# Patient Record
Sex: FEMALE | Race: WHITE | NOT HISPANIC OR LATINO | Employment: OTHER | URBAN - METROPOLITAN AREA
[De-identification: names, ages, dates, MRNs, and addresses within clinical notes are randomized per-mention and may not be internally consistent; named-entity substitution may affect disease eponyms.]

---

## 2017-01-16 DIAGNOSIS — F33.1 MAJOR DEPRESSIVE DISORDER, RECURRENT EPISODE, MODERATE (H): Primary | ICD-10-CM

## 2017-01-17 NOTE — TELEPHONE ENCOUNTER
escitalopram (LEXAPRO) 20 MG tablet     Last Written Prescription Date: 1/12/16  Last Fill Quantity: 135, # refills: 3  Last Office Visit with G primary care provider:  4/19/16        Last PHQ-9 score on record=   PHQ-9 SCORE 6/22/2016   Total Score -   Total Score 4

## 2017-01-18 RX ORDER — ESCITALOPRAM OXALATE 20 MG/1
30 TABLET ORAL DAILY
Qty: 135 TABLET | Refills: 0 | Status: SHIPPED | OUTPATIENT
Start: 2017-01-18 | End: 2017-05-14

## 2017-01-18 NOTE — TELEPHONE ENCOUNTER
Routing refill request to provider for review/approval because:  PHQ-9 is greater than 4  Route to provider to review refill and depression/anxiety plan.  Also due for phq9  Casa Barroso RN

## 2017-03-03 ENCOUNTER — TELEPHONE (OUTPATIENT)
Dept: FAMILY MEDICINE | Facility: OTHER | Age: 67
End: 2017-03-03

## 2017-03-03 DIAGNOSIS — E78.5 HYPERLIPIDEMIA: ICD-10-CM

## 2017-03-03 RX ORDER — SIMVASTATIN 5 MG
TABLET ORAL
Qty: 30 TABLET | Refills: 0 | Status: SHIPPED | OUTPATIENT
Start: 2017-03-03 | End: 2017-04-07

## 2017-03-03 NOTE — LETTER
[facility]  [address]  [place] [address]  [phone]        March 7, 2017    Tiesha Gurrola  [address] [address]              Dear Tiesha Gurrola    This is to remind you that your fasting lab work is due is due.    You may call our office at [phone] to schedule an appointment.    Please disregard this notice if you have already had your labs drawn or made an appointment.        Sincerely,        Jessy David MD/pk

## 2017-03-03 NOTE — TELEPHONE ENCOUNTER
simvastatin (ZOCOR) 5 MG tablet     Last Written Prescription Date: 01/12/16  Last Fill Quantity: 90, # refills: 3  Last Office Visit with G, P or [facility] prescribing provider: 06/22/16       Lab Results   Component Value Date    CHOL 171 01/19/2016     Lab Results   Component Value Date    HDL 50 01/19/2016     Lab Results   Component Value Date    LDL 90 01/19/2016     Lab Results   Component Value Date    TRIG 153 01/19/2016     Lab Results   Component Value Date    CHOLHDLRATIO 5.8 08/20/2015

## 2017-03-03 NOTE — TELEPHONE ENCOUNTER
Medication is being filled for 1 time refill only due to:  Patient needs labs Fasting Lipids.  Please contact patient for lab only appt.   Zully Vargas RN

## 2017-03-10 ENCOUNTER — OFFICE VISIT (OUTPATIENT)
Dept: FAMILY MEDICINE | Facility: CLINIC | Age: 67
End: 2017-03-10
Payer: COMMERCIAL

## 2017-03-10 ENCOUNTER — TELEPHONE (OUTPATIENT)
Dept: FAMILY MEDICINE | Facility: OTHER | Age: 67
End: 2017-03-10

## 2017-03-10 VITALS
SYSTOLIC BLOOD PRESSURE: 128 MMHG | DIASTOLIC BLOOD PRESSURE: 88 MMHG | RESPIRATION RATE: 10 BRPM | HEIGHT: 64 IN | WEIGHT: 213 LBS | BODY MASS INDEX: 36.37 KG/M2 | HEART RATE: 74 BPM | TEMPERATURE: 98.2 F

## 2017-03-10 DIAGNOSIS — Z86.39 HISTORY OF THYROID DISEASE: ICD-10-CM

## 2017-03-10 DIAGNOSIS — L29.9 GENERALIZED PRURITUS: Primary | ICD-10-CM

## 2017-03-10 LAB
ERYTHROCYTE [DISTWIDTH] IN BLOOD BY AUTOMATED COUNT: 12.8 % (ref 10–15)
HCT VFR BLD AUTO: 40.2 % (ref 35–47)
HGB BLD-MCNC: 14 G/DL (ref 11.7–15.7)
MCH RBC QN AUTO: 31.7 PG (ref 26.5–33)
MCHC RBC AUTO-ENTMCNC: 34.8 G/DL (ref 31.5–36.5)
MCV RBC AUTO: 91 FL (ref 78–100)
PLATELET # BLD AUTO: 221 10E9/L (ref 150–450)
RBC # BLD AUTO: 4.41 10E12/L (ref 3.8–5.2)
WBC # BLD AUTO: 7.6 10E9/L (ref 4–11)

## 2017-03-10 PROCEDURE — 85027 COMPLETE CBC AUTOMATED: CPT | Performed by: FAMILY MEDICINE

## 2017-03-10 PROCEDURE — 84443 ASSAY THYROID STIM HORMONE: CPT | Performed by: FAMILY MEDICINE

## 2017-03-10 PROCEDURE — 36415 COLL VENOUS BLD VENIPUNCTURE: CPT | Performed by: FAMILY MEDICINE

## 2017-03-10 PROCEDURE — 80053 COMPREHEN METABOLIC PANEL: CPT | Performed by: FAMILY MEDICINE

## 2017-03-10 PROCEDURE — 99214 OFFICE O/P EST MOD 30 MIN: CPT | Performed by: FAMILY MEDICINE

## 2017-03-10 RX ORDER — PREDNISONE 20 MG/1
TABLET ORAL
Qty: 20 TABLET | Refills: 0 | Status: SHIPPED | OUTPATIENT
Start: 2017-03-10 | End: 2017-03-16

## 2017-03-10 ASSESSMENT — PAIN SCALES - GENERAL: PAINLEVEL: NO PAIN (0)

## 2017-03-10 NOTE — TELEPHONE ENCOUNTER
Reason for call:  Same Day Appointment  Reason for Call:  Same Day Appointment, Requested Provider:  any     PCP: Jessy David    Reason for visit: hands and feet itchy, blisters on palm of hand from itching    Duration of symptoms: off and on for a over a year    Have you been treated for this in the past? No    Additional comments: is wondering if she could be worked in today in [facility] or [place]    Can we leave a detailed message on this number? YES    Phone number patient can be reached at: [phone]    Best Time: any    Call taken on 3/10/2017 at 9:44 AM by Janette Stern

## 2017-03-10 NOTE — PROGRESS NOTES
"  SUBJECTIVE:                                                    Tiesha Gurrola is a 66 year old female who presents to clinic today for the following health issues:      Rash     Onset: Been going on for 2 years, but gets flair ups    Description:   Location: Both hand and both feet  Character: burning, red, Blisters from itching  Itching (Pruritis): YES    Progression of Symptoms:  worsening    Accompanying Signs & Symptoms:  Fever: no   Body aches or joint pain: no   Sore throat symptoms: no   Recent cold symptoms: YES   History:   Previous similar rash: YES    Precipitating factors:   Exposure to similar rash: no   New exposures: None   Recent travel: no     Alleviating factors:  N/A     Therapies Tried and outcome: Has had this for the past 2 years. Usually has flare ups in the summer. Itchy on both hands and feet and now has blisters from itching. Wondering if its not her thyroid.     Benadryl, cortisone, lotion, triamcinolone  No new exposures.   No other family members with this.   She reports that when the itching begins her skin will look normal.   Then as she begins she will notice \"red bumps\" and an occasional blister or two.       HISTORY OF THYROID- she reports that she was on thyroid medication when she was young. She would like this to be rechecked. She has noted thinning of her hair. She wonders if this itching is related to her thyroid.         Problem list and histories reviewed & adjusted, as indicated.  Additional history: as documented        Reviewed and updated as needed this visit by clinical staff  Tobacco  Allergies  Med Hx  Surg Hx  Fam Hx  Soc Hx      Reviewed and updated as needed this visit by Provider         ROS:  C: NEGATIVE for fever, chills, change in weight  INTEGUMENTARY/SKIN: as above.   E/M: NEGATIVE for ear, mouth and throat problems  R: NEGATIVE for significant cough or SOB  CV: NEGATIVE for chest pain, palpitations or peripheral edema  M: NEGATIVE for significant " "arthralgias or myalgia  N: NEGATIVE for weakness, dizziness or paresthesias    OBJECTIVE:                                                    /88  Pulse 74  Temp 98.2  F (36.8  C) (Temporal)  Resp 10  Ht 5' 3.78\" (1.62 m)  Wt 213 lb (96.6 kg)  LMP 05/01/2009  BMI 36.81 kg/m2  Body mass index is 36.81 kg/(m^2).  GENERAL APPEARANCE: patient is alert and oriented. She is constantly scratching her hands and arms and rubbing her feet on the floor and together.   HENT: throat is clear and Mucous membranes are moist.   NECK: no adenopathy, no asymmetry, masses, or scars and thyroid normal to palpation  RESP: lungs clear to auscultation - no rales, rhonchi or wheezes  CV: regular rates and rhythm, normal S1 S2, no S3 or S4 and no murmur, click or rub  SKIN: patient with multiple excoriations noted on both hands and feet.  She has nothing on the trunk or face. She has 3 small blisters in the palm of her left hand. No burrows noted in the web spaces of fingers and toes.          ASSESSMENT/PLAN:                                                        1. Generalized pruritus  Patient with generalized pruritis.   Consider eczema however, she reports normal skin when the itching begins.    Consider scabies but no burrows found. No other family members in the home have this either.   Consider pruritis secondary to systemic disease and will check labs as noted.   Consider neurodermatitis.   No clear exposure to allergen known  Will notify of results.   Trial of prednisone to see if this improves the itching  Will follow up with patient next week with results and will check in on symptoms.   All questions invited, asked and answered to the patient's apparent satisfaction.  Patient agrees to plan.    - predniSONE (DELTASONE) 20 MG tablet; Take 3 tabs (60 mg) by mouth daily x 3 days, 2 tabs (40 mg) daily x 3 days, 1 tab (20 mg) daily x 3 days, then 1/2 tab (10 mg) x 3 days.  Dispense: 20 tablet; Refill: 0  - CBC with platelets  - " Comprehensive metabolic panel (BMP + Alb, Alk Phos, ALT, AST, Total. Bili, TP)    2. History of thyroid disease  Patient with history of thyroid issues   Will check lab and notify of results.   - TSH with free T4 reflex        YULIANA WINSTON MD, MD  [facility]

## 2017-03-10 NOTE — NURSING NOTE
"Chief Complaint   Patient presents with     Blister     itching, blister in hand and feet      Panel Management     honoring choice, hepatitis c, tobacco cessation, lipid        Initial /90  Pulse 74  Temp 98.2  F (36.8  C) (Temporal)  Resp 10  Ht 5' 3.78\" (1.62 m)  Wt 213 lb (96.6 kg)  LMP 05/01/2009  BMI 36.81 kg/m2 Estimated body mass index is 36.81 kg/(m^2) as calculated from the following:    Height as of this encounter: 5' 3.78\" (1.62 m).    Weight as of this encounter: 213 lb (96.6 kg).  Medication Reconciliation: complete     Fanta Valentin CMA  "

## 2017-03-10 NOTE — TELEPHONE ENCOUNTER
"Tiesha Gurrola is a 66 year old female    S-(situation): Tiesha is calling today with intense itching and peeling skin    B-(background): states she's had itching on the chest and arms for a couple years - maybe a spot here or there on ankles    A-(assessment): Pt reports an onset last night of extreme itching on palms and backs of hands and feet and ankles today. It goes penitentiary up her arms.   She has notices some blisters and peeling skin on her hands from itching so much.    Scratching does not relieve the itch. Reports it's almost like a burning sensation.  She states, \"It's driving me crazy.\"     Has had recent cold sxs.    Pain scale (1-10): No pain, just severe itching   Denies: Pain, fever, breathing difficulty, sore throat   Meds/MeasuresTried: Triamcinolone, cortisone    Improves/Worsens: Nothing is helping.       R-(recommendations): See same day  Will comply with recommendation: yes - appt made to see Dr. Lara today    If further questions/concerns or if Sxs do not improve, worsen or new Sxs develop, call your PCP or [facility] as soon as possible.    Guideline used:  Telephone Triage Protocols for Nurses, Fourth Edition, Cris Viera  Rash  Itching    Daysi Lozada, FRANCES, BSN    "

## 2017-03-10 NOTE — TELEPHONE ENCOUNTER
Pt is calling back because she wants to get in today. Pt has some irritation on her hands and ankles. She keeps itching it and they are turning into blisters. Please read message below.

## 2017-03-10 NOTE — MR AVS SNAPSHOT
"              After Visit Summary   3/10/2017    Tiesha Gurrola    MRN: [id number]           Patient Information     Date Of Birth          1950        Visit Information        Provider Department      3/10/2017 3:00 PM Maribel Lara MD [facility]        Today's Diagnoses     Generalized pruritus    -  1    History of thyroid disease           Follow-ups after your visit        Who to contact     If you have questions or need follow up information about today's clinic visit or your schedule please contact [facility] directly at [phone].  Normal or non-critical lab and imaging results will be communicated to you by [employer], letter or phone within 4 business days after the clinic has received the results. If you do not hear from us within 7 days, please contact the clinic through [employer] or phone. If you have a critical or abnormal lab result, we will notify you by phone as soon as possible.  Submit refill requests through [employer] or call your pharmacy and they will forward the refill request to us. Please allow 3 business days for your refill to be completed.          Additional Information About Your Visit        MyChart Information     [employer] lets you send messages to your doctor, view your test results, renew your prescriptions, schedule appointments and more. To sign up, go to [email or web address] . Click on \"Log in\" on the left side of the screen, which will take you to the Welcome page. Then click on \"Sign up Now\" on the right side of the page.     You will be asked to enter the access code listed below, as well as some personal information. Please follow the directions to create your username and password.     Your access code is: [email or web address]  Expires: 2017  3:57 PM     Your access code will  in 90 days. If you need help or a new code, please call your [facility] or [phone].        Care EveryWhere ID     This is your Care EveryWhere ID. This " "could be used by other organizations to access your [place] medical records  [id number]        Your Vitals Were     Pulse Temperature Respirations Height Last Period BMI (Body Mass Index)    74 98.2  F (36.8  C) (Temporal) 10 5' 3.78\" (1.62 m) 05/01/2009 36.81 kg/m2       Blood Pressure from Last 3 Encounters:   03/10/17 130/90   11/30/16 120/72   10/20/16 118/68    Weight from Last 3 Encounters:   03/10/17 213 lb (96.6 kg)   11/30/16 212 lb (96.2 kg)   10/20/16 213 lb (96.6 kg)              We Performed the Following     CBC with platelets     Comprehensive metabolic panel (BMP + Alb, Alk Phos, ALT, AST, Total. Bili, TP)     TSH with free T4 reflex          Today's Medication Changes          These changes are accurate as of: 3/10/17  3:57 PM.  If you have any questions, ask your nurse or doctor.               Start taking these medicines.        Dose/Directions    predniSONE 20 MG tablet   Commonly known as:  DELTASONE   Used for:  Generalized pruritus   Started by:  Maribel Lara MD        Take 3 tabs (60 mg) by mouth daily x 3 days, 2 tabs (40 mg) daily x 3 days, 1 tab (20 mg) daily x 3 days, then 1/2 tab (10 mg) x 3 days.   Quantity:  20 tablet   Refills:  0            Where to get your medicines      These medications were sent to [id number] - [place], [place] - [address] [address]  [address] [address]     Phone:  [phone]     predniSONE 20 MG tablet                Primary Care Provider Office Phone # Fax #    Jessy David [phone]       [facility] [address]  [place] [address]        Thank you!     Thank you for choosing [facility]  for your care. Our goal is always to provide you with excellent care. Hearing back from our patients is one way we can continue to improve our services. Please take a few minutes to complete the written survey that you may receive in the mail after your visit with us. Thank you!   "           Your Updated Medication List - Protect others around you: Learn how to safely use, store and throw away your medicines at www.disposemymeds.org.          This list is accurate as of: 3/10/17  3:57 PM.  Always use your most recent med list.                   Brand Name Dispense Instructions for use    ALPRAZolam 0.25 MG tablet    XANAX    60 tablet    Take 1 tablet (0.25 mg) by mouth nightly as needed for anxiety       ASPIRIN PO      Take 325 mg by mouth as needed for moderate pain Reported on 3/10/2017       escitalopram 20 MG tablet    LEXAPRO    135 tablet    Take 1.5 tablets (30 mg) by mouth daily Due for appointment       flecainide acetate 150 MG Tabs     180 tablet    Take 1 tablet (150 mg) by mouth every 12 hours       predniSONE 20 MG tablet    DELTASONE    20 tablet    Take 3 tabs (60 mg) by mouth daily x 3 days, 2 tabs (40 mg) daily x 3 days, 1 tab (20 mg) daily x 3 days, then 1/2 tab (10 mg) x 3 days.       simvastatin 5 MG tablet    ZOCOR    30 tablet    TAKE ONE TABLET BY MOUTH ONCE DAILY       triamcinolone 0.1 % cream    KENALOG    30 g    Apply sparingly to affected area three times daily for 14 days.

## 2017-03-13 LAB
ALBUMIN SERPL-MCNC: 3.8 G/DL (ref 3.4–5)
ALP SERPL-CCNC: 76 U/L (ref 40–150)
ALT SERPL W P-5'-P-CCNC: 22 U/L (ref 0–50)
ANION GAP SERPL CALCULATED.3IONS-SCNC: 9 MMOL/L (ref 3–14)
AST SERPL W P-5'-P-CCNC: 14 U/L (ref 0–45)
BILIRUB SERPL-MCNC: 0.3 MG/DL (ref 0.2–1.3)
BUN SERPL-MCNC: 13 MG/DL (ref 7–30)
CALCIUM SERPL-MCNC: 8.7 MG/DL (ref 8.5–10.1)
CHLORIDE SERPL-SCNC: 108 MMOL/L (ref 94–109)
CO2 SERPL-SCNC: 25 MMOL/L (ref 20–32)
CREAT SERPL-MCNC: 0.82 MG/DL (ref 0.52–1.04)
GFR SERPL CREATININE-BSD FRML MDRD: 71 ML/MIN/1.7M2
GLUCOSE SERPL-MCNC: 94 MG/DL (ref 70–99)
POTASSIUM SERPL-SCNC: 3.9 MMOL/L (ref 3.4–5.3)
PROT SERPL-MCNC: 7.1 G/DL (ref 6.8–8.8)
SODIUM SERPL-SCNC: 142 MMOL/L (ref 133–144)
TSH SERPL DL<=0.005 MIU/L-ACNC: 2.28 MU/L (ref 0.4–4)

## 2017-03-15 NOTE — PROGRESS NOTES
SUBJECTIVE:                                                    Tiesha Gurrola is a 66 year old female who presents to clinic today for the following health issues:      HPI    Rash     Onset: x1 week    Description:   Location: Entire Body (Hand/Feet Worse)  Character: No Visible Rash/ Just itching  Itching (Pruritis): YES    Progression of Symptoms: Some has improved, but itching other places now    Accompanying Signs & Symptoms:  Fever: no   Body aches or joint pain: no   Sore throat symptoms: no   Recent cold symptoms: no    History:   Previous similar rash: YES    Precipitating factors:   Exposure to similar rash: no   New exposures: None   Recent travel: no     Alleviating factors:       Therapies Tried and outcome: Steroid Cream, Prednisone, Vaseline      Problem list and histories reviewed & adjusted, as indicated.  Additional history: as documented      Patient Active Problem List   Diagnosis     Mitral valve disorder     Tobacco use disorder     A-fib (H)     Anxiety     Major depressive disorder, recurrent episode, moderate (H)     Advanced directives, counseling/discussion     Health Care Home     SVT (supraventricular tachycardia) (H)     Hyperlipidemia     Postmenopausal bleeding     Paroxysmal atrial fibrillation (H)     Past Surgical History   Procedure Laterality Date     C treat ectopic preg,abd preg  1974     about 3 mos.     Colonoscopy  07/11/07     Hc laparoscopy, surgical; appendectomy  08/27/2007     C lap,uterus,unlisted procedure  08/27/2007     Lyis of adhesions of the uterus to the abdominal wall.     Tympanomastoidectomy with facial monitoring  12/13/2011     Procedure:TYMPANOMASTOIDECTOMY WITH FACIAL MONITORING; right tympanoplasty, mastoidectomy with facial nerve monitoring; Surgeon:EVI LLAMAS; Location:[facility] OR     Joint replacement, hip rt/lt Right 8/12/14     Joint Replacement Hip RT/LT     Shoulder surgery Left 1/7/15     torn bicep, arthroplasty, rotator cuff     Dilation  and curettage  2/18/16     HD&C     Dilation and curettage, operative hysteroscopy, combined N/A 2/18/2016     Procedure: COMBINED DILATION AND CURETTAGE, OPERATIVE HYSTEROSCOPY;  Surgeon: Keshia Chang DO;  Location: [facility] OR     Myringotomy Right 4/26/2016     Procedure: MYRINGOTOMY;  Surgeon: Jake Solorzano MD;  Location: [facility] OR     Tympanomastoidectomy Right 6/23/2016     Procedure: TYMPANOMASTOIDECTOMY;  Surgeon: Rishabh Boudreaux MD;  Location: [facility] OR     Meatoplasty ear Right 6/23/2016     Procedure: MEATOPLASTY EAR;  Surgeon: Rishabh Boudreaux MD;  Location: [facility] OR     Canalplasty Right 6/23/2016     Procedure: CANALPLASTY;  Surgeon: Rishabh Boudreaux MD;  Location: [facility] OR     Graft thiersch status post tympanomastoidectomy Right 6/30/2016     Procedure: GRAFT THIERSCH STATUS POST TYMPANOMASTOIDECTOMY;  Surgeon: Rishabh Boudreaux MD;  Location: [facility] OR     H ablation focal afib  10/12/16       Social History   Substance Use Topics     Smoking status: Current Every Day Smoker     Packs/day: 0.25     Years: 32.00     Types: Cigarettes     Smokeless tobacco: Never Used      Comment: 7 cigs a day     Alcohol use No     Family History   Problem Relation Age of Onset     Allergies Son      Hayfever     HEART DISEASE Son      Paroxysmal tach.     Arthritis Mother      Depression Mother      depression and anxiety     Respiratory Mother      Asthma     Arthritis Father      HEART DISEASE Father      Lipids Father      Arrhythmia Father      Pacemaker Father      Heart Surgery Father      bypass x3     CANCER Maternal Grandmother      Breast CA     CANCER Paternal Grandmother      ?? pancreatic CA     OSTEOPOROSIS Paternal Grandmother      Neurologic Disorder Daughter      ?? epilepsy     Obesity Daughter      Family History Negative Brother      Family History Negative Son      Family History Negative Brother      DIABETES Other      Maternal cousin --- [email or web address]     EYE* Other      Niece-- lazy eye      HEART DISEASE Paternal Uncle      death at 56/bypass surgery     HEART DISEASE Paternal Aunt          Current Outpatient Prescriptions   Medication Sig Dispense Refill     triamcinolone (KENALOG) 0.1 % cream Mix entire tube of Kenalog (triamincinolone cream) with 16 oz. Of Cera-Ve or Cetaphil lotion, KEEP REFRIGERATED 80 g 0     simvastatin (ZOCOR) 5 MG tablet TAKE ONE TABLET BY MOUTH ONCE DAILY 30 tablet 0     flecainide acetate 150 MG TABS Take 1 tablet (150 mg) by mouth every 12 hours 180 tablet 3     escitalopram (LEXAPRO) 20 MG tablet Take 1.5 tablets (30 mg) by mouth daily Due for appointment 135 tablet 0     ASPIRIN PO Take 325 mg by mouth as needed for moderate pain Reported on 3/16/2017       Recent Labs   Lab Test  03/10/17   1600  10/12/16   0700  01/19/16   0915  08/20/15   1001   12/22/14   1546   01/15/14   1134   LDL   --    --   90  131*   --    --    --   95   HDL   --    --   50  36*   --    --    --   39*   TRIG   --    --   153*  213*   --    --    --   140   ALT  22   --   22   --    --   20   --   28   CR  0.82  0.69  0.82   --    < >  0.75   < >  0.75   GFRESTIMATED  71  85  71   --    < >  78   < >  79   GFRESTBLACK  85  >90   GFR Calc    86   --    < >  >90   GFR Calc     < >  >90   POTASSIUM  3.9  4.0  4.4   --    < >  3.7   < >  4.3   TSH  2.28   --    --    --    --    --    --   2.59    < > = values in this interval not displayed.      BP Readings from Last 3 Encounters:   03/16/17 120/70   03/10/17 128/88   11/30/16 120/72    Wt Readings from Last 3 Encounters:   03/16/17 214 lb (97.1 kg)   03/10/17 213 lb (96.6 kg)   11/30/16 212 lb (96.2 kg)                  Labs reviewed in EPIC    ROS:  Constitutional, HEENT, cardiovascular, pulmonary, gi and gu systems are negative, except as otherwise noted.    OBJECTIVE:                                                    /70 (BP Location: Right arm, Patient Position: Chair, Cuff Size: Adult Large)  Pulse 64   "Temp 98.8  F (37.1  C) (Oral)  Resp 18  Ht 5' 3.78\" (1.62 m)  Wt 214 lb (97.1 kg)  LMP 05/01/2009  SpO2 100%  BMI 36.99 kg/m2  Body mass index is 36.99 kg/(m^2).  Physical Exam   Constitutional: She appears well-developed and well-nourished.   HENT:   Head: Normocephalic and atraumatic.   Skin:   Excoriations noted on b/l extremities and torso likely from scratching   Psychiatric: She has a normal mood and affect.         Diagnostic Test Results:  none      ASSESSMENT/PLAN:                                                      Problem List Items Addressed This Visit     Tobacco use disorder    Relevant Orders    TOBACCO CESSATION ORDER FOR [facility] (Completed)      Other Visit Diagnoses     Pruritus    -  Primary    Relevant Medications    triamcinolone (KENALOG) 0.1 % cream    Need for hepatitis C screening test        Needs smoking cessation education        Dermatitis        Relevant Medications    triamcinolone (KENALOG) 0.1 % cream       No clear etiology - likely non specific dermaitis  Trial therapy with triamcenolone   Discussed home care  Reportable signs and symptoms discussed  RTC if symptoms persist or fail to improve    Radha Balbuena MD  [facility]  "

## 2017-03-16 ENCOUNTER — OFFICE VISIT (OUTPATIENT)
Dept: FAMILY MEDICINE | Facility: OTHER | Age: 67
End: 2017-03-16
Payer: COMMERCIAL

## 2017-03-16 ENCOUNTER — TELEPHONE (OUTPATIENT)
Dept: FAMILY MEDICINE | Facility: OTHER | Age: 67
End: 2017-03-16

## 2017-03-16 VITALS
HEART RATE: 64 BPM | DIASTOLIC BLOOD PRESSURE: 70 MMHG | SYSTOLIC BLOOD PRESSURE: 120 MMHG | TEMPERATURE: 98.8 F | HEIGHT: 64 IN | RESPIRATION RATE: 18 BRPM | OXYGEN SATURATION: 100 % | BODY MASS INDEX: 36.54 KG/M2 | WEIGHT: 214 LBS

## 2017-03-16 DIAGNOSIS — L30.9 DERMATITIS: ICD-10-CM

## 2017-03-16 DIAGNOSIS — L29.9 PRURITUS: Primary | ICD-10-CM

## 2017-03-16 DIAGNOSIS — Z11.59 NEED FOR HEPATITIS C SCREENING TEST: ICD-10-CM

## 2017-03-16 DIAGNOSIS — F17.200 NEEDS SMOKING CESSATION EDUCATION: ICD-10-CM

## 2017-03-16 DIAGNOSIS — F17.200 TOBACCO USE DISORDER: ICD-10-CM

## 2017-03-16 PROCEDURE — 99213 OFFICE O/P EST LOW 20 MIN: CPT | Performed by: FAMILY MEDICINE

## 2017-03-16 RX ORDER — TRIAMCINOLONE ACETONIDE 1 MG/G
CREAM TOPICAL
Qty: 80 G | Refills: 0 | Status: SHIPPED | OUTPATIENT
Start: 2017-03-16 | End: 2018-08-10

## 2017-03-16 ASSESSMENT — PAIN SCALES - GENERAL: PAINLEVEL: NO PAIN (0)

## 2017-03-16 NOTE — TELEPHONE ENCOUNTER
[facility] needs more information for the patient's prescription for triamcinolone (KENALOG) 0.1 % cream, they want to know if are to compound it and also they need to know how often it needs to be applied?    Thank you Loly

## 2017-03-16 NOTE — NURSING NOTE
"Chief Complaint   Patient presents with     RECHECK     instense itching (meds didn't work)     Panel Management     Quit plan, honoring choices, hep c, lipids, PHQ9, GAGE, MyChart        Initial /70 (BP Location: Right arm, Patient Position: Chair, Cuff Size: Adult Large)  Pulse 64  Temp 98.8  F (37.1  C) (Oral)  Resp 18  Ht 5' 3.78\" (1.62 m)  Wt 214 lb (97.1 kg)  LMP 05/01/2009  SpO2 100%  BMI 36.99 kg/m2 Estimated body mass index is 36.99 kg/(m^2) as calculated from the following:    Height as of this encounter: 5' 3.78\" (1.62 m).    Weight as of this encounter: 214 lb (97.1 kg).  Medication Reconciliation: complete   Maribel Bob, BRUCE      "

## 2017-03-16 NOTE — TELEPHONE ENCOUNTER
If they can do it at  A cost that pt is agreeable to , they can do it, if not patient can do it at home. Needs to be applied twice a day

## 2017-03-16 NOTE — MR AVS SNAPSHOT
"              After Visit Summary   3/16/2017    Tiesha Gurrola    MRN: [id number]           Patient Information     Date Of Birth          1950        Visit Information        Provider Department      3/16/2017 11:00 AM Radha Balbuena MD [facility]        Today's Diagnoses     Pruritus    -  1    Need for hepatitis C screening test        Tobacco use disorder        Needs smoking cessation education        Dermatitis          Care Instructions    CERAVE or Cetaphil        Follow-ups after your visit        Who to contact     If you have questions or need follow up information about today's clinic visit or your schedule please contact [facility] directly at [phone].  Normal or non-critical lab and imaging results will be communicated to you by MyChart, letter or phone within 4 business days after the clinic has received the results. If you do not hear from us within 7 days, please contact the clinic through MyChart or phone. If you have a critical or abnormal lab result, we will notify you by phone as soon as possible.  Submit refill requests through [employer] or call your pharmacy and they will forward the refill request to us. Please allow 3 business days for your refill to be completed.          Additional Information About Your Visit        MyChart Information     [employer] lets you send messages to your doctor, view your test results, renew your prescriptions, schedule appointments and more. To sign up, go to [email or web address] . Click on \"Log in\" on the left side of the screen, which will take you to the Welcome page. Then click on \"Sign up Now\" on the right side of the page.     You will be asked to enter the access code listed below, as well as some personal information. Please follow the directions to create your username and password.     Your access code is: [email or web address]  Expires: 2017  4:57 PM     Your access code will  in 90 days. If you need " "help or a new code, please call your [facility]) or [phone].        Care EveryWhere ID     This is your Care EveryWhere ID. This could be used by other organizations to access your [place] medical records  [id number]        Your Vitals Were     Pulse Temperature Respirations Height Last Period Pulse Oximetry    64 98.8  F (37.1  C) (Oral) 18 5' 3.78\" (1.62 m) 05/01/2009 100%    BMI (Body Mass Index)                   36.99 kg/m2            Blood Pressure from Last 3 Encounters:   03/16/17 120/70   03/10/17 128/88   11/30/16 120/72    Weight from Last 3 Encounters:   03/16/17 214 lb (97.1 kg)   03/10/17 213 lb (96.6 kg)   11/30/16 212 lb (96.2 kg)              We Performed the Following     TOBACCO CESSATION ORDER FOR HM          Today's Medication Changes          These changes are accurate as of: 3/16/17 11:39 AM.  If you have any questions, ask your nurse or doctor.               These medicines have changed or have updated prescriptions.        Dose/Directions    triamcinolone 0.1 % cream   Commonly known as:  KENALOG   This may have changed:  additional instructions   Used for:  Pruritus, Dermatitis   Changed by:  Radha Balbuena MD        Mix entire tube of Kenalog (triamincinolone cream) with 16 oz. Of Cera-Ve or Cetaphil lotion, KEEP REFRIGERATED   Quantity:  80 g   Refills:  0         Stop taking these medicines if you haven't already. Please contact your care team if you have questions.     predniSONE 20 MG tablet   Commonly known as:  DELTASONE   Stopped by:  Radha Balbuena MD                Where to get your medicines      These medications were sent to [id number] - [place], [place] - [address] [address]  [address] [address] [address]     Phone:  [phone]     triamcinolone 0.1 % cream                Primary Care Provider Office Phone # Fax #    Jessy David [phone]       [facility] [address]  [facility] [address]      "   Thank you!     Thank you for choosing [facility]  for your care. Our goal is always to provide you with excellent care. Hearing back from our patients is one way we can continue to improve our services. Please take a few minutes to complete the written survey that you may receive in the mail after your visit with us. Thank you!             Your Updated Medication List - Protect others around you: Learn how to safely use, store and throw away your medicines at www.disposemymeds.org.          This list is accurate as of: 3/16/17 11:39 AM.  Always use your most recent med list.                   Brand Name Dispense Instructions for use    ASPIRIN PO      Take 325 mg by mouth as needed for moderate pain Reported on 3/16/2017       escitalopram 20 MG tablet    LEXAPRO    135 tablet    Take 1.5 tablets (30 mg) by mouth daily Due for appointment       flecainide acetate 150 MG Tabs     180 tablet    Take 1 tablet (150 mg) by mouth every 12 hours       simvastatin 5 MG tablet    ZOCOR    30 tablet    TAKE ONE TABLET BY MOUTH ONCE DAILY       triamcinolone 0.1 % cream    KENALOG    80 g    Mix entire tube of Kenalog (triamincinolone cream) with 16 oz. Of Cera-Ve or Cetaphil lotion, KEEP REFRIGERATED

## 2017-03-17 ASSESSMENT — ANXIETY QUESTIONNAIRES
7. FEELING AFRAID AS IF SOMETHING AWFUL MIGHT HAPPEN: NOT AT ALL
2. NOT BEING ABLE TO STOP OR CONTROL WORRYING: NOT AT ALL
6. BECOMING EASILY ANNOYED OR IRRITABLE: NOT AT ALL
5. BEING SO RESTLESS THAT IT IS HARD TO SIT STILL: MORE THAN HALF THE DAYS
1. FEELING NERVOUS, ANXIOUS, OR ON EDGE: SEVERAL DAYS
3. WORRYING TOO MUCH ABOUT DIFFERENT THINGS: SEVERAL DAYS
GAD7 TOTAL SCORE: 6
IF YOU CHECKED OFF ANY PROBLEMS ON THIS QUESTIONNAIRE, HOW DIFFICULT HAVE THESE PROBLEMS MADE IT FOR YOU TO DO YOUR WORK, TAKE CARE OF THINGS AT HOME, OR GET ALONG WITH OTHER PEOPLE: SOMEWHAT DIFFICULT

## 2017-03-17 ASSESSMENT — PATIENT HEALTH QUESTIONNAIRE - PHQ9: 5. POOR APPETITE OR OVEREATING: MORE THAN HALF THE DAYS

## 2017-03-18 ASSESSMENT — ANXIETY QUESTIONNAIRES: GAD7 TOTAL SCORE: 6

## 2017-03-18 ASSESSMENT — PATIENT HEALTH QUESTIONNAIRE - PHQ9: SUM OF ALL RESPONSES TO PHQ QUESTIONS 1-9: 6

## 2017-03-23 NOTE — PROGRESS NOTES
"  SUBJECTIVE:                                                    Tiesha Gurrola is a 66 year old female who presents to clinic today for the following health issues:    HPI    Rash     Onset: 2-2  week    Description:   Location: Entire Body (Hand/Feet Worse)  Character: No Visible Rash/ Just itching  Itching (Pruritis): YES    Progression of Symptoms: Some has improved, but itching other places now    Accompanying Signs & Symptoms:  Fever: no   Body aches or joint pain: no   Sore throat symptoms: no   Recent cold symptoms: no    History:   Previous similar rash: YES    Precipitating factors:   Exposure to similar rash: no   New exposures: None   Recent travel: no     Alleviating factors:     Therapies Tried and outcome: Steroid Cream, Prednisone, Vaseline    Patient has been seen on 03/10/17 and 03/16/17. Patient states the medication prescribed is not helping. Steroids and steroid cream has not helped. Patient has not worsened, but feels it has stayed the same since last visit. States she has \"itched so much it caused blisters.\"      Problem list and histories reviewed & adjusted, as indicated.  Additional history: as documented      Patient Active Problem List   Diagnosis     Mitral valve disorder     Tobacco use disorder     A-fib (H)     Anxiety     Major depressive disorder, recurrent episode, moderate (H)     Advanced directives, counseling/discussion     Health Care Home     SVT (supraventricular tachycardia) (H)     Hyperlipidemia     Postmenopausal bleeding     Paroxysmal atrial fibrillation (H)     Past Surgical History:   Procedure Laterality Date     C LAP,UTERUS,UNLISTED PROCEDURE  08/27/2007    Lyis of adhesions of the uterus to the abdominal wall.     C TREAT ECTOPIC PREG,ABD PREG  1974    about 3 mos.     CANALPLASTY Right 6/23/2016    Procedure: CANALPLASTY;  Surgeon: Rishabh Boudreaux MD;  Location: [facility] OR     COLONOSCOPY  07/11/07     DILATION AND CURETTAGE  2/18/16    HD&C     DILATION AND " CURETTAGE, OPERATIVE HYSTEROSCOPY, COMBINED N/A 2/18/2016    Procedure: COMBINED DILATION AND CURETTAGE, OPERATIVE HYSTEROSCOPY;  Surgeon: Keshia Chang DO;  Location: [facility] OR     GRAFT THIERSCH STATUS POST TYMPANOMASTOIDECTOMY Right 6/30/2016    Procedure: GRAFT THIERSCH STATUS POST TYMPANOMASTOIDECTOMY;  Surgeon: Rishabh Boudreaux MD;  Location: [facility] OR     H ABLATION FOCAL AFIB  10/12/16     HC LAPAROSCOPY, SURGICAL; APPENDECTOMY  08/27/2007     JOINT REPLACEMENT, HIP RT/LT Right 8/12/14    Joint Replacement Hip RT/LT     MEATOPLASTY EAR Right 6/23/2016    Procedure: MEATOPLASTY EAR;  Surgeon: Rishabh Boudreaux MD;  Location: [facility] OR     MYRINGOTOMY Right 4/26/2016    Procedure: MYRINGOTOMY;  Surgeon: Jake Solorzano MD;  Location: [facility] OR     SHOULDER SURGERY Left 1/7/15    torn bicep, arthroplasty, rotator cuff     TYMPANOMASTOIDECTOMY Right 6/23/2016    Procedure: TYMPANOMASTOIDECTOMY;  Surgeon: Rishabh Boudreaux MD;  [facility] OR     TYMPANOMASTOIDECTOMY WITH FACIAL MONITORING  12/13/2011    Procedure:TYMPANOMASTOIDECTOMY WITH FACIAL MONITORING; right tympanoplasty, mastoidectomy with facial nerve monitoring; Surgeon:JAKE SOLORZANO; Location:[facility] OR       Social History   Substance Use Topics     Smoking status: Current Every Day Smoker     Packs/day: 0.25     Years: 32.00     Types: Cigarettes     Smokeless tobacco: Never Used      Comment: 7 cigs a day     Alcohol use No     Family History   Problem Relation Age of Onset     Allergies Son      Hayfever     HEART DISEASE Son      Paroxysmal tach.     Arthritis Mother      Depression Mother      depression and anxiety     Respiratory Mother      Asthma     Arthritis Father      HEART DISEASE Father      Lipids Father      Arrhythmia Father      Pacemaker Father      Heart Surgery Father      bypass x3     CANCER Maternal Grandmother      Breast CA     CANCER Paternal Grandmother      ?? pancreatic CA     OSTEOPOROSIS Paternal Grandmother       Neurologic Disorder Daughter      ?? epilepsy     Obesity Daughter      Family History Negative Brother      Family History Negative Son      Family History Negative Brother      DIABETES Other      Maternal cousin --- [email or web address]     EYE* Other      Niece-- lazy eye     HEART DISEASE Paternal Uncle      death at 56/bypass surgery     HEART DISEASE Paternal Aunt          Current Outpatient Prescriptions   Medication Sig Dispense Refill     triamcinolone (KENALOG) 0.1 % cream Mix entire tube of Kenalog (triamincinolone cream) with 16 oz. Of Cera-Ve or Cetaphil lotion, KEEP REFRIGERATED 80 g 0     simvastatin (ZOCOR) 5 MG tablet TAKE ONE TABLET BY MOUTH ONCE DAILY 30 tablet 0     escitalopram (LEXAPRO) 20 MG tablet Take 1.5 tablets (30 mg) by mouth daily Due for appointment 135 tablet 0     ASPIRIN PO Take 325 mg by mouth as needed for moderate pain Reported on 3/16/2017       flecainide acetate 150 MG TABS Take 1 tablet (150 mg) by mouth every 12 hours 180 tablet 3     Allergies   Allergen Reactions     No Known Drug Allergies      Oxycodone Nausea and Vomiting     BP Readings from Last 3 Encounters:   03/27/17 126/84   03/16/17 120/70   03/10/17 128/88    Wt Readings from Last 3 Encounters:   03/27/17 215 lb (97.5 kg)   03/16/17 214 lb (97.1 kg)   03/10/17 213 lb (96.6 kg)                  Labs reviewed in EPIC    ROS:  Constitutional, HEENT, cardiovascular, pulmonary, gi and gu systems are negative, except as otherwise noted.    OBJECTIVE:                                                    /84 (BP Location: Right arm, Patient Position: Chair, Cuff Size: Adult Regular)  Pulse 71  Temp 97.5  F (36.4  C) (Temporal)  Resp 16  Wt 215 lb (97.5 kg)  LMP 05/01/2009  SpO2 96%  BMI 37.16 kg/m2  Body mass index is 37.16 kg/(m^2).  Physical Exam   Constitutional: She appears well-developed and well-nourished.   Cardiovascular: Normal rate and regular rhythm.    Pulmonary/Chest: Effort normal and breath sounds  normal.   Skin:   Excoriations of the skin - no rash noted   Psychiatric: She has a normal mood and affect.         Diagnostic Test Results:  none      ASSESSMENT/PLAN:                                                      Problem List Items Addressed This Visit     None      Visit Diagnoses     Generalized pruritus    -  Primary    Relevant Orders    ALLERGY/ASTHMA ADULT REFERRAL    Erythrocyte sedimentation rate auto    CRP, inflammation    Complement C3    Complement C4    Antinuclear antibody screen by EIA    Antistreptolysin O    Uric acid    Need for hepatitis C screening test           Pt started to itch all over the her body with out any rash since earlier this month . The intial labs to r/o systemic causes were all negative including normal thyroid, liver and kidney function and complete blood picture . She has tried oral steroids and then was put on topical steroids with mild improvement but continues to have this intense itch all over her body but mainly in her upper and lower extremities . She has a petechial looking rash on her right shin today but she states that it is so because she has been scratching her skin which makes it look that way. Will get inflammatory markers and autoimmune markers to r/o other etiology . Also will consider allergy referral if results are inconclusive. Pt is agreeable to the above plan  Continue topical  triamcenolone for now.      Radha Balbuena MD  [facility]

## 2017-03-27 ENCOUNTER — OFFICE VISIT (OUTPATIENT)
Dept: FAMILY MEDICINE | Facility: OTHER | Age: 67
End: 2017-03-27
Payer: COMMERCIAL

## 2017-03-27 VITALS
WEIGHT: 215 LBS | SYSTOLIC BLOOD PRESSURE: 126 MMHG | TEMPERATURE: 97.5 F | OXYGEN SATURATION: 96 % | RESPIRATION RATE: 16 BRPM | BODY MASS INDEX: 37.16 KG/M2 | DIASTOLIC BLOOD PRESSURE: 84 MMHG | HEART RATE: 71 BPM

## 2017-03-27 DIAGNOSIS — L29.9 GENERALIZED PRURITUS: Primary | ICD-10-CM

## 2017-03-27 LAB
CRP SERPL-MCNC: 10 MG/L (ref 0–8)
ERYTHROCYTE [SEDIMENTATION RATE] IN BLOOD BY WESTERGREN METHOD: 10 MM/H (ref 0–30)
URATE SERPL-MCNC: 4.8 MG/DL (ref 2.6–6)

## 2017-03-27 PROCEDURE — 86160 COMPLEMENT ANTIGEN: CPT | Performed by: FAMILY MEDICINE

## 2017-03-27 PROCEDURE — 86140 C-REACTIVE PROTEIN: CPT | Performed by: FAMILY MEDICINE

## 2017-03-27 PROCEDURE — 86038 ANTINUCLEAR ANTIBODIES: CPT | Performed by: FAMILY MEDICINE

## 2017-03-27 PROCEDURE — 85652 RBC SED RATE AUTOMATED: CPT | Performed by: FAMILY MEDICINE

## 2017-03-27 PROCEDURE — 84550 ASSAY OF BLOOD/URIC ACID: CPT | Performed by: FAMILY MEDICINE

## 2017-03-27 PROCEDURE — 86060 ANTISTREPTOLYSIN O TITER: CPT | Performed by: FAMILY MEDICINE

## 2017-03-27 PROCEDURE — 36415 COLL VENOUS BLD VENIPUNCTURE: CPT | Performed by: FAMILY MEDICINE

## 2017-03-27 PROCEDURE — 99214 OFFICE O/P EST MOD 30 MIN: CPT | Performed by: FAMILY MEDICINE

## 2017-03-27 ASSESSMENT — PAIN SCALES - GENERAL: PAINLEVEL: NO PAIN (0)

## 2017-03-27 NOTE — MR AVS SNAPSHOT
After Visit Summary   3/27/2017    Tiesha Gurrola    MRN: [id number]           Patient Information     Date Of Birth          1950        Visit Information        Provider Department      3/27/2017 12:40 PM Radha Balbuena MD [facility]        Today's Diagnoses     Generalized pruritus    -  1       Follow-ups after your visit        Additional Services     ALLERGY/ASTHMA ADULT REFERRAL       Your provider has referred you to: [facility]: [facility] [phone] [email or web address]    Please be aware that coverage of these services is subject to the terms and limitations of your health insurance plan.  Call member services at your health plan with any benefit or coverage questions.      Please bring the following with you to your appointment:    (1) Any X-Rays, CTs or MRIs which have been performed.  Contact the facility where they were done to arrange for  prior to your scheduled appointment.    (2) List of current medications  (3) This referral request   (4) Any documents/labs given to you for this referral                  Your next 10 appointments already scheduled     Mar 27, 2017  1:45 PM CDT   LAB with [facility]   [facility] ([facility]    [address] [facility] [id number] [phone]           Patient must bring picture ID.  Patient should be prepared to give a urine specimen  Please do not eat 10-12 hours before your appointment if you are coming in fasting for labs on lipids, cholesterol, or glucose (sugar).  Pregnant women should follow their Care Team instructions. Water with medications is okay. Do not drink coffee or other fluids.   If you have concerns about taking  your medications, please ask at office or if scheduling via [employer], send a message by clicking on Secure Messaging, Message Your Care Team.              Who to contact     If you have questions or need  "follow up information about today's clinic visit or your schedule please contact [facility] [place] directly at [phone].  Normal or non-critical lab and imaging results will be communicated to you by MyChart, letter or phone within 4 business days after the clinic has received the results. If you do not hear from us within 7 days, please contact the clinic through [employer] or phone. If you have a critical or abnormal lab result, we will notify you by phone as soon as possible.  Submit refill requests through [employer] or call your pharmacy and they will forward the refill request to us. Please allow 3 business days for your refill to be completed.          Additional Information About Your Visit        [employer] Information     [employer] lets you send messages to your doctor, view your test results, renew your prescriptions, schedule appointments and more. To sign up, go to [email or web address] . Click on \"Log in\" on the left side of the screen, which will take you to the Welcome page. Then click on \"Sign up Now\" on the right side of the page.     You will be asked to enter the access code listed below, as well as some personal information. Please follow the directions to create your username and password.     Your access code is: [email or web address]  Expires: 2017  4:57 PM     Your access code will  in 90 days. If you need help or a new code, please call your [place] clinic or [phone].        Care EveryWhere ID     This is your Care EveryWhere ID. This could be used by other organizations to access your [place] medical records  [id number]        Your Vitals Were     Pulse Temperature Respirations Last Period Pulse Oximetry BMI (Body Mass Index)    71 97.5  F (36.4  C) (Temporal) 16 2009 96% 37.16 kg/m2       Blood Pressure from Last 3 Encounters:   17 126/84   17 120/70   03/10/17 128/88    Weight from Last 3 Encounters:   17 215 lb (97.5 kg)   17 214 lb (97.1 kg) "   03/10/17 213 lb (96.6 kg)              We Performed the Following     ALLERGY/ASTHMA ADULT REFERRAL     Antinuclear antibody screen by EIA     Antistreptolysin O     Complement C3     Complement C4     CRP, inflammation     Erythrocyte sedimentation rate auto     Uric acid        Primary Care Provider Office Phone # Fax #    Jessy F MD Joann [phone]       [facility] [address]  [facility] [address]        Thank you!     Thank you for choosing [facility]  for your care. Our goal is always to provide you with excellent care. Hearing back from our patients is one way we can continue to improve our services. Please take a few minutes to complete the written survey that you may receive in the mail after your visit with us. Thank you!             Your Updated Medication List - Protect others around you: Learn how to safely use, store and throw away your medicines at www.disposemymeds.org.          This list is accurate as of: 3/27/17  1:07 PM.  Always use your most recent med list.                   Brand Name Dispense Instructions for use    ASPIRIN PO      Take 325 mg by mouth as needed for moderate pain Reported on 3/16/2017       escitalopram 20 MG tablet    LEXAPRO    135 tablet    Take 1.5 tablets (30 mg) by mouth daily Due for appointment       flecainide acetate 150 MG Tabs     180 tablet    Take 1 tablet (150 mg) by mouth every 12 hours       simvastatin 5 MG tablet    ZOCOR    30 tablet    TAKE ONE TABLET BY MOUTH ONCE DAILY       triamcinolone 0.1 % cream    KENALOG    80 g    Mix entire tube of Kenalog (triamincinolone cream) with 16 oz. Of Cera-Ve or Cetaphil lotion, KEEP REFRIGERATED

## 2017-03-27 NOTE — NURSING NOTE
"Chief Complaint   Patient presents with     Derm Problem     medication not helping     Panel Management     honoring choices, hep c, phq9/magali       Initial /84 (BP Location: Right arm, Patient Position: Chair, Cuff Size: Adult Regular)  Pulse 71  Temp 97.5  F (36.4  C) (Temporal)  Resp 16  Wt 215 lb (97.5 kg)  LMP 05/01/2009  SpO2 96%  BMI 37.16 kg/m2 Estimated body mass index is 37.16 kg/(m^2) as calculated from the following:    Height as of 3/16/17: 5' 3.78\" (1.62 m).    Weight as of this encounter: 215 lb (97.5 kg).  Medication Reconciliation: complete     Sheryl Chacon, BRUCE      "

## 2017-03-29 LAB
ANA SER QL IA: NORMAL
ASO AB SERPL-ACNC: 71 IU/ML (ref 0–120)
C3 SERPL-MCNC: 137 MG/DL (ref 76–169)
C4 SERPL-MCNC: 39 MG/DL (ref 15–50)

## 2017-03-30 ENCOUNTER — TELEPHONE (OUTPATIENT)
Dept: FAMILY MEDICINE | Facility: OTHER | Age: 67
End: 2017-03-30

## 2017-03-30 NOTE — TELEPHONE ENCOUNTER
----- Message from NAVI Venegas CNP sent at 3/29/2017  6:22 PM CDT -----  Please call patient and let her know that one of the inflammatory markers was very mildly elevated and I do not think we can pinpoint the cause of her symptoms from this.  Dr. Balbuena recommended follow up with allergist if blood tests inconclusive and I think this is the best next step.  Referral is already in.  SHADI Best

## 2017-04-05 ENCOUNTER — OFFICE VISIT (OUTPATIENT)
Dept: ALLERGY | Facility: OTHER | Age: 67
End: 2017-04-05
Payer: COMMERCIAL

## 2017-04-05 VITALS
DIASTOLIC BLOOD PRESSURE: 74 MMHG | SYSTOLIC BLOOD PRESSURE: 128 MMHG | WEIGHT: 217.5 LBS | BODY MASS INDEX: 37.59 KG/M2 | OXYGEN SATURATION: 97 % | HEART RATE: 64 BPM

## 2017-04-05 DIAGNOSIS — R06.02 SOB (SHORTNESS OF BREATH): Primary | ICD-10-CM

## 2017-04-05 DIAGNOSIS — L29.9 PRURITIC DISORDER: ICD-10-CM

## 2017-04-05 DIAGNOSIS — L50.3 DERMATOGRAPHISM: ICD-10-CM

## 2017-04-05 LAB
FEF 25/75: NORMAL
FEV-1: NORMAL
FEV1/FVC: NORMAL
FVC: NORMAL

## 2017-04-05 PROCEDURE — 99204 OFFICE O/P NEW MOD 45 MIN: CPT | Mod: 25 | Performed by: ALLERGY & IMMUNOLOGY

## 2017-04-05 PROCEDURE — 94010 BREATHING CAPACITY TEST: CPT | Performed by: ALLERGY & IMMUNOLOGY

## 2017-04-05 RX ORDER — CETIRIZINE HYDROCHLORIDE 10 MG/1
10 TABLET ORAL 2 TIMES DAILY
Qty: 60 TABLET | Refills: 11 | Status: SHIPPED | OUTPATIENT
Start: 2017-04-05 | End: 2020-09-04

## 2017-04-05 RX ORDER — ALBUTEROL SULFATE 90 UG/1
2 AEROSOL, METERED RESPIRATORY (INHALATION) EVERY 4 HOURS PRN
Qty: 1 INHALER | Refills: 1 | Status: SHIPPED | OUTPATIENT
Start: 2017-04-05 | End: 2022-03-11

## 2017-04-05 NOTE — MR AVS SNAPSHOT
After Visit Summary   4/5/2017    Tiseha Gurrola    MRN: [id number]           Patient Information     Date Of Birth          1950        Visit Information        Provider Department      4/5/2017 11:00 AM Rell Huber DO [facility]        Today's Diagnoses     SOB (shortness of breath)    -  1      Care Instructions    Allergy Staff Appt Hours Shot Hours Locations    Physician     Rell Huber DO       Support Staff     FRANCES Nelson MA  Monday:                      [place] 8-7     Tuesday:         [place] 8-5 Wednesday:        [place]: 7-5 Friday:        [place] 7-5 [place] Monday: 9-6 Friday: 7-2     [place]        Tuesday: 7-12 Thursday: 1-6 [place] Tuesday: 1-6 Wednesday: 11-6 Thursday: 7-12 [facility]  [address] [place] [address]  Appt Line: [phone]  Allergy RN (Monday):  [phone]    [facility]  [address] [place] [address]  Appt Line: [phone]  Allergy RN (Tues & Wed):  [phone]    [facility]  [address] [place] [address]  Appt Line: [phone]  Allergy RN (Friday):  [phone]       Important Scheduling Information  Aspirin Desensitization: Appt will last 2 clinic days. Please call the Allergy RN line for your clinic to schedule. Discontinue antihistamines 7 days prior to the appointment.     Food Challenges: Appt will last 3-4 hours. Please call the Allergy RN line for your clinic to schedule. Discontinue antihistamines 7 days prior to the appointment.     Penicillin Testing: Appt will last 2-3 hours. Please call the Allergy RN line for your clinic to schedule. Discontinue antihistamines 7 days prior to the appointment.     Skin Testing: Appt will about 40 minutes. Call the appointment line for your clinic to schedule. Discontinue antihistamines 7 days prior to the appointment.     Venom Testing: Appt will  "last 2-3 hours. Please call the Allergy RN line for your clinic to schedule. Discontinue antihistamines 7 days prior to the appointment.     Thank you for trusting us with your Allergy, Asthma, and Immunology care. Please feel free to contact us with any questions or concerns you may have.      - Start Zyrtec 10mg by mouth twice daily. If itching persist call and let our office know. Take everyday for the next 3 months.   - Albuterol 2-4 puffs inhaled (use a spacer unless using a Proair Respiclick device) every 4 hours as needed for chest tightness, wheezing, shortness of breath and/or coughing.   - Return to clinic in 3 months.           Follow-ups after your visit        Follow-up notes from your care team     Return in about 3 months (around 7/5/2017).      Who to contact     If you have questions or need follow up information about today's clinic visit or your schedule please contact [facility] directly at [phone].  Normal or non-critical lab and imaging results will be communicated to you by Globitelhart, letter or phone within 4 business days after the clinic has received the results. If you do not hear from us within 7 days, please contact the clinic through [employer] or phone. If you have a critical or abnormal lab result, we will notify you by phone as soon as possible.  Submit refill requests through [employer] or call your pharmacy and they will forward the refill request to us. Please allow 3 business days for your refill to be completed.          Additional Information About Your Visit        [employer] Information     [employer] lets you send messages to your doctor, view your test results, renew your prescriptions, schedule appointments and more. To sign up, go to [email or web address] . Click on \"Log in\" on the left side of the screen, which will take you to the Welcome page. Then click on \"Sign up Now\" on the right side of the page.     You will be asked to enter the access code listed " below, as well as some personal information. Please follow the directions to create your username and password.     Your access code is: [email or web address]  Expires: 2017  4:57 PM     Your access code will  in 90 days. If you need help or a new code, please call your [facility] or [phone].        Care EveryWhere ID     This is your Care EveryWhere ID. This could be used by other organizations to access your [facility] medical records  [id number]        Your Vitals Were     Pulse Last Period Pulse Oximetry BMI (Body Mass Index)          64 2009 97% 37.59 kg/m2         Blood Pressure from Last 3 Encounters:   17 128/74   17 126/84   17 120/70    Weight from Last 3 Encounters:   17 217 lb 8 oz (98.7 kg)   17 215 lb (97.5 kg)   17 214 lb (97.1 kg)              We Performed the Following     Spirometry, Breathing Capacity        Primary Care Provider Office Phone # Fax #    Jessy David [phone]       [facility] [address]  [facility] [address]        Thank you!     Thank you for choosing [facility]  for your care. Our goal is always to provide you with excellent care. Hearing back from our patients is one way we can continue to improve our services. Please take a few minutes to complete the written survey that you may receive in the mail after your visit with us. Thank you!             Your Updated Medication List - Protect others around you: Learn how to safely use, store and throw away your medicines at www.disposemymeds.org.          This list is accurate as of: 17 11:55 AM.  Always use your most recent med list.                   Brand Name Dispense Instructions for use    ASPIRIN PO      Take 325 mg by mouth as needed for moderate pain Reported on 2017       BENADRYL PO          escitalopram 20 MG tablet    LEXAPRO    135 tablet    Take 1.5 tablets (30 mg) by mouth daily Due for appointment        flecainide acetate 150 MG Tabs     180 tablet    Take 1 tablet (150 mg) by mouth every 12 hours       simvastatin 5 MG tablet    ZOCOR    30 tablet    TAKE ONE TABLET BY MOUTH ONCE DAILY       triamcinolone 0.1 % cream    KENALOG    80 g    Mix entire tube of Kenalog (triamincinolone cream) with 16 oz. Of Cera-Ve or Cetaphil lotion, KEEP REFRIGERATED

## 2017-04-05 NOTE — PROGRESS NOTES
Tiesha Gurrola is a 66 year old White female with previous medical history significant for atrial fibrillation, hyperlipidemia and diffuse pruritus. Tiesha Gurrola is being seen today for evaluation of diffuse pruritus and shortness of breath. The patient is being seen in consultation at the request of Dr. Radha Balbuena MD.     The patient has a history of diffuse itching over the last 2-3 years. Symptoms have been predominately present in the summer. No symptoms at any other time of the year. Historically not associated with skin rash. However, over the last 3-4 weeks the patient has had return of itching that involves itching of hands, feet, ankles, face and chest. No rash associated aside from after she has been itching at rash she will develop erythematous welts. Welts do not occur prior skin itching. No angioedema associated. She has tried prednisone and this has been somewhat helpful. Treatment with Kenalog was not helpful. Diphenhydramine has been beneficial. Lab workup has included CBC, CMP, TSH, C3, C4, MOE, Uric acid and ESR. CRP was elevated. She is not having systemic symptoms. She has a history of blood transfusion multiple years ago. She has not had any new medication starts. No fevers or chills.     Patient reports that intermittently she will have shortness of breath and wheezing on 3-4 occasions per year. Symptoms occur at rest. Given albuterol, but never used. Symptoms last 5-10 minutes and resolve on own. No chest pain or chest tightness. Undergoing cardiac evaluation. No history of asthma. No ER visits. No hospitalization. No prednisone use.     The patient has no history of eczema, food allergies, medications allergies.     ENVIRONMENTAL HISTORY: [place] is heated with a forced air. They does have central air conditioning. The patient's bedroom is furnished with carpeting in bedroom and fabric window coverings.  Pets inside the house  include 1 dog(s). There is not history of cockroach or mice infestation. There is/are 0 smokers in the house.  The house does not have a damp basement.       Past Medical History:   Diagnosis Date     Adhesive capsulitis      Adhesive capsulitis of shoulder 7/30/2013     History of blood transfusion      Hyperlipemia      Mitral valve disorder      Mitral valve disorders     Hx of mitral valve prolapse     OA (osteoarthritis) of hip      Osteoarthritis of acromioclavicular joint 10/17/2012     Paroxysmal atrial fibrillation (H) 7/2008     Paroxysmal supraventricular tachycardia (H)      Rotator cuff tear 10/17/2012     Tobacco abuse      Family History   Problem Relation Age of Onset     Allergies Son      Hayfever     HEART DISEASE Son      Paroxysmal tach.     Arthritis Mother      Depression Mother      depression and anxiety     Respiratory Mother      Asthma     Arthritis Father      HEART DISEASE Father      Lipids Father      Arrhythmia Father      Pacemaker Father      Heart Surgery Father      bypass x3     CANCER Maternal Grandmother      Breast CA     CANCER Paternal Grandmother      ?? pancreatic CA     OSTEOPOROSIS Paternal Grandmother      Neurologic Disorder Daughter      ?? epilepsy     Obesity Daughter      Family History Negative Brother      Family History Negative Son      Family History Negative Brother      DIABETES Other      Maternal cousin --- [email or web address]     EYE* Other      Niece-- lazy eye     HEART DISEASE Paternal Uncle      death at 56/bypass surgery     HEART DISEASE Paternal Aunt      Past Surgical History:   Procedure Laterality Date     C LAP,UTERUS,UNLISTED PROCEDURE  08/27/2007    Lyis of adhesions of the uterus to the abdominal wall.     C TREAT ECTOPIC PREG,ABD PREG  1974    about 3 mos.     CANALPLASTY Right 6/23/2016    Procedure: CANALPLASTY;  Surgeon: Rishabh Boudreaux MD;  Location: [facility] OR     COLONOSCOPY  07/11/07     DILATION AND CURETTAGE  2/18/16    HD&C     DILATION  AND CURETTAGE, OPERATIVE HYSTEROSCOPY, COMBINED N/A 2/18/2016    Procedure: COMBINED DILATION AND CURETTAGE, OPERATIVE HYSTEROSCOPY;  Surgeon: Keshia Chang DO;  Location: [facility] OR     GRAFT THIERSCH STATUS POST TYMPANOMASTOIDECTOMY Right 6/30/2016    Procedure: GRAFT THIERSCH STATUS POST TYMPANOMASTOIDECTOMY;  Surgeon: Rishabh Boudreaux MD;  Location: [facility] OR     H ABLATION FOCAL AFIB  10/12/16     HC LAPAROSCOPY, SURGICAL; APPENDECTOMY  08/27/2007     JOINT REPLACEMENT, HIP RT/LT Right 8/12/14    Joint Replacement Hip RT/LT     MEATOPLASTY EAR Right 6/23/2016    Procedure: MEATOPLASTY EAR;  Surgeon: Rishabh Boudreaux MD;  Location: [facility] OR     MYRINGOTOMY Right 4/26/2016    Procedure: MYRINGOTOMY;  Surgeon: Jake Solorzano MD;  Location: [facility] OR     SHOULDER SURGERY Left 1/7/15    torn bicep, arthroplasty, rotator cuff     TYMPANOMASTOIDECTOMY Right 6/23/2016    Procedure: TYMPANOMASTOIDECTOMY;  Surgeon: Rishabh Boudreaux MD;  [facility]     TYMPANOMASTOIDECTOMY WITH FACIAL MONITORING  12/13/2011    Procedure:TYMPANOMASTOIDECTOMY WITH FACIAL MONITORING; right tympanoplasty, mastoidectomy with facial nerve monitoring; Surgeon:JAKE SOLORZANO; Location:[facility] OR       REVIEW OF SYSTEMS:  General: negative for weight gain. negative for weight loss. positive  for changes in sleep.   Ears: positive  for fullness. positive  for hearing loss. positive  for dizziness.   Nose: positive  for snoring.negative for changes in smell. negative for drainage.   Eyes: positive  for eye watering. positive  for eye itching. positive  for vision changes. positive  for eye redness.  Throat: positive  for hoarseness. negative for sore throat. negative for trouble swallowing.   Lungs: negative for shortness of breath.positive  for wheezing. negative for sputum production.   Cardiovascular: negative for chest pain. positive  for swelling of ankles. positive  for fast or irregular heartbeat.   Gastrointestinal: negative for  nausea. positive  for heartburn. negative for acid reflux.   Musculoskeletal: positive  for joint pain. negative for joint stiffness. negative for joint swelling.   Neurologic: negative for seizures. positive  for fainting. negative for weakness.   Psychiatric: negative for changes in mood. positive  for anxiety.   Endocrine: negative for cold intolerance. positive  for heat intolerance. negative for tremors.   Lymphatic: negative for lower extremity swelling. negative for lymph node swelling.   Hematologic: positive  for easy bruising. negative for easy bleeding.  Integumentary: negative for rash. negative for scaling. negative for nail changes.       Current Outpatient Prescriptions:      DiphenhydrAMINE HCl (BENADRYL PO), , Disp: , Rfl:      cetirizine (ZYRTEC) 10 MG tablet, Take 1 tablet (10 mg) by mouth 2 times daily, Disp: 60 tablet, Rfl: 11     albuterol (VENTOLIN HFA) 108 (90 BASE) MCG/ACT Inhaler, Inhale 2 puffs into the lungs every 4 hours as needed for shortness of breath / dyspnea or wheezing, Disp: 1 Inhaler, Rfl: 1     triamcinolone (KENALOG) 0.1 % cream, Mix entire tube of Kenalog (triamincinolone cream) with 16 oz. Of Cera-Ve or Cetaphil lotion, KEEP REFRIGERATED, Disp: 80 g, Rfl: 0     simvastatin (ZOCOR) 5 MG tablet, TAKE ONE TABLET BY MOUTH ONCE DAILY, Disp: 30 tablet, Rfl: 0     escitalopram (LEXAPRO) 20 MG tablet, Take 1.5 tablets (30 mg) by mouth daily Due for appointment, Disp: 135 tablet, Rfl: 0     flecainide acetate 150 MG TABS, Take 1 tablet (150 mg) by mouth every 12 hours, Disp: 180 tablet, Rfl: 3     ASPIRIN PO, Take 325 mg by mouth as needed for moderate pain Reported on 4/5/2017, Disp: , Rfl:   Immunization History   Administered Date(s) Administered     Influenza (H1N1) 12/01/2009     Influenza (IIV3) 11/06/2004, 11/03/2006, 10/18/2007, 11/25/2008, 12/01/2009, 10/30/2012     Pneumococcal 23 valent 11/03/2006     TD (ADULT, 7+) 07/09/1997, 09/14/2005     TDAP Vaccine (Adacel)  06/22/2015     Allergies   Allergen Reactions     No Known Drug Allergies      Oxycodone Nausea and Vomiting         EXAM:   Constitutional:  Appears well-developed and well-nourished. No distress.   HEENT:   Head: Normocephalic.   Right Ear: External ear normal. TM normal  Left Ear: External ear normal. TM normal  Mouth/Throat: No oropharyngeal exudate present.   No cobblestoning of posterior oropharynx.   Nasal tissue pink and normal appearing.  No rhinorrhea noted.    Eyes: Conjunctivae are non-erythematous   No maxillary or frontal sinus tenderness to palpation.   Cardiovascular: Normal rate, regular rhythm and normal heart sounds. Exam reveals no gallop and no friction rub.   No murmur heard.  Respiratory: Effort normal and breath sounds normal. No respiratory distress. No wheezes. No rales.   Musculoskeletal: Normal range of motion.   Lymphadenopathy:   No cervical adenopathy.   No lower extremity edema.   Neuro: Oriented to person, place, and time.  Skin: excoriations demonstrated on arms from previous scratching. Urticarial lesion demonstrated on chin after she was itching at skin.   Psychiatric: Normal mood and affect.     Nursing note and vitals reviewed.    ASSESSMENT/PLAN:  Problem List Items Addressed This Visit        Respiratory    SOB (shortness of breath) - Primary     History of shortness of breath intermittently over the last few years. Occur 3-4 times yearly. Associated with wheezing. Given an albuterol inhaler, but has not used. Symptoms last 10-15 minutes. Undergoing cardiac workup.     Spirometry done, reviewed and normal. However, poor technique.     - Trial of albuterol.   - Albuterol 2-4 puffs inhaled (use a spacer unless using a Proair Respiclick device) every 4 hours as needed for chest tightness, wheezing, shortness of breath and/or coughing.            Relevant Medications    DiphenhydrAMINE HCl (BENADRYL PO)    cetirizine (ZYRTEC) 10 MG tablet    albuterol (VENTOLIN HFA) 108 (90 BASE)  MCG/ACT Inhaler    Other Relevant Orders    Spirometry, Breathing Capacity (Completed)       Musculoskeletal and Integumentary    Pruritic disorder     History of diffuse itching over the last 3-4 weeks. Can involve palms, feet, ankles, heads and chest. Historically, problematic in the summer for the last 2-3 years. Prednisone has been helpful. Kenalog not helpful. Emollients not helpful. Diphenhydramine helpful. Now if itches at skin she will develop welts. No systemic symptoms.     Blood work evaluation including CBC, CMP, TSH, C3, C4, MOE, Uric acid and ESR. CRP elevated. History of blood transfusion.     - Trial of cetirizine 10mg PO bid. Use for next 2 months.   - If no improvement will increase to 20mg PO bid.   - If no improvement would send hepatitis serology and HIV.   - Associated dermatographism. 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   - Simvastatin could be associated, but not likely given pruritus not year round.   - Return to clinic in 2 months.          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     - Cetirizine 10mg PO bid. If symptoms continue then increase to 20mg PO bid.          Relevant Medications    DiphenhydrAMINE HCl (BENADRYL PO)    cetirizine (ZYRTEC) 10 MG tablet    albuterol (VENTOLIN HFA) 108 (90 BASE) MCG/ACT Inhaler          Chart documentation with Dragon Voice recognition Software. Although reviewed after completion, some words and grammatical errors may remain.    Rell Huber,    Allergy/Immunology  [facility], [place] [facility] [place]

## 2017-04-05 NOTE — ASSESSMENT & PLAN NOTE
History of shortness of breath intermittently over the last few years. Occur 3-4 times yearly. Associated with wheezing. Given an albuterol inhaler, but has not used. Symptoms last 10-15 minutes. Undergoing cardiac workup.     Spirometry done, reviewed and normal. However, poor technique.     - Trial of albuterol.   - Albuterol 2-4 puffs inhaled (use a spacer unless using a Proair Respiclick device) every 4 hours as needed for chest tightness, wheezing, shortness of breath and/or coughing.

## 2017-04-05 NOTE — PATIENT INSTRUCTIONS
Allergy Staff Appt Hours Shot Hours Locations    Physician     Rell Huber DO       Support Staff     Pam CH RN      Aletha MENDIETA MA  Monday:                      [place] 8-7     Tuesday:         [place] 8-5 Wednesday:        [place]: 7-5     Friday:        [place] 7-5   [place]        Monday: 9-6        Friday: 7-2     [place]        Tuesday: 7-12 Thursday: 1-6     [place] Tuesday: 1-6 Wednesday: 11-6 Thursday: 7-12 [facility]  [address] [place] [address]  Appt Line: [phone]  Allergy RN (Monday):  [phone]    [facility]  [address] [place] [address]  Appt Line: [phone]  Allergy RN (Tues & Wed):  [phone]    [facility]  [address] [place] [address]  Appt Line: [phone]  Allergy RN (Friday):  [phone]       Important Scheduling Information  Aspirin Desensitization: Appt will last 2 clinic days. Please call the Allergy RN line for your clinic to schedule. Discontinue antihistamines 7 days prior to the appointment.     Food Challenges: Appt will last 3-4 hours. Please call the Allergy RN line for your clinic to schedule. Discontinue antihistamines 7 days prior to the appointment.     Penicillin Testing: Appt will last 2-3 hours. Please call the Allergy RN line for your clinic to schedule. Discontinue antihistamines 7 days prior to the appointment.     Skin Testing: Appt will about 40 minutes. Call the appointment line for your clinic to schedule. Discontinue antihistamines 7 days prior to the appointment.     Venom Testing: Appt will last 2-3 hours. Please call the Allergy RN line for your clinic to schedule. Discontinue antihistamines 7 days prior to the appointment.     Thank you for trusting us with your Allergy, Asthma, and Immunology care. Please feel free to contact us with any questions or concerns you may have.      - Start Zyrtec 10mg by mouth twice daily. If itching persist call and  let our office know. Take everyday for the next 3 months.   - Albuterol 2-4 puffs inhaled (use a spacer unless using a Proair Respiclick device) every 4 hours as needed for chest tightness, wheezing, shortness of breath and/or coughing.   - Return to clinic in 3 months.

## 2017-04-05 NOTE — Clinical Note
I saw Tiesha today as a new patient. Starting on cetirizine 10mg by mouth twice daily. If no improvement will increased to 20mg PO bid. If still no improvement will send further blood work to ascertain other etiologies. However, I suspect mast cell mediated. History of dermatographism. Given albuterol inhaler to ascertain if helpful for shortness of breath. Please see note for full details.   Rell Huber

## 2017-04-05 NOTE — NURSING NOTE
"Chief Complaint   Patient presents with     Consult     Itching       Initial /74 (BP Location: Right arm, Patient Position: Chair, Cuff Size: Adult Large)  Pulse 64  Wt 217 lb 8 oz (98.7 kg)  LMP 05/01/2009  SpO2 97%  BMI 37.59 kg/m2 Estimated body mass index is 37.59 kg/(m^2) as calculated from the following:    Height as of 3/16/17: 5' 3.78\" (1.62 m).    Weight as of this encounter: 217 lb 8 oz (98.7 kg).  Medication Reconciliation: complete   Aletha Power MA      "

## 2017-04-05 NOTE — ASSESSMENT & PLAN NOTE
History of diffuse itching over the last 3-4 weeks. Can involve palms, feet, ankles, heads and chest. Historically, problematic in the summer for the last 2-3 years. Prednisone has been helpful. Kenalog not helpful. Emollients not helpful. Diphenhydramine helpful. Now if itches at skin she will develop welts. No systemic symptoms.     Blood work evaluation including CBC, CMP, TSH, C3, C4, MOE, Uric acid and ESR. CRP elevated. History of blood transfusion.     - Trial of cetirizine 10mg PO bid. Use for next 2 months.   - If no improvement will increase to 20mg PO bid.   - If no improvement would send hepatitis serology and HIV.   - Associated dermatographism. 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   - Simvastatin could be associated, but not likely given pruritus not year round.   - Return to clinic in 2 months.

## 2017-04-06 DIAGNOSIS — E78.5 HYPERLIPIDEMIA: ICD-10-CM

## 2017-04-06 LAB
CHOLEST SERPL-MCNC: 182 MG/DL
HDLC SERPL-MCNC: 44 MG/DL
LDLC SERPL CALC-MCNC: 110 MG/DL
NONHDLC SERPL-MCNC: 138 MG/DL
TRIGL SERPL-MCNC: 138 MG/DL

## 2017-04-06 PROCEDURE — 80061 LIPID PANEL: CPT | Performed by: FAMILY MEDICINE

## 2017-04-06 PROCEDURE — 36415 COLL VENOUS BLD VENIPUNCTURE: CPT | Performed by: FAMILY MEDICINE

## 2017-04-06 NOTE — LETTER
[address] [address] [phone]             April 6, 2017    Tiesha Gurrola  [address] [facility] [address] [facility] [address]              Dear Tiesha,    The results of your recent tests were normal.  Enclosed is a copy of the results.     Results for orders placed or performed in visit on 04/06/17   Lipid Profile with reflex to direct LDL   Result Value Ref Range    Cholesterol 182 <200 mg/dL    Triglycerides 138 <150 mg/dL    HDL Cholesterol 44 (L) >49 mg/dL    LDL Cholesterol Calculated 110 (H) <100 mg/dL    Non HDL Cholesterol 138 (H) <130 mg/dL       If you have any questions or concerns, please call the clinic at [phone].      Sincerely,    [facility]

## 2017-04-07 DIAGNOSIS — E78.5 HYPERLIPIDEMIA: ICD-10-CM

## 2017-04-07 RX ORDER — SIMVASTATIN 5 MG
5 TABLET ORAL DAILY
Qty: 90 TABLET | Refills: 3 | Status: SHIPPED | OUTPATIENT
Start: 2017-04-07 | End: 2018-04-09

## 2017-04-07 NOTE — TELEPHONE ENCOUNTER
Prescription approved per [facility] Refill Protocol.  Zully Vargas RN

## 2017-04-07 NOTE — TELEPHONE ENCOUNTER
simvastatin (ZOCOR) 5 MG tablet     Last Written Prescription Date: 3/3/2017  Last Fill Quantity: 30, # refills: 0  Last Office Visit with G, P or [facility] prescribing provider: 3/27/2017       Lab Results   Component Value Date    CHOL 182 04/06/2017     Lab Results   Component Value Date    HDL 44 04/06/2017     Lab Results   Component Value Date     04/06/2017     Lab Results   Component Value Date    TRIG 138 04/06/2017     Lab Results   Component Value Date    CHOLHDLRGEORGIEO 5.8 08/20/2015

## 2017-05-14 ENCOUNTER — OFFICE VISIT (OUTPATIENT)
Dept: URGENT CARE | Facility: RETAIL CLINIC | Age: 67
End: 2017-05-14
Payer: COMMERCIAL

## 2017-05-14 VITALS
OXYGEN SATURATION: 98 % | TEMPERATURE: 99.5 F | HEART RATE: 65 BPM | DIASTOLIC BLOOD PRESSURE: 67 MMHG | SYSTOLIC BLOOD PRESSURE: 134 MMHG

## 2017-05-14 DIAGNOSIS — J20.9 ACUTE BRONCHITIS WITH SYMPTOMS > 10 DAYS: Primary | ICD-10-CM

## 2017-05-14 DIAGNOSIS — J98.01 ACUTE BRONCHOSPASM: ICD-10-CM

## 2017-05-14 PROCEDURE — 99203 OFFICE O/P NEW LOW 30 MIN: CPT | Performed by: PHYSICIAN ASSISTANT

## 2017-05-14 RX ORDER — PREDNISONE 20 MG/1
TABLET ORAL
Qty: 10 TABLET | Refills: 0 | Status: SHIPPED | OUTPATIENT
Start: 2017-05-14 | End: 2017-05-19

## 2017-05-14 NOTE — PROGRESS NOTES
Chief Complaint   Patient presents with     Nasal Congestion     has had a cold 3 weeks     Cough     3 weeks     Headache      SUBJECTIVE:  Tiesha Gurrola is a 66 year old female who presents to the clinic today with a chief complaint of cough  for 3 week(s).  Her cough is described as persistent, daytime and nightime.    The patient's symptoms are moderate and worsening.  Associated symptoms include wheezing, congestion, headache. The patient's symptoms are exacerbated by no particular triggers  Patient has been using tried albuterol inhaler  to improve symptoms.    Past Medical History:   Diagnosis Date     Adhesive capsulitis      Adhesive capsulitis of shoulder 7/30/2013     History of blood transfusion      Hyperlipemia      Mitral valve disorder      Mitral valve disorders     Hx of mitral valve prolapse     OA (osteoarthritis) of hip      Osteoarthritis of acromioclavicular joint 10/17/2012     Paroxysmal atrial fibrillation (H) 7/2008     Paroxysmal supraventricular tachycardia (H)      Rotator cuff tear 10/17/2012     Tobacco abuse      Current Outpatient Prescriptions   Medication Sig Dispense Refill     amoxicillin-clavulanate (AUGMENTIN) 875-125 MG per tablet Take 1 tablet by mouth 2 times daily for 10 days 20 tablet 0     predniSONE (DELTASONE) 20 MG tablet Take 2 tablets once a day for 5 days 10 tablet 0     simvastatin (ZOCOR) 5 MG tablet Take 1 tablet (5 mg) by mouth daily 90 tablet 3     cetirizine (ZYRTEC) 10 MG tablet Take 1 tablet (10 mg) by mouth 2 times daily 60 tablet 11     albuterol (VENTOLIN HFA) 108 (90 BASE) MCG/ACT Inhaler Inhale 2 puffs into the lungs every 4 hours as needed for shortness of breath / dyspnea or wheezing 1 Inhaler 1     triamcinolone (KENALOG) 0.1 % cream Mix entire tube of Kenalog (triamincinolone cream) with 16 oz. Of Cera-Ve or Cetaphil lotion, KEEP REFRIGERATED 80 g 0     escitalopram (LEXAPRO) 20 MG tablet Take 1.5 tablets (30 mg) by mouth daily Due for  appointment 135 tablet 0     ASPIRIN PO Take 325 mg by mouth as needed for moderate pain Reported on 4/5/2017       flecainide acetate 150 MG TABS Take 1 tablet (150 mg) by mouth every 12 hours 180 tablet 3     DiphenhydrAMINE HCl (BENADRYL PO) Reported on 5/14/2017          Allergies   Allergen Reactions     No Known Drug Allergies      Oxycodone Nausea and Vomiting        History   Smoking Status     Current Every Day Smoker     Packs/day: 0.25     Years: 32.00     Types: Cigarettes   Smokeless Tobacco     Never Used     Comment: 7 cigs a day       ROS  Review of systems negative except as stated above.    OBJECTIVE:  /67 (BP Location: Left arm)  Pulse 65  Temp 99.5  F (37.5  C) (Oral)  LMP 05/01/2009  SpO2 98%  GENERAL APPEARANCE: mildly ill appearing  EYES:  conjunctiva clear  HENT: ear canals clear. R TM scarred. L TM gray. Nose and mouth without ulcers, erythema or lesions  NECK: supple, nontender, no lymphadenopathy  RESP: exp rhonchi, insp/exp wheezing in upper ant/poster lobes, no rales, frequent coughing  CV: regular rates and rhythm, normal S1 S2, no murmur noted  SKIN: no suspicious lesions or rashes    ASSESSMENT:    (J20.9) Acute bronchitis with symptoms > 10 days  (J98.01) Acute bronchospasm      PLAN:   amoxicillin-clavulanate (AUGMENTIN) 875-125 MG per tablet  predniSONE (DELTASONE) 20 MG tablet  Take antibiotic as directed  Take oral steroid as directed  Use inhaler as needed  Fluids, rest, cough drops, cough suppressants, humidifier, over the counter pain relievers as needed  Please follow up with primary care provider if not improving, worsening or new symptoms or for any adverse reactions to medications.   May need chest xray if persistent symptoms.     Mirella Lewis PA-C  [facility]

## 2017-05-14 NOTE — NURSING NOTE
"Chief Complaint   Patient presents with     Nasal Congestion     has had a cold 3 weeks     Cough     3 weeks     Headache       Initial /67 (BP Location: Left arm)  Pulse 65  Temp 99.5  F (37.5  C) (Oral)  LMP 05/01/2009  SpO2 98% Estimated body mass index is 37.59 kg/(m^2) as calculated from the following:    Height as of 3/16/17: 5' 3.78\" (1.62 m).    Weight as of 4/5/17: 217 lb 8 oz (98.7 kg).  Medication Reconciliation: complete  "

## 2017-05-14 NOTE — MR AVS SNAPSHOT
"              After Visit Summary   2017    Tiesha Gurrola    MRN: [id number]           Patient Information     Date Of Birth          1950        Visit Information        Provider Department      2017 2:20 PM Mirella Lewis PA-C [facility]        Today's Diagnoses     Acute bronchitis with symptoms > 10 days    -  1    Acute bronchospasm          Care Instructions    Take antibiotic as directed  Take oral steroid for 5 days  Use inhaler as needed  Fluids, rest, cough drops, cough suppressants, humidifier, over the counter pain relievers as needed  Please follow up with primary care provider if not improving, worsening or new symptoms or for any adverse reactions to medications.   May need chest xray if persistent symptoms.         Follow-ups after your visit        Who to contact     You can reach your care team any time of the day by calling [phone].  Notification of test results:  If you have an abnormal lab result, we will notify you by phone as soon as possible.         Additional Information About Your Visit        MyChart Information     [employer] lets you send messages to your doctor, view your test results, renew your prescriptions, schedule appointments and more. To sign up, go to [email or web address] . Click on \"Log in\" on the left side of the screen, which will take you to the Welcome page. Then click on \"Sign up Now\" on the right side of the page.     You will be asked to enter the access code listed below, as well as some personal information. Please follow the directions to create your username and password.     Your access code is: [email or web address]  Expires: 2017  4:57 PM     Your access code will  in 90 days. If you need help or a new code, please call your [place] clinic or [phone].        Care EveryWhere ID     This is your Care EveryWhere ID. This could be used by other organizations to access your [place] medical " records  [id number]        Your Vitals Were     Pulse Temperature Last Period Pulse Oximetry          65 99.5  F (37.5  C) (Oral) 05/01/2009 98%         Blood Pressure from Last 3 Encounters:   05/14/17 134/67   04/05/17 128/74   03/27/17 126/84    Weight from Last 3 Encounters:   04/05/17 217 lb 8 oz (98.7 kg)   03/27/17 215 lb (97.5 kg)   03/16/17 214 lb (97.1 kg)              Today, you had the following     No orders found for display         Today's Medication Changes          These changes are accurate as of: 5/14/17  2:47 PM.  If you have any questions, ask your nurse or doctor.               Start taking these medicines.        Dose/Directions    amoxicillin-clavulanate 875-125 MG per tablet   Commonly known as:  AUGMENTIN   Used for:  Acute bronchitis with symptoms > 10 days   Started by:  Mirella Lewis PA-C        Dose:  1 tablet   Take 1 tablet by mouth 2 times daily for 10 days   Quantity:  20 tablet   Refills:  0       predniSONE 20 MG tablet   Commonly known as:  DELTASONE   Used for:  Acute bronchitis with symptoms > 10 days, Acute bronchospasm   Started by:  Mirella Lewis PA-C        Take 2 tablets once a day for 5 days   Quantity:  10 tablet   Refills:  0            Where to get your medicines      These medications were sent to [id number] - [place], [place] - [address] [address] [facility]  [address] [facility] [address]     Phone:  [phone]     amoxicillin-clavulanate 875-125 MG per tablet    predniSONE 20 MG tablet                Primary Care Provider Office Phone # Fax #    Jessy David [phone]       [facility] [address]  [facility] [address]        Thank you!     Thank you for choosing [facility]  for your care. Our goal is always to provide you with excellent care. Hearing back from our patients is one way we can continue to improve our services. Please take a few minutes to complete the written  survey that you may receive in the mail after your visit with us. Thank you!             Your Updated Medication List - Protect others around you: Learn how to safely use, store and throw away your medicines at www.disposemymeds.org.          This list is accurate as of: 5/14/17  2:47 PM.  Always use your most recent med list.                   Brand Name Dispense Instructions for use    albuterol 108 (90 BASE) MCG/ACT Inhaler    VENTOLIN HFA    1 Inhaler    Inhale 2 puffs into the lungs every 4 hours as needed for shortness of breath / dyspnea or wheezing       amoxicillin-clavulanate 875-125 MG per tablet    AUGMENTIN    20 tablet    Take 1 tablet by mouth 2 times daily for 10 days       ASPIRIN PO      Take 325 mg by mouth as needed for moderate pain Reported on 4/5/2017       BENADRYL PO      Reported on 5/14/2017       cetirizine 10 MG tablet    zyrTEC    60 tablet    Take 1 tablet (10 mg) by mouth 2 times daily       escitalopram 20 MG tablet    LEXAPRO    135 tablet    Take 1.5 tablets (30 mg) by mouth daily Due for appointment       flecainide acetate 150 MG Tabs     180 tablet    Take 1 tablet (150 mg) by mouth every 12 hours       predniSONE 20 MG tablet    DELTASONE    10 tablet    Take 2 tablets once a day for 5 days       simvastatin 5 MG tablet    ZOCOR    90 tablet    Take 1 tablet (5 mg) by mouth daily       triamcinolone 0.1 % cream    KENALOG    80 g    Mix entire tube of Kenalog (triamincinolone cream) with 16 oz. Of Cera-Ve or Cetaphil lotion, KEEP REFRIGERATED

## 2017-05-14 NOTE — PATIENT INSTRUCTIONS
Take antibiotic as directed  Take oral steroid for 5 days  Use inhaler as needed  Fluids, rest, cough drops, cough suppressants, humidifier, over the counter pain relievers as needed  Please follow up with primary care provider if not improving, worsening or new symptoms or for any adverse reactions to medications.   May need chest xray if persistent symptoms.

## 2017-05-18 NOTE — PROGRESS NOTES
SUBJECTIVE:                                                    Tiesha Gurrola is a 66 year old female who presents to clinic today for the following health issues:      History of Present Illness   Depression & Anxiety Follow-up:     Depression/Anxiety:  Anxiety only    Status since last visit::  Stable    Other associated symptoms of anxiety::  None    Significant life event::  No    Current substance use::  Prescription Drugs    Depression symptoms::  None  Diet::  Regular (no restrictions)  Frequency of exercise::  6-7 days/week  Duration of exercise::  15-30 minutes  Taking medications regularly::  Yes  Medication side effects::  None  Additional concerns today::  No        -------------------------------------    Problem list and histories reviewed & adjusted, as indicated.  Additional history: as documented        Patient Active Problem List   Diagnosis     Mitral valve disorder     Tobacco use disorder     A-fib (H)     Anxiety     Major depressive disorder, recurrent episode, moderate (H)     Advanced directives, counseling/discussion     Health Care Home     SVT (supraventricular tachycardia) (H)     Hyperlipidemia     Postmenopausal bleeding     Paroxysmal atrial fibrillation (H)     SOB (shortness of breath)     Pruritic disorder     Dermatographism     Morbid obesity (H)     Past Surgical History:   Procedure Laterality Date     C LAP,UTERUS,UNLISTED PROCEDURE  08/27/2007    Lyis of adhesions of the uterus to the abdominal wall.     C TREAT ECTOPIC PREG,ABD PREG  1974    about 3 mos.     CANALPLASTY Right 6/23/2016    Procedure: CANALPLASTY;  Surgeon: Rishabh Boudreaux MD;  Location: [facility] OR     COLONOSCOPY  07/11/07     DILATION AND CURETTAGE  2/18/16    HD&C     DILATION AND CURETTAGE, OPERATIVE HYSTEROSCOPY, COMBINED N/A 2/18/2016    Procedure: COMBINED DILATION AND CURETTAGE, OPERATIVE HYSTEROSCOPY;  Surgeon: Keshia Chang DO;  Location:  OR     GRAFT [facility] STATUS POST  TYMPANOMASTOIDECTOMY Right 6/30/2016    Procedure: GRAFT THIERSCH STATUS POST TYMPANOMASTOIDECTOMY;  Surgeon: Rishabh Boudreaux MD;  Location: [facility] OR     H ABLATION FOCAL AFIB  10/12/16     HC LAPAROSCOPY, SURGICAL; APPENDECTOMY  08/27/2007     JOINT REPLACEMENT, HIP RT/LT Right 8/12/14    Joint Replacement Hip RT/LT     MEATOPLASTY EAR Right 6/23/2016    Procedure: MEATOPLASTY EAR;  Surgeon: Rishabh Boudreaux MD;  [facility]: [facility] OR     MYRINGOTOMY Right 4/26/2016    Procedure: MYRINGOTOMY;  Surgeon: Evi Solorzano MD;  Location: [facility] OR     SHOULDER SURGERY Left 1/7/15    torn bicep, arthroplasty, rotator cuff     TYMPANOMASTOIDECTOMY Right 6/23/2016    Procedure: TYMPANOMASTOIDECTOMY;  Surgeon: Rishabh Boudreaux MD;  [facility]     TYMPANOMASTOIDECTOMY WITH FACIAL MONITORING  12/13/2011    Procedure:TYMPANOMASTOIDECTOMY WITH FACIAL MONITORING; right tympanoplasty, mastoidectomy with facial nerve monitoring; Surgeon:EVI SOLORZANO; Location:[facility] OR       Social History   Substance Use Topics     Smoking status: Current Every Day Smoker     Packs/day: 0.25     Years: 32.00     Types: Cigarettes     Smokeless tobacco: Never Used      Comment: 7 cigs a day     Alcohol use No     Family History   Problem Relation Age of Onset     Allergies Son      Hayfever     HEART DISEASE Son      Paroxysmal tach.     Arthritis Mother      Depression Mother      depression and anxiety     Respiratory Mother      Asthma     Arthritis Father      HEART DISEASE Father      Lipids Father      Arrhythmia Father      Pacemaker Father      Heart Surgery Father      bypass x3     CANCER Maternal Grandmother      Breast CA     CANCER Paternal Grandmother      ?? pancreatic CA     OSTEOPOROSIS Paternal Grandmother      Neurologic Disorder Daughter      ?? epilepsy     Obesity Daughter      Family History Negative Brother      Family History Negative Son      Family History Negative Brother      DIABETES Other      Maternal cousin  --- [email or web address]     EYE* Other      Niece-- lazy eye     HEART DISEASE Paternal Uncle      death at 56/bypass surgery     HEART DISEASE Paternal Aunt          Current Outpatient Prescriptions   Medication Sig Dispense Refill     escitalopram (LEXAPRO) 20 MG tablet Take 1.5 tablets (30 mg) by mouth daily 135 tablet 1     ACE/ARB NOT PRESCRIBED, INTENTIONAL, Please choose reason not prescribed, below       amoxicillin-clavulanate (AUGMENTIN) 875-125 MG per tablet Take 1 tablet by mouth 2 times daily for 10 days 20 tablet 0     simvastatin (ZOCOR) 5 MG tablet Take 1 tablet (5 mg) by mouth daily 90 tablet 3     DiphenhydrAMINE HCl (BENADRYL PO) Reported on 5/14/2017       cetirizine (ZYRTEC) 10 MG tablet Take 1 tablet (10 mg) by mouth 2 times daily 60 tablet 11     albuterol (VENTOLIN HFA) 108 (90 BASE) MCG/ACT Inhaler Inhale 2 puffs into the lungs every 4 hours as needed for shortness of breath / dyspnea or wheezing 1 Inhaler 1     triamcinolone (KENALOG) 0.1 % cream Mix entire tube of Kenalog (triamincinolone cream) with 16 oz. Of Cera-Ve or Cetaphil lotion, KEEP REFRIGERATED 80 g 0     ASPIRIN PO Take 325 mg by mouth as needed for moderate pain Reported on 4/5/2017       flecainide acetate 150 MG TABS Take 1 tablet (150 mg) by mouth every 12 hours 180 tablet 3     [DISCONTINUED] escitalopram (LEXAPRO) 20 MG tablet TAKE ONE AND ONE HALF TABLET BY MOUTH DAILY**DUE FOR APPOINTMENT 45 tablet 0     Allergies   Allergen Reactions     No Known Drug Allergies      Oxycodone Nausea and Vomiting     BP Readings from Last 3 Encounters:   05/23/17 124/76   05/14/17 134/67   04/05/17 128/74    Wt Readings from Last 3 Encounters:   05/23/17 212 lb (96.2 kg)   04/05/17 217 lb 8 oz (98.7 kg)   03/27/17 215 lb (97.5 kg)                  Labs reviewed in EPIC    ROS:  Constitutional, HEENT, cardiovascular, pulmonary, gi and gu systems are negative, except as otherwise noted.    OBJECTIVE:                                                     /76 (BP Location: Left arm, Patient Position: Chair, Cuff Size: Adult Regular)  Pulse 65  Temp 99.1  F (37.3  C) (Oral)  Resp 16  Wt 212 lb (96.2 kg)  LMP 05/01/2009  SpO2 96%  BMI 36.64 kg/m2  Body mass index is 36.64 kg/(m^2).  Physical Exam   Constitutional: She is oriented to person, place, and time. She appears well-developed and well-nourished.   Cardiovascular: Normal rate and regular rhythm.    Pulmonary/Chest: Effort normal and breath sounds normal.   Neurological: She is alert and oriented to person, place, and time.   Psychiatric: She has a normal mood and affect. Her behavior is normal. Judgment and thought content normal.         Diagnostic Test Results:  none      ASSESSMENT/PLAN:                                                      Problem List Items Addressed This Visit     Major depressive disorder, recurrent episode, moderate (H) - Primary     Reviewed Phq-9 scores  Well controlled - in remission   conitnue lexapro   Refills provided  Recheck in 6 months         Relevant Medications    escitalopram (LEXAPRO) 20 MG tablet    ACE/ARB NOT PRESCRIBED, INTENTIONAL,           Radha Balbuena MD  [facility]

## 2017-05-23 ENCOUNTER — OFFICE VISIT (OUTPATIENT)
Dept: FAMILY MEDICINE | Facility: OTHER | Age: 67
End: 2017-05-23
Payer: COMMERCIAL

## 2017-05-23 VITALS
BODY MASS INDEX: 36.64 KG/M2 | SYSTOLIC BLOOD PRESSURE: 124 MMHG | HEART RATE: 65 BPM | WEIGHT: 212 LBS | RESPIRATION RATE: 16 BRPM | DIASTOLIC BLOOD PRESSURE: 76 MMHG | OXYGEN SATURATION: 96 % | TEMPERATURE: 99.1 F

## 2017-05-23 DIAGNOSIS — F33.1 MAJOR DEPRESSIVE DISORDER, RECURRENT EPISODE, MODERATE (H): Primary | ICD-10-CM

## 2017-05-23 PROBLEM — E66.01 MORBID OBESITY (H): Status: ACTIVE | Noted: 2017-05-23

## 2017-05-23 PROCEDURE — 99213 OFFICE O/P EST LOW 20 MIN: CPT | Performed by: FAMILY MEDICINE

## 2017-05-23 RX ORDER — ESCITALOPRAM OXALATE 20 MG/1
30 TABLET ORAL DAILY
Qty: 135 TABLET | Refills: 1 | Status: SHIPPED | OUTPATIENT
Start: 2017-05-23 | End: 2018-06-01

## 2017-05-23 ASSESSMENT — ANXIETY QUESTIONNAIRES
GAD7 TOTAL SCORE: 3
7. FEELING AFRAID AS IF SOMETHING AWFUL MIGHT HAPPEN: 0 = NOT AT ALL

## 2017-05-23 ASSESSMENT — PAIN SCALES - GENERAL: PAINLEVEL: NO PAIN (0)

## 2017-05-23 NOTE — MR AVS SNAPSHOT
"              After Visit Summary   2017    Tiesha Gurrola    MRN: [id number]           Patient Information     Date Of Birth          1950        Visit Information        Provider Department      2017 11:40 AM Radha Balbuena MD [facility]        Today's Diagnoses     Need for hepatitis C screening test    -  1    Major depressive disorder, recurrent episode, moderate (H)           Follow-ups after your visit        Follow-up notes from your care team     Return in about 6 months (around 2017).      Who to contact     If you have questions or need follow up information about today's clinic visit or your schedule please contact [facility] directly at [phone].  Normal or non-critical lab and imaging results will be communicated to you by MyChart, letter or phone within 4 business days after the clinic has received the results. If you do not hear from us within 7 days, please contact the clinic through [employer] or phone. If you have a critical or abnormal lab result, we will notify you by phone as soon as possible.  Submit refill requests through [employer] or call your pharmacy and they will forward the refill request to us. Please allow 3 business days for your refill to be completed.          Additional Information About Your Visit        MyChart Information     [employer] lets you send messages to your doctor, view your test results, renew your prescriptions, schedule appointments and more. To sign up, go to [email or web address] . Click on \"Log in\" on the left side of the screen, which will take you to the Welcome page. Then click on \"Sign up Now\" on the right side of the page.     You will be asked to enter the access code listed below, as well as some personal information. Please follow the directions to create your username and password.     Your access code is: [email or web address]  Expires: 2017  4:57 PM     Your access code will  in 90 days. If " you need help or a new code, please call your [place] clinic or [phone].        Care EveryWhere ID     This is your Care EveryWhere ID. This could be used by other organizations to access your [place] medical records  [id number]        Your Vitals Were     Pulse Temperature Respirations Last Period Pulse Oximetry BMI (Body Mass Index)    65 99.1  F (37.3  C) (Oral) 16 05/01/2009 96% 36.64 kg/m2       Blood Pressure from Last 3 Encounters:   05/23/17 124/76   05/14/17 134/67   04/05/17 128/74    Weight from Last 3 Encounters:   05/23/17 212 lb (96.2 kg)   04/05/17 217 lb 8 oz (98.7 kg)   03/27/17 215 lb (97.5 kg)              We Performed the Following     Hepatitis C Screen Reflex to HCV RNA Quant and Genotype          Today's Medication Changes          These changes are accurate as of: 5/23/17 12:08 PM.  If you have any questions, ask your nurse or doctor.               Start taking these medicines.        Dose/Directions    ACE/ARB NOT PRESCRIBED (INTENTIONAL)   Used for:  Major depressive disorder, recurrent episode, moderate (H)   Started by:  Radha Balbuena MD        Please choose reason not prescribed, below   Refills:  0         These medicines have changed or have updated prescriptions.        Dose/Directions    escitalopram 20 MG tablet   Commonly known as:  LEXAPRO   This may have changed:  See the new instructions.   Used for:  Major depressive disorder, recurrent episode, moderate (H)   Changed by:  Radha Balbuena MD        Dose:  30 mg   Take 1.5 tablets (30 mg) by mouth daily   Quantity:  135 tablet   Refills:  1            Where to get your medicines      Some of these will need a paper prescription and others can be bought over the counter.  Ask your nurse if you have questions.     Bring a paper prescription for each of these medications     escitalopram 20 MG tablet       You don't need a prescription for these medications     ACE/ARB NOT PRESCRIBED (INTENTIONAL)                 Primary Care Provider Office Phone # Fax #    Jessy ABHI David [phone]       [facility] [address] [facility] [address]  [facility] [address]        Thank you!     Thank you for choosing [facility]  for your care. Our goal is always to provide you with excellent care. Hearing back from our patients is one way we can continue to improve our services. Please take a few minutes to complete the written survey that you may receive in the mail after your visit with us. Thank you!             Your Updated Medication List - Protect others around you: Learn how to safely use, store and throw away your medicines at www.disposemymeds.org.          This list is accurate as of: 5/23/17 12:08 PM.  Always use your most recent med list.                   Brand Name Dispense Instructions for use    ACE/ARB NOT PRESCRIBED (INTENTIONAL)      Please choose reason not prescribed, below       albuterol 108 (90 BASE) MCG/ACT Inhaler    VENTOLIN HFA    1 Inhaler    Inhale 2 puffs into the lungs every 4 hours as needed for shortness of breath / dyspnea or wheezing       amoxicillin-clavulanate 875-125 MG per tablet    AUGMENTIN    20 tablet    Take 1 tablet by mouth 2 times daily for 10 days       ASPIRIN PO      Take 325 mg by mouth as needed for moderate pain Reported on 4/5/2017       BENADRYL PO      Reported on 5/14/2017       cetirizine 10 MG tablet    zyrTEC    60 tablet    Take 1 tablet (10 mg) by mouth 2 times daily       escitalopram 20 MG tablet    LEXAPRO    135 tablet    Take 1.5 tablets (30 mg) by mouth daily       flecainide acetate 150 MG Tabs     180 tablet    Take 1 tablet (150 mg) by mouth every 12 hours       simvastatin 5 MG tablet    ZOCOR    90 tablet    Take 1 tablet (5 mg) by mouth daily       triamcinolone 0.1 % cream    KENALOG    80 g    Mix entire tube of Kenalog (triamincinolone cream) with 16 oz. Of Cera-Ve or Cetaphil lotion, KEEP REFRIGERATED

## 2017-05-23 NOTE — ASSESSMENT & PLAN NOTE
Reviewed Phq-9 scores  Well controlled - in remission   conitnue lexapro   Refills provided  Recheck in 6 months

## 2017-05-23 NOTE — NURSING NOTE
"Chief Complaint   Patient presents with     Depression     Panel Management     raheem ferrara, hep c, phq/magali       Initial /76 (BP Location: Left arm, Patient Position: Chair, Cuff Size: Adult Regular)  Pulse 65  Temp 99.1  F (37.3  C) (Oral)  Resp 16  Wt 212 lb (96.2 kg)  LMP 05/01/2009  SpO2 96%  BMI 36.64 kg/m2 Estimated body mass index is 36.64 kg/(m^2) as calculated from the following:    Height as of 3/16/17: 5' 3.78\" (1.62 m).    Weight as of this encounter: 212 lb (96.2 kg).  Medication Reconciliation: complete   Maribel Bob CMA (AAMA)      "

## 2017-06-09 ENCOUNTER — TELEPHONE (OUTPATIENT)
Dept: FAMILY MEDICINE | Facility: OTHER | Age: 67
End: 2017-06-09

## 2017-06-09 ENCOUNTER — RADIANT APPOINTMENT (OUTPATIENT)
Dept: GENERAL RADIOLOGY | Facility: OTHER | Age: 67
End: 2017-06-09
Attending: PHYSICIAN ASSISTANT
Payer: COMMERCIAL

## 2017-06-09 ENCOUNTER — OFFICE VISIT (OUTPATIENT)
Dept: FAMILY MEDICINE | Facility: OTHER | Age: 67
End: 2017-06-09
Payer: COMMERCIAL

## 2017-06-09 VITALS
DIASTOLIC BLOOD PRESSURE: 74 MMHG | SYSTOLIC BLOOD PRESSURE: 128 MMHG | BODY MASS INDEX: 38.13 KG/M2 | HEART RATE: 76 BPM | TEMPERATURE: 98.6 F | WEIGHT: 220.6 LBS | RESPIRATION RATE: 18 BRPM

## 2017-06-09 DIAGNOSIS — M25.512 ACUTE PAIN OF LEFT SHOULDER: Primary | ICD-10-CM

## 2017-06-09 DIAGNOSIS — M54.2 CERVICALGIA: ICD-10-CM

## 2017-06-09 DIAGNOSIS — M25.512 ACUTE PAIN OF LEFT SHOULDER: ICD-10-CM

## 2017-06-09 DIAGNOSIS — M19.019 ACROMIOCLAVICULAR JOINT ARTHRITIS: ICD-10-CM

## 2017-06-09 DIAGNOSIS — M75.42 IMPINGEMENT SYNDROME OF LEFT SHOULDER: ICD-10-CM

## 2017-06-09 PROCEDURE — 73030 X-RAY EXAM OF SHOULDER: CPT | Mod: LT

## 2017-06-09 PROCEDURE — 72040 X-RAY EXAM NECK SPINE 2-3 VW: CPT

## 2017-06-09 PROCEDURE — 99214 OFFICE O/P EST MOD 30 MIN: CPT | Performed by: PHYSICIAN ASSISTANT

## 2017-06-09 RX ORDER — TIZANIDINE 2 MG/1
2 TABLET ORAL 3 TIMES DAILY
Qty: 40 TABLET | Refills: 1 | Status: SHIPPED | OUTPATIENT
Start: 2017-06-09 | End: 2018-08-14

## 2017-06-09 RX ORDER — METHYLPREDNISOLONE 4 MG
TABLET, DOSE PACK ORAL
Qty: 21 TABLET | Refills: 0 | Status: SHIPPED | OUTPATIENT
Start: 2017-06-09 | End: 2017-11-16

## 2017-06-09 ASSESSMENT — PAIN SCALES - GENERAL: PAINLEVEL: WORST PAIN (10)

## 2017-06-09 NOTE — PATIENT INSTRUCTIONS
Will order updated x-rays of your neck and shoulder.  I think you have a muscular strain that is causing your worsening pain but there may be a nerve being irritated as well.  Will prescribe a short course of steroids to use as directed to help with the pain and swelling.  Will also prescribe Zanaflex which is a muscle relaxer to use 3 times daily as needed. Start this at night as it can make you sleepy.  Perform home exercises for your shoulders and neck.   Continue to ice the area for 15 minutes every 1-2 hours.  If symptoms are not improving over the next few weeks, let me know and we may need to try physical therapy or order an MRI.

## 2017-06-09 NOTE — PROGRESS NOTES
SUBJECTIVE:                                                    Tiesha Gurrola is a 66 year old female who presents to clinic today for the following health issues:      HPI    Concern - left shoulder pain      Onset: 1.5 weeks     Description:   Had a work injury (refridgerator fell on her) a few years ago. Recently the shoulder has been hurting again. No real reinjury.     Intensity: moderate    Progression of Symptoms:  same    Accompanying Signs & Symptoms:  -pain in ball of shoulder and on left side of neck  -radiates down arm to wrist at times   -very sharp at times  -hurts more than before surgery at times        Previous history of similar problem:   Yes, original injury years ago      Precipitating factors:   Worsened by: using it, any movement     Alleviating factors:  Improved by: cold        Therapies Tried and outcome: cold, ibuprofen    Has undergone surgery on both shoulders but states it has been 3-4 years. For the past week, she has had worsening pain in the left anterior shoulder and left neck. She denies any recent fall or injury. Ice has been helping for short periods of time but then the pain returns and she has trouble sleeping. She describes intermittent sharp pain down her left arm into her forearm as well with occasional tingling into her left hand. She denies any left arm weakness. She states that she has undergone PT at [facility] before and this has been helpful for her shoulders.     Problem list and histories reviewed & adjusted, as indicated.  Additional history: none    ROS:  GENERAL: Denies fever, fatigue, weakness, weight gain, or weight loss.  CARDIOVASCULAR: Denies chest pain, shortness of breath, irregular heartbeats,  palpitations, or edema.  RESPIRATORY: Denies cough, hemoptysis, and shortness of breath.  MUSCULOSKELETAL: +Left neck pain, left anterior shoulder pain.   NEUROLOGIC: +Occasional left hand tingling. Denies headache, fainting, dizziness, memory loss, or  seizures.    OBJECTIVE:                                                    /74 (BP Location: Right arm, Patient Position: Chair, Cuff Size: Adult Large)  Pulse 76  Temp 98.6  F (37  C) (Oral)  Resp 18  Wt 220 lb 9.6 oz (100.1 kg)  LMP 05/01/2009  BMI 38.13 kg/m2  Body mass index is 38.13 kg/(m^2).  GENERAL: healthy, alert and no distress  RESP: lungs clear to auscultation - no rales, rhonchi or wheezes  CV: regular rate and rhythm, normal S1 S2, no S3 or S4, no murmur, click or rub  MS: no gross musculoskeletal defects noted, no edema. Tenderness over left cervical paraspinal musculature and left trapezius. Decreased left neck rotation due to pain. Tenderness over the left anterior shoulder and upper pectoral muscle as well the the proximal biceps tendon. Significantly decreased shoulder abduction and external rotation bilaterally. Full strength with negative Jacobson and empty can testing.   NEURO: Normal strength and tone, mentation intact and speech normal. Cranial nerves II-XII are grossly intact. DTRs are 2+/4 throughout and symmetric. Gait is stable.     XR Cervical Spine 6/9/17  IMPRESSION: Limited visualization of the lower cervical spine in the  lateral projection. Where visualized, there is no evidence of  fracture. There is mild to moderate degenerative disc disease at C5-6.  Mild retrolisthesis at this level is unchanged.    XR Left Shoulder 6/9/17  IMPRESSION: There are moderate degenerative changes of the  acromioclavicular joint. Postoperative changes in the distal clavicle.  No evidence of acute fracture.     ASSESSMENT/PLAN:                                                        ICD-10-CM    1. Acute pain of left shoulder M25.512 XR Shoulder Left G/E 3 Views     methylPREDNISolone (MEDROL DOSEPAK) 4 MG tablet     tiZANidine (ZANAFLEX) 2 MG tablet   2. 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 Impingement syndrome of left shoulder M75.42 methylPREDNISolone (MEDROL DOSEPAK) 4 MG tablet      tiZANidine (ZANAFLEX) 2 MG tablet   4. Cervicalgia M54.2 XR Cervical Spine 2/3 Views     methylPREDNISolone (MEDROL DOSEPAK) 4 MG tablet     tiZANidine (ZANAFLEX) 2 MG tablet       X-rays of the shoulder reveal moderate AC joint arthritis and the cervical spine x-ray reveals DDD at C5-C6 with mild retrolisthesis, unchanged since 2012.  I think her symptoms are due to a combination of a muscular strain, AC joint arthritis, impingement syndrome, and cervical DDD with nerve irritation causing her left arm radicular pain.  Will prescribe a short course of steroids to use as directed to help with the pain and swelling.   Will also prescribe Zanaflex to use 3 times daily as needed. Instructed to start this at night as it can cause drowsiness.  Instructed to perform home exercises for shoulders and neck.   Continue to ice the area for 15 minutes every 1-2 hours and continue with NSAIDs.   If symptoms are not improving over the next few weeks, she will let me know and we may need to re-try physical therapy. I do not think an MRI is warranted at this time but if symptoms worsen, it will be considered.     Greater than 50% of 25 minute visit spent on counseling and plan of care regarding patient's neck and shoulder pain.     Issac Perez PA-C  [facility]

## 2017-06-09 NOTE — TELEPHONE ENCOUNTER
----- Message from Issac Perez PA-C sent at 6/9/2017 12:34 PM CDT -----  Please call and notify patient that there is arthritis of the AC joint in her shoulder as well as in her neck which is likely contributing to her pain. I recommend she follow my instructions from today and if pain is not improving, we can try physical therapy again. Thanks.    Issac Perez PA-C

## 2017-06-09 NOTE — NURSING NOTE
"Chief Complaint   Patient presents with     Shoulder Pain     left        Initial /74 (BP Location: Right arm, Patient Position: Chair, Cuff Size: Adult Large)  Pulse 76  Temp 98.6  F (37  C) (Oral)  Resp 18  Wt 220 lb 9.6 oz (100.1 kg)  LMP 05/01/2009  BMI 38.13 kg/m2 Estimated body mass index is 38.13 kg/(m^2) as calculated from the following:    Height as of 3/16/17: 5' 3.78\" (1.62 m).    Weight as of this encounter: 220 lb 9.6 oz (100.1 kg).  Medication Reconciliation: complete    "

## 2017-06-09 NOTE — MR AVS SNAPSHOT
After Visit Summary   6/9/2017    Tiesha Gurrola    MRN: [id number]           Patient Information     Date Of Birth          1950        Visit Information        Provider Department      6/9/2017 9:30 AM Issac Perez PA-C [facility]        Today's Diagnoses     Acute pain of left shoulder    -  1    Impingement syndrome of left shoulder        Cervicalgia          Care Instructions    Will order updated x-rays of your neck and shoulder.  I think you have a muscular strain that is causing your worsening pain but there may be a nerve being irritated as well.  Will prescribe a short course of steroids to use as directed to help with the pain and swelling.  Will also prescribe Zanaflex which is a muscle relaxer to use 3 times daily as needed. Start this at night as it can make you sleepy.  Perform home exercises for your shoulders and neck.   Continue to ice the area for 15 minutes every 1-2 hours.  If symptoms are not improving over the next few weeks, let me know and we may need to try physical therapy or order an MRI.                Follow-ups after your visit        Future tests that were ordered for you today     Open Future Orders        Priority Expected Expires Ordered    XR Shoulder Left G/E 3 Views Routine 6/9/2017 6/9/2018 6/9/2017    XR Cervical Spine 2/3 Views Routine 6/9/2017 6/9/2018 6/9/2017            Who to contact     If you have questions or need follow up information about today's clinic visit or your schedule please contact [facility] directly at [phone].  Normal or non-critical lab and imaging results will be communicated to you by MyChart, letter or phone within 4 business days after the clinic has received the results. If you do not hear from us within 7 days, please contact the clinic through MyChart or phone. If you have a critical or abnormal lab result, we will notify you by phone as soon as possible.  Submit refill  "requests through [employer] or call your pharmacy and they will forward the refill request to us. Please allow 3 business days for your refill to be completed.          Additional Information About Your Visit        [employer] Information     [employer] lets you send messages to your doctor, view your test results, renew your prescriptions, schedule appointments and more. To sign up, go to [email or web address] . Click on \"Log in\" on the left side of the screen, which will take you to the Welcome page. Then click on \"Sign up Now\" on the right side of the page.     You will be asked to enter the access code listed below, as well as some personal information. Please follow the directions to create your username and password.     Your access code is: [email or web address]  Expires: 2017 10:11 AM     Your access code will  in 90 days. If you need help or a new code, please call your [place] clinic or [phone].        Care EveryWhere ID     This is your Care EveryWhere ID. This could be used by other organizations to access your [place] medical records  [id number]        Your Vitals Were     Pulse Temperature Respirations Last Period BMI (Body Mass Index)       76 98.6  F (37  C) (Oral) 18 2009 38.13 kg/m2        Blood Pressure from Last 3 Encounters:   17 128/74   17 124/76   17 134/67    Weight from Last 3 Encounters:   17 220 lb 9.6 oz (100.1 kg)   17 212 lb (96.2 kg)   17 217 lb 8 oz (98.7 kg)                 Today's Medication Changes          These changes are accurate as of: 17 10:11 AM.  If you have any questions, ask your nurse or doctor.               Start taking these medicines.        Dose/Directions    methylPREDNISolone 4 MG tablet   Commonly known as:  MEDROL DOSEPAK   Used for:  Acute pain of left shoulder, Cervicalgia, Impingement syndrome of left shoulder   Started by:  Issac Perez PA-C        Follow package instructions   Quantity:  21 tablet "   Refills:  0       tiZANidine 2 MG tablet   Commonly known as:  ZANAFLEX   Used for:  Acute pain of left shoulder, Impingement syndrome of left shoulder, Cervicalgia   Started by:  Issac Perez PA-C        Dose:  2 mg   Take 1 tablet (2 mg) by mouth 3 times daily   Quantity:  40 tablet   Refills:  1            Where to get your medicines      These medications were sent to [facility] [place] [place] - [address]  [address], [facility] [address]     Phone:  [phone]     methylPREDNISolone 4 MG tablet    tiZANidine 2 MG tablet                Primary Care Provider Office Phone # Fax #    Jessy ABHI David [phone]       [facility] [address]  [facility] [address]        Thank you!     Thank you for choosing [facility]  for your care. Our goal is always to provide you with excellent care. Hearing back from our patients is one way we can continue to improve our services. Please take a few minutes to complete the written survey that you may receive in the mail after your visit with us. Thank you!             Your Updated Medication List - Protect others around you: Learn how to safely use, store and throw away your medicines at www.disposemymeds.org.          This list is accurate as of: 6/9/17 10:11 AM.  Always use your most recent med list.                   Brand Name Dispense Instructions for use    ACE/ARB NOT PRESCRIBED (INTENTIONAL)      Please choose reason not prescribed, below       albuterol 108 (90 BASE) MCG/ACT Inhaler    VENTOLIN HFA    1 Inhaler    Inhale 2 puffs into the lungs every 4 hours as needed for shortness of breath / dyspnea or wheezing       ASPIRIN PO      Take 325 mg by mouth as needed for moderate pain Reported on 4/5/2017       BENADRYL PO      Reported on 5/14/2017       cetirizine 10 MG tablet    zyrTEC    60 tablet    Take 1 tablet (10 mg) by mouth 2 times daily       escitalopram 20 MG tablet     LEXAPRO    135 tablet    Take 1.5 tablets (30 mg) by mouth daily       flecainide acetate 150 MG Tabs     180 tablet    Take 1 tablet (150 mg) by mouth every 12 hours       methylPREDNISolone 4 MG tablet    MEDROL DOSEPAK    21 tablet    Follow package instructions       simvastatin 5 MG tablet    ZOCOR    90 tablet    Take 1 tablet (5 mg) by mouth daily       tiZANidine 2 MG tablet    ZANAFLEX    40 tablet    Take 1 tablet (2 mg) by mouth 3 times daily       triamcinolone 0.1 % cream    KENALOG    80 g    Mix entire tube of Kenalog (triamincinolone cream) with 16 oz. Of Cera-Ve or Cetaphil lotion, KEEP REFRIGERATED

## 2017-06-23 ENCOUNTER — TELEPHONE (OUTPATIENT)
Dept: FAMILY MEDICINE | Facility: OTHER | Age: 67
End: 2017-06-23

## 2017-06-23 DIAGNOSIS — M19.019 AC (ACROMIOCLAVICULAR) JOINT ARTHRITIS: Primary | ICD-10-CM

## 2017-06-23 DIAGNOSIS — M54.2 CERVICALGIA: ICD-10-CM

## 2017-06-23 DIAGNOSIS — M75.42 IMPINGEMENT SYNDROME OF LEFT SHOULDER: ICD-10-CM

## 2017-06-23 NOTE — TELEPHONE ENCOUNTER
[facility] phone call message- patient request for an order or referral:    Order or referral being requested:Sister Efra for  arm pain/Perez told her this would be the next step  Reason for request: arm   Date needed: as soon as possible  Has the patient been seen by the PCP for this problem? YES    Additional comments:     OK to leave the result message on voice mail or with a family member? NO    Call taken on 6/23/2017 at 11:43 AM by Harika Bean

## 2017-06-23 NOTE — TELEPHONE ENCOUNTER
PT order placed. Please fax to [facility] as she states this is where she was seen before.    Issac Perez PA-C

## 2017-07-10 DIAGNOSIS — F33.1 MAJOR DEPRESSIVE DISORDER, RECURRENT EPISODE, MODERATE (H): ICD-10-CM

## 2017-07-10 NOTE — TELEPHONE ENCOUNTER
lexapro     Last Written Prescription Date: 05/23/17  Last Fill Quantity: 135, # refills: 1  Last Office Visit with G primary care provider:  06/09/17        Last PHQ-9 score on record=   PHQ-9 SCORE 5/23/2017   Total Score -   Total Score MyChart 3 (Minimal depression)   Total Score -

## 2017-07-13 RX ORDER — ESCITALOPRAM OXALATE 20 MG/1
TABLET ORAL
Qty: 45 TABLET | Refills: 5 | Status: SHIPPED | OUTPATIENT
Start: 2017-07-13 | End: 2018-06-01

## 2017-07-14 NOTE — TELEPHONE ENCOUNTER
Prescription approved per [facility] Refill Protocol.  Casa Barroso, RN, BSN

## 2017-08-01 ENCOUNTER — OFFICE VISIT (OUTPATIENT)
Dept: ALLERGY | Facility: OTHER | Age: 67
End: 2017-08-01
Payer: COMMERCIAL

## 2017-08-01 VITALS
WEIGHT: 219 LBS | SYSTOLIC BLOOD PRESSURE: 136 MMHG | OXYGEN SATURATION: 96 % | HEART RATE: 66 BPM | DIASTOLIC BLOOD PRESSURE: 66 MMHG | BODY MASS INDEX: 37.85 KG/M2

## 2017-08-01 DIAGNOSIS — L56.4 POLYMORPHIC LIGHT ERUPTION: Primary | ICD-10-CM

## 2017-08-01 DIAGNOSIS — R06.02 SOB (SHORTNESS OF BREATH): ICD-10-CM

## 2017-08-01 DIAGNOSIS — L50.3 DERMATOGRAPHISM: ICD-10-CM

## 2017-08-01 DIAGNOSIS — L29.9 PRURITIC DISORDER: ICD-10-CM

## 2017-08-01 PROCEDURE — 99214 OFFICE O/P EST MOD 30 MIN: CPT | Performed by: ALLERGY & IMMUNOLOGY

## 2017-08-01 NOTE — PATIENT INSTRUCTIONS
Allergy Staff Appt Hours Shot Hours Locations    Physician     Rell Huber DO       Support Staff     Pam CH RN      Aletha MENDIETA MA  Monday:                      [place] 8-7     Tuesday:         [place] 8-5 Wednesday:        [place]: 7-5     Friday:        [place] 7-5   [place]        Monday: 9-6        Friday: 7-2     [place]        Tuesday: 7-12 Thursday: 1-6 [place] Tuesday: 1-6 Wednesday: 11-6 Thursday: 7-12 [facility]  [address] [place] [address]  Appt Line: [phone]  Allergy RN (Monday):  [phone]    [facility]  [address] [place] [address]  Appt Line: [phone]  Allergy RN (Tues & Wed):  [phone]    [facility]  [address] [place] [address]  Appt Line: [phone]  Allergy RN (Friday):  [phone]       Important Scheduling Information  Aspirin Desensitization: Appt will last 2 clinic days. Please call the Allergy RN line for your clinic to schedule. Discontinue antihistamines 7 days prior to the appointment.     Food Challenges: Appt will last 3-4 hours. Please call the Allergy RN line for your clinic to schedule. Discontinue antihistamines 7 days prior to the appointment.     Penicillin Testing: Appt will last 2-3 hours. Please call the Allergy RN line for your clinic to schedule. Discontinue antihistamines 7 days prior to the appointment.     Skin Testing: Appt will about 40 minutes. Call the appointment line for your clinic to schedule. Discontinue antihistamines 7 days prior to the appointment.     Venom Testing: Appt will last 2-3 hours. Please call the Allergy RN line for your clinic to schedule. Discontinue antihistamines 7 days prior to the appointment.     Thank you for trusting us with your Allergy, Asthma, and Immunology care. Please feel free to contact us with any questions or concerns you may have.      - Continue cetirizine daily. Increase the morning dose to 20mg by mouth  daily in the morning and then 10mg by mouth in the evening.   - If continue to have itching increase to 20mg by mouth twice daily. If you do please call our office.   - Albuterol 2-4 puffs inhaled (use a spacer unless using a Proair Respiclick device) every 4 hours as needed for chest tightness, wheezing, shortness of breath and/or coughing.   - Wear sunscreen when outdoors. 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   - Return to clinic in 6 months.

## 2017-08-01 NOTE — NURSING NOTE
"Chief Complaint   Patient presents with     RECHECK     allergy       Initial /66 (BP Location: Left arm, Patient Position: Sitting, Cuff Size: Adult Large)  Pulse 66  Wt 219 lb (99.3 kg)  LMP 05/01/2009  SpO2 96%  BMI 37.85 kg/m2 Estimated body mass index is 37.85 kg/(m^2) as calculated from the following:    Height as of 3/16/17: 5' 3.78\" (1.62 m).    Weight as of this encounter: 219 lb (99.3 kg).  Medication Reconciliation: complete   Aletha Power MA      "

## 2017-08-01 NOTE — ASSESSMENT & PLAN NOTE
History of diffuse itching over last few months. Improved mostly with cetirizine 10mg PO bid, but not completely. Can involve palms, feet, ankles, heads and chest.If itches at skin she will develop welts. No systemic symptoms.      Blood work evaluation including CBC, CMP, TSH, C3, C4, MOE, Uric acid and ESR. CRP elevated. History of blood transfusion.      - Cetirizine 20mg PO qam and 10mg PO qpm.  - If no improvement will increase to 20mg PO bid.   - If no improvement would send hepatitis serology and HIV.   - Associated dermatographism. 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   - Return to clinic in 6 months.

## 2017-08-01 NOTE — ASSESSMENT & PLAN NOTE
History of shortness of breath intermittently over the last few years. Occur 3-4 times yearly. Occurred once since last visit. She did not use albuterol. Associated with wheezing.Symptoms last 10-15 minutes.       Spirometry normal at last visit.     - Albuterol 2-4 puffs inhaled (use a spacer unless using a Proair Respiclick device) every 4 hours as needed for chest tightness, wheezing, shortness of breath and/or coughing.

## 2017-08-01 NOTE — ASSESSMENT & PLAN NOTE
Symptomatic.      - Cetirizine 20mg PO qam and 10mg PO qpm. If symptoms continue then increase to 20mg PO bid.

## 2017-08-01 NOTE — ASSESSMENT & PLAN NOTE
Involving sun exposed skin. Erythematous and raised skin that is mildly pruritic that occurs greater than 24 hours after sun exposure and will last for up to one week.    - Continue antihistamines as noted.  - Sunscreen when outdoors.  - Long shirts when outdoors.  - Avoid prolonged sun exposure.

## 2017-08-01 NOTE — PROGRESS NOTES
Tiesha Gurrola is a 66 year old White female with previous medical history significant for shortness of breath, diffuse pruritus, atrial fibrillation who returns for a follow up visit. Tiesha Gurrola is being seen today for diffuse pruritus, shortness of breath.     Patient was last seen in April 2017. At that time she started on cetirizine 10 mg by mouth twice daily. This has been significantly beneficial for her diffuse pruritic disorder. She will have occasional mild episodes of pruritus involving her palms of her hands and other areas on her arms. This is manageable. She is tolerating being on cetirizine. She does note that if she is outside in the sun for prolonged period of time on areas of sun exposed skin she will develop erythema and raised skin that is mildly pruritic. This occurs greater than 24 hours after sun exposure. The symptoms last for 1 week. At last visit the patient had shortness of breath and wheezing a few times per year. This occurred with no clear etiology. She was prescribed an albuterol inhaler to use on an as-needed basis. She had these symptoms on one occasion since last visit and she did not use albuterol. Symptoms typically resolve within 10-15 minutes on own.         Past Medical History:   Diagnosis Date     Adhesive capsulitis      Adhesive capsulitis of shoulder 7/30/2013     History of blood transfusion      Hyperlipemia      Mitral valve disorder     Hx of mitral valve prolapse     Mitral valve disorder      OA (osteoarthritis) of hip      Osteoarthritis of acromioclavicular joint 10/17/2012     Paroxysmal atrial fibrillation (H) 7/2008     Paroxysmal supraventricular tachycardia (H)      Rotator cuff tear 10/17/2012     Tobacco abuse      Family History   Problem Relation Age of Onset     Allergies Son      Hayfever     HEART DISEASE Son      Paroxysmal tach.     Arthritis Mother      Depression Mother      depression and anxiety     Respiratory Mother      Asthma      Arthritis Father      HEART DISEASE Father      Lipids Father      Arrhythmia Father      Pacemaker Father      Heart Surgery Father      bypass x3     CANCER Maternal Grandmother      Breast CA     CANCER Paternal Grandmother      ?? pancreatic CA     OSTEOPOROSIS Paternal Grandmother      Neurologic Disorder Daughter      ?? epilepsy     Obesity Daughter      Family History Negative Brother      Family History Negative Son      Family History Negative Brother      DIABETES Other      Maternal cousin --- [email or web address]     EYE* Other      Niece-- lazy eye     HEART DISEASE Paternal Uncle      death at 56/bypass surgery     HEART DISEASE Paternal Aunt      Past Surgical History:   Procedure Laterality Date     C LAP,UTERUS,UNLISTED PROCEDURE  08/27/2007    Lyis of adhesions of the uterus to the abdominal wall.     C TREAT ECTOPIC PREG,ABD PREG  1974    about 3 mos.     CANALPLASTY Right 6/23/2016    Procedure: CANALPLASTY;  Surgeon: Rishabh Boudreaux MD;  Location: [facility]     COLONOSCOPY  07/11/07     DILATION AND CURETTAGE  2/18/16    HD&C     DILATION AND CURETTAGE, OPERATIVE HYSTEROSCOPY, COMBINED N/A 2/18/2016    Procedure: COMBINED DILATION AND CURETTAGE, OPERATIVE HYSTEROSCOPY;  Surgeon: Keshia Chang DO;  Location: [facility]     GRAFT THIERSCH STATUS POST TYMPANOMASTOIDECTOMY Right 6/30/2016    Procedure: GRAFT THIERSCH STATUS POST TYMPANOMASTOIDECTOMY;  Surgeon: Rishabh Boudreaux MD;  Location: [facility]     H ABLATION FOCAL AFIB  10/12/16     HC LAPAROSCOPY, SURGICAL; APPENDECTOMY  08/27/2007     JOINT REPLACEMENT, HIP RT/LT Right 8/12/14    Joint Replacement Hip RT/LT     MEATOPLASTY EAR Right 6/23/2016    Procedure: MEATOPLASTY EAR;  Surgeon: Rishabh Boudreaux MD;  [facility]     MYRINGOTOMY Right 4/26/2016    Procedure: MYRINGOTOMY;  Surgeon: Jake Solorzano MD;  Location: [facility]     SHOULDER SURGERY Left 1/7/15    torn bicep, arthroplasty, rotator cuff     TYMPANOMASTOIDECTOMY Right 6/23/2016     Procedure: TYMPANOMASTOIDECTOMY;  Surgeon: Rishabh Boudreaux MD;  [facility]     TYMPANOMASTOIDECTOMY WITH FACIAL MONITORING  12/13/2011    Procedure:TYMPANOMASTOIDECTOMY WITH FACIAL MONITORING; right tympanoplasty, mastoidectomy with facial nerve monitoring; Surgeon:EVI LLAMAS; Location:[facility]       REVIEW OF SYSTEMS:  General: positive  for weight gain. negative for weight loss. negative for changes in sleep.   Ears: positive  for fullness. positive  for hearing loss. positive  for dizziness.   Nose: positive  for snoring.negative for changes in smell. positive  for drainage.   Throat: negative for hoarseness. negative for sore throat. negative for trouble swallowing.   Lungs: positive  for shortness of breath.negative for wheezing. positive  for sputum production.   Cardiovascular: positive  for chest pain. negative for swelling of ankles. positive  for fast or irregular heartbeat.   Gastrointestinal: negative for nausea. positive  for heartburn. negative for acid reflux.   Musculoskeletal: positive  for joint pain. positive  for joint stiffness. positive  for joint swelling.   Neurologic: negative for seizures. negative for fainting. negative for weakness.   Psychiatric: positive  for changes in mood. positive  for anxiety.   Endocrine: negative for cold intolerance. positive  for heat intolerance. positive  for tremors.   Hematologic: positive  for easy bruising. positive  for easy bleeding.  Integumentary: negative for rash. positive  for scaling. negative for nail changes.       Current Outpatient Prescriptions:      escitalopram (LEXAPRO) 20 MG tablet, TAKE ONE AND ONE-HALF TABLETS BY MOUTH EVERY DAY **DUE FOR APPOINTMENT**, Disp: 45 tablet, Rfl: 5     escitalopram (LEXAPRO) 20 MG tablet, Take 1.5 tablets (30 mg) by mouth daily, Disp: 135 tablet, Rfl: 1     ACE/ARB NOT PRESCRIBED, INTENTIONAL,, Please choose reason not prescribed, below, Disp: , Rfl:      simvastatin (ZOCOR) 5 MG tablet, Take 1  tablet (5 mg) by mouth daily, Disp: 90 tablet, Rfl: 3     cetirizine (ZYRTEC) 10 MG tablet, Take 1 tablet (10 mg) by mouth 2 times daily, Disp: 60 tablet, Rfl: 11     albuterol (VENTOLIN HFA) 108 (90 BASE) MCG/ACT Inhaler, Inhale 2 puffs into the lungs every 4 hours as needed for shortness of breath / dyspnea or wheezing, Disp: 1 Inhaler, Rfl: 1     triamcinolone (KENALOG) 0.1 % cream, Mix entire tube of Kenalog (triamincinolone cream) with 16 oz. Of Cera-Ve or Cetaphil lotion, KEEP REFRIGERATED, Disp: 80 g, Rfl: 0     flecainide acetate 150 MG TABS, Take 1 tablet (150 mg) by mouth every 12 hours, Disp: 180 tablet, Rfl: 3     methylPREDNISolone (MEDROL DOSEPAK) 4 MG tablet, Follow package instructions (Patient not taking: Reported on 8/1/2017), Disp: 21 tablet, Rfl: 0     tiZANidine (ZANAFLEX) 2 MG tablet, Take 1 tablet (2 mg) by mouth 3 times daily (Patient not taking: Reported on 8/1/2017), Disp: 40 tablet, Rfl: 1     DiphenhydrAMINE HCl (BENADRYL PO), Reported on 5/14/2017, Disp: , Rfl:      ASPIRIN PO, Take 325 mg by mouth as needed for moderate pain Reported on 4/5/2017, Disp: , Rfl:   Immunization History   Administered Date(s) Administered     Influenza (H1N1) 12/01/2009     Influenza (IIV3) 11/06/2004, 11/03/2006, 10/18/2007, 11/25/2008, 12/01/2009, 10/30/2012     Pneumococcal 23 valent 11/03/2006     TD (ADULT, 7+) 07/09/1997, 09/14/2005     TDAP Vaccine (Adacel) 06/22/2015     Allergies   Allergen Reactions     No Known Drug Allergies      Oxycodone Nausea and Vomiting         EXAM:   Constitutional:  Appears well-developed and well-nourished. No distress.   HEENT:   Head: Normocephalic.   Mouth/Throat: No oropharyngeal exudate present.   No cobblestoning of posterior oropharynx.   Nasal tissue pink and normal appearing.  No rhinorrhea noted.    Eyes: Conjunctivae are non-erythematous   Cardiovascular: Normal rate, regular rhythm and normal heart sounds. Exam reveals no gallop and no friction rub.   No  murmur heard.  Respiratory: Effort normal and breath sounds normal. No respiratory distress. No wheezes. No rales.   Musculoskeletal: Normal range of motion.   Lymphadenopathy:   No cervical adenopathy.   No lower extremity edema.   Neuro: Oriented to person, place, and time.  Skin: Erythema noted on bilateral arms with multiple areas of excoriation. No hives noted. Dry skin noted.   Psychiatric: Normal mood and affect.     Nursing note and vitals reviewed.      ASSESSMENT/PLAN:  Problem List Items Addressed This Visit        Respiratory    SOB (shortness of breath)     History of shortness of breath intermittently over the last few years. Occur 3-4 times yearly. Occurred once since last visit. She did not use albuterol. Associated with wheezing.Symptoms last 10-15 minutes.       Spirometry normal at last visit.     - Albuterol 2-4 puffs inhaled (use a spacer unless using a Proair Respiclick device) every 4 hours as needed for chest tightness, wheezing, shortness of breath and/or coughing.             Musculoskeletal and Integumentary    Pruritic disorder     History of diffuse itching over last few months. Improved mostly with cetirizine 10mg PO bid, but not completely. Can involve palms, feet, ankles, heads and chest.If itches at skin she will develop welts. No systemic symptoms.      Blood work evaluation including CBC, CMP, TSH, C3, C4, MOE, Uric acid and ESR. CRP elevated. History of blood transfusion.      - Cetirizine 20mg PO qam and 10mg PO qpm.  - If no improvement will increase to 20mg PO bid.   - If no improvement would send hepatitis serology and HIV.   - Associated dermatographism. 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   - Return to clinic in 6 months.          Dermatographism      Symptomatic.      - Cetirizine 20mg PO qam and 10mg PO qpm. If symptoms continue then increase to 20mg PO bid.          Polymorphic light eruption - Primary     Involving sun exposed skin. Erythematous and raised skin that  is mildly pruritic that occurs greater than 24 hours after sun exposure and will last for up to one week.    - Continue antihistamines as noted.  - Sunscreen when outdoors.  - Long shirts when outdoors.  - Avoid prolonged sun exposure.                Chart documentation with Dragon Voice recognition Software. Although reviewed after completion, some words and grammatical errors may remain.    Rell Huber DO   Allergy/Immunology  [facility], [place], [place]

## 2017-08-01 NOTE — MR AVS SNAPSHOT
After Visit Summary   8/1/2017    Tiesha Gurrola    MRN: [id number]           Patient Information     Date Of Birth          1950        Visit Information        Provider Department      8/1/2017 10:40 AM Rell Huber DO [facility]        Today's Diagnoses     Pruritic disorder          Care Instructions    Allergy Staff Appt Hours Shot Hours Locations    Physician     Rell Huber DO       Support Staff     FRANCES Nelson MA  Monday:                      [place] 8-7     Tuesday:         [place] 8-5 Wednesday:        [place]: 7-5 Friday:        [place] 7-5   [place]        Monday: 9-6 Friday: 7-2     [place]        Tuesday: 7-12 Thursday: 1-6 [place] Tuesday: 1-6 Wednesday: 11-6 Thursday: 7-12 [facility]  [address] [place] [address]  Appt Line: [phone]  Allergy RN (Monday):  [phone]    [facility]  [address] [place] [address]  Appt Line: [phone]  Allergy RN (Tues & Wed):  [phone]    [facility]  [address] [place] [address]  Appt Line: [phone]  Allergy RN (Friday):  [phone]       Important Scheduling Information  Aspirin Desensitization: Appt will last 2 clinic days. Please call the Allergy RN line for your clinic to schedule. Discontinue antihistamines 7 days prior to the appointment.     Food Challenges: Appt will last 3-4 hours. Please call the Allergy RN line for your clinic to schedule. Discontinue antihistamines 7 days prior to the appointment.     Penicillin Testing: Appt will last 2-3 hours. Please call the Allergy RN line for your clinic to schedule. Discontinue antihistamines 7 days prior to the appointment.     Skin Testing: Appt will about 40 minutes. Call the appointment line for your clinic to schedule. Discontinue antihistamines 7 days prior to the appointment.     Venom Testing: Appt will last 2-3  "hours. Please call the Allergy RN line for your clinic to schedule. Discontinue antihistamines 7 days prior to the appointment.     Thank you for trusting us with your Allergy, Asthma, and Immunology care. Please feel free to contact us with any questions or concerns you may have.      - Continue cetirizine daily. Increase the morning dose to 20mg by mouth daily in the morning and then 10mg by mouth in the evening.   - If continue to have itching increase to 20mg by mouth twice daily. If you do please call our office.   - Albuterol 2-4 puffs inhaled (use a spacer unless using a Proair Respiclick device) every 4 hours as needed for chest tightness, wheezing, shortness of breath and/or coughing.   - Wear sunscreen when outdoors. 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   - Return to clinic in 6 months.           Follow-ups after your visit        Follow-up notes from your care team     Return in about 6 months (around 2/1/2018).      Who to contact     If you have questions or need follow up information about today's clinic visit or your schedule please contact [facility] directly at [phone].  Normal or non-critical lab and imaging results will be communicated to you by Retellityhart, letter or phone within 4 business days after the clinic has received the results. If you do not hear from us within 7 days, please contact the clinic through Retellityhart or phone. If you have a critical or abnormal lab result, we will notify you by phone as soon as possible.  Submit refill requests through Crowdcare or call your pharmacy and they will forward the refill request to us. Please allow 3 business days for your refill to be completed.          Additional Information About Your Visit        Retellityhart Information     Crowdcare lets you send messages to your doctor, view your test results, renew your prescriptions, schedule appointments and more. To sign up, go to [email or web address] . Click on \"Log in\" on the left side of " "the screen, which will take you to the Welcome page. Then click on \"Sign up Now\" on the right side of the page.     You will be asked to enter the access code listed below, as well as some personal information. Please follow the directions to create your username and password.     Your access code is: [email or web address]  Expires: 2017 10:11 AM     Your access code will  in 90 days. If you need help or a new code, please call your [facility] clinic or [phone].        Care EveryWhere ID     This is your Care EveryWhere ID. This could be used by other organizations to access your [facility] medical records  [id number]        Your Vitals Were     Pulse Last Period Pulse Oximetry BMI (Body Mass Index)          66 2009 96% 37.85 kg/m2         Blood Pressure from Last 3 Encounters:   17 136/66   17 128/74   17 124/76    Weight from Last 3 Encounters:   17 219 lb (99.3 kg)   17 220 lb 9.6 oz (100.1 kg)   17 212 lb (96.2 kg)              Today, you had the following     No orders found for display       Primary Care Provider Office Phone # Fax #    Jessy MOE MD Joann [phone]       [facility] [address]  [facility] [address]        Equal Access to Services     [facility]: Hadii dominik palomo hadasho Sovero, waaxda luqadaha, qaybta kaalmada adeegyada, judith mejia . So [facility] [phone].    ATENCIÓN: Si habla español, tiene a mahoney disposición servicios gratuitos de asistencia lingüística. Vamshi hernandez [phone].    We comply with applicable federal civil rights laws and [place] laws. We do not discriminate on the basis of race, color, national origin, age, disability sex, sexual orientation or gender identity.            Thank you!     Thank you for choosing [facility]  for your care. Our goal is always to provide you with excellent care. Hearing back from our patients is one way we can continue " to improve our services. Please take a few minutes to complete the written survey that you may receive in the mail after your visit with us. Thank you!             Your Updated Medication List - Protect others around you: Learn how to safely use, store and throw away your medicines at www.disposemymeds.org.          This list is accurate as of: 8/1/17 11:08 AM.  Always use your most recent med list.                   Brand Name Dispense Instructions for use Diagnosis    ACE/ARB NOT PRESCRIBED (INTENTIONAL)      Please choose reason not prescribed, below    Major depressive disorder, recurrent episode, moderate (H)       albuterol 108 (90 BASE) MCG/ACT Inhaler    VENTOLIN HFA    1 Inhaler    Inhale 2 puffs into the lungs every 4 hours as needed for shortness of breath / dyspnea or wheezing    SOB (shortness of breath)       ASPIRIN PO      Take 325 mg by mouth as needed for moderate pain Reported on 4/5/2017        BENADRYL PO      Reported on 5/14/2017        cetirizine 10 MG tablet    zyrTEC    60 tablet    Take 1 tablet (10 mg) by mouth 2 times daily    Pruritic disorder       * escitalopram 20 MG tablet    LEXAPRO    135 tablet    Take 1.5 tablets (30 mg) by mouth daily    Major depressive disorder, recurrent episode, moderate (H)       * escitalopram 20 MG tablet    LEXAPRO    45 tablet    TAKE ONE AND ONE-HALF TABLETS BY MOUTH EVERY DAY **DUE FOR APPOINTMENT**    Major depressive disorder, recurrent episode, moderate (H)       flecainide acetate 150 MG Tabs     180 tablet    Take 1 tablet (150 mg) by mouth every 12 hours    Atrial fibrillation (H)       methylPREDNISolone 4 MG tablet    MEDROL DOSEPAK    21 tablet    Follow package instructions    Acute pain of left shoulder, Cervicalgia, Impingement syndrome of left shoulder       simvastatin 5 MG tablet    ZOCOR    90 tablet    Take 1 tablet (5 mg) by mouth daily    Hyperlipidemia       tiZANidine 2 MG tablet    ZANAFLEX    40 tablet    Take 1 tablet (2 mg)  by mouth 3 times daily    Acute pain of left shoulder, Impingement syndrome of left shoulder, Cervicalgia       triamcinolone 0.1 % cream    KENALOG    80 g    Mix entire tube of Kenalog (triamincinolone cream) with 16 oz. Of Cera-Ve or Cetaphil lotion, KEEP REFRIGERATED    Pruritus, Dermatitis       * Notice:  This list has 2 medication(s) that are the same as other medications prescribed for you. Read the directions carefully, and ask your doctor or other care provider to review them with you.

## 2017-08-17 ENCOUNTER — TELEPHONE (OUTPATIENT)
Dept: FAMILY MEDICINE | Facility: OTHER | Age: 67
End: 2017-08-17

## 2017-08-17 NOTE — TELEPHONE ENCOUNTER
Summary:    Patient is due/failing the following:   COLONOSCOPY    Action needed:   Schedule a colonoscopy     Type of outreach:    none per care everywhere UTD    Questions for provider review:    None                                                                                                                                    Ramandeep Rincon  Please abstract the following data from this visit with this patient into the appropriate field in Epic:    Colonoscopy done on this date: 02/05/2014 (approximately), by this group: Anais Queen, results in care everywhere.        Chart routed to abstraction .        Panel Management Review      Patient has the following on her problem list:     Depression / Dysthymia review  PHQ-9 SCORE 6/22/2016 3/17/2017 5/23/2017   Total Score - - -   Total Score MyChart - - 3 (Minimal depression)   Total Score 4 6 -      Patient is due for:  PHQ9        Composite cancer screening  Chart review shows that this patient is due/due soon for the following Colonoscopy

## 2017-10-26 DIAGNOSIS — I48.91 ATRIAL FIBRILLATION (H): ICD-10-CM

## 2017-10-26 RX ORDER — FLECAINIDE ACETATE 150 MG/1
1 TABLET ORAL EVERY 12 HOURS
Qty: 60 TABLET | Refills: 0 | Status: SHIPPED | OUTPATIENT
Start: 2017-10-26 | End: 2017-11-16

## 2017-11-16 ENCOUNTER — OFFICE VISIT (OUTPATIENT)
Dept: CARDIOLOGY | Facility: CLINIC | Age: 67
End: 2017-11-16
Attending: INTERNAL MEDICINE
Payer: COMMERCIAL

## 2017-11-16 VITALS
WEIGHT: 220 LBS | HEIGHT: 64 IN | SYSTOLIC BLOOD PRESSURE: 124 MMHG | HEART RATE: 60 BPM | BODY MASS INDEX: 37.56 KG/M2 | DIASTOLIC BLOOD PRESSURE: 74 MMHG

## 2017-11-16 DIAGNOSIS — I48.0 PAROXYSMAL A-FIB (H): ICD-10-CM

## 2017-11-16 DIAGNOSIS — I48.0 PAROXYSMAL ATRIAL FIBRILLATION (H): ICD-10-CM

## 2017-11-16 PROCEDURE — 99213 OFFICE O/P EST LOW 20 MIN: CPT | Performed by: INTERNAL MEDICINE

## 2017-11-16 PROCEDURE — 93000 ELECTROCARDIOGRAM COMPLETE: CPT | Performed by: INTERNAL MEDICINE

## 2017-11-16 RX ORDER — FLECAINIDE ACETATE 150 MG/1
1 TABLET ORAL EVERY 12 HOURS
Qty: 180 TABLET | Refills: 3 | Status: SHIPPED | OUTPATIENT
Start: 2017-11-16 | End: 2018-11-27

## 2017-11-16 NOTE — MR AVS SNAPSHOT
"              After Visit Summary   2017    Tiesha Gurrola    MRN: [id number]           Patient Information     Date Of Birth          1950        Visit Information        Provider Department      2017 1:15 PM Luis Vega MD [facility]        Today's Diagnoses     Paroxysmal a-fib (H)        Paroxysmal atrial fibrillation (H)           Follow-ups after your visit        Who to contact     If you have questions or need follow up information about today's clinic visit or your schedule please contact [facility] directly at [phone].  Normal or non-critical lab and imaging results will be communicated to you by Conelumhart, letter or phone within 4 business days after the clinic has received the results. If you do not hear from us within 7 days, please contact the clinic through Conelumhart or phone. If you have a critical or abnormal lab result, we will notify you by phone as soon as possible.  Submit refill requests through Makstr or call your pharmacy and they will forward the refill request to us. Please allow 3 business days for your refill to be completed.          Additional Information About Your Visit        MyChart Information     Makstr lets you send messages to your doctor, view your test results, renew your prescriptions, schedule appointments and more. To sign up, go to [email or web address] . Click on \"Log in\" on the left side of the screen, which will take you to the Welcome page. Then click on \"Sign up Now\" on the right side of the page.     You will be asked to enter the access code listed below, as well as some personal information. Please follow the directions to create your username and password.     Your access code is: [email or web address]  Expires: 2018  1:47 PM     Your access code will  in 90 days. If you need help or a new code, please call your [place] clinic or [phone].        Care " "EveryWhere ID     This is your Care EveryWhere ID. This could be used by other organizations to access your [place] medical records  [id number]        Your Vitals Were     Pulse Height Last Period BMI (Body Mass Index)          60 1.626 m (5' 4\") 05/01/2009 37.76 kg/m2         Blood Pressure from Last 3 Encounters:   11/16/17 124/74   08/01/17 136/66   06/09/17 128/74    Weight from Last 3 Encounters:   11/16/17 99.8 kg (220 lb)   08/01/17 99.3 kg (219 lb)   06/09/17 100.1 kg (220 lb 9.6 oz)              We Performed the Following     EKG 12-lead complete w/read - Clinics (performed today)     Follow-Up with Electrophysiologist          Where to get your medicines      These medications were sent to [id number] - [place], [place] - [address] [address]  [address] [address], [facility] [address]     Phone:  [phone]     flecainide acetate 150 MG Tabs          Primary Care Provider Office Phone # Fax #    Jessy David [phone]       [address]  [facility] [address]        Equal Access to Services     ADALID HIDALGO AH: Hadii dominik palomo hadasho Soomaali, waaxda luqadaha, qaybta kaalmada adeegyada, judith mejia . So [facility] [phone].    ATENCIÓN: Si habla español, tiene a mahoney disposición servicios gratuitos de asistencia lingüística. Llame al [phone].    We comply with applicable federal civil rights laws and [place] laws. We do not discriminate on the basis of race, color, national origin, age, disability, sex, sexual orientation, or gender identity.            Thank you!     Thank you for choosing [facility]  for your care. Our goal is always to provide you with excellent care. Hearing back from our patients is one way we can continue to improve our services. Please take a few minutes to complete the written survey that you may receive in the mail after your visit with us. Thank you!             Your Updated " Medication List - Protect others around you: Learn how to safely use, store and throw away your medicines at www.disposemymeds.org.          This list is accurate as of: 11/16/17  1:47 PM.  Always use your most recent med list.                   Brand Name Dispense Instructions for use Diagnosis    ACE/ARB/ARNI NOT PRESCRIBED (INTENTIONAL)      Please choose reason not prescribed, below    Major depressive disorder, recurrent episode, moderate (H)       albuterol 108 (90 BASE) MCG/ACT Inhaler    VENTOLIN HFA    1 Inhaler    Inhale 2 puffs into the lungs every 4 hours as needed for shortness of breath / dyspnea or wheezing    SOB (shortness of breath)       ASPIRIN PO      Take 325 mg by mouth as needed for moderate pain Reported on 4/5/2017        BENADRYL PO      Reported on 5/14/2017        cetirizine 10 MG tablet    zyrTEC    60 tablet    Take 1 tablet (10 mg) by mouth 2 times daily    Pruritic disorder       * escitalopram 20 MG tablet    LEXAPRO    135 tablet    Take 1.5 tablets (30 mg) by mouth daily    Major depressive disorder, recurrent episode, moderate (H)       * escitalopram 20 MG tablet    LEXAPRO    45 tablet    TAKE ONE AND ONE-HALF TABLETS BY MOUTH EVERY DAY **DUE FOR APPOINTMENT**    Major depressive disorder, recurrent episode, moderate (H)       flecainide acetate 150 MG Tabs     180 tablet    Take 1 tablet (150 mg) by mouth every 12 hours    Paroxysmal atrial fibrillation (H)       simvastatin 5 MG tablet    ZOCOR    90 tablet    Take 1 tablet (5 mg) by mouth daily    Hyperlipidemia       tiZANidine 2 MG tablet    ZANAFLEX    40 tablet    Take 1 tablet (2 mg) by mouth 3 times daily    Acute pain of left shoulder, Impingement syndrome of left shoulder, Cervicalgia       triamcinolone 0.1 % cream    KENALOG    80 g    Mix entire tube of Kenalog (triamincinolone cream) with 16 oz. Of Cera-Ve or Cetaphil lotion, KEEP REFRIGERATED    Pruritus, Dermatitis       * Notice:  This list has 2 medication(s)  that are the same as other medications prescribed for you. Read the directions carefully, and ask your doctor or other care provider to review them with you.

## 2017-11-16 NOTE — LETTER
11/16/2017      Jessy David MD  [address]  [facility] [address]      RE: Tieshatanja Meadeger       Dear Colleague,    I had the pleasure of seeing Tiesha Gurrola in the [facility].    HISTORY OF PRESENT ILLNESS:  Thank you for allowing me to participate in the care of your very delightful patient.  As you know, Tiesha is a 66-year-old patient with a longstanding history of symptomatic paroxysmal atrial fibrillation and was on flecainide with moderate success until recently when she had more episodes.  In light of that, I recommended a catheter-based ablation, which was performed in October of last year.  She underwent successful pulmonary vein isolation along with empiric ablation of CTI.  I last saw her a year ago.      Over this past year, she was doing quite well for the most part until this past September when she had several episodes of short-lived palpitations consistent with her previous episode of atrial fibrillation.  Since then she has not had any more.  She has been on the same dose of flecainide at 150 mg twice a day.  She has been on aspirin only, given her CHADS-VASc score of 1, namely from being a female.  Today, EKG demonstrates sinus rhythm.      I asked her to titrate down the flecainide at her own schedule.  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  I would like to see her back in a year's time, sooner should the circumstances dictate it.        Outpatient Encounter Prescriptions as of 11/16/2017   Medication Sig Dispense Refill     flecainide acetate 150 MG TABS Take 1 tablet (150 mg) by mouth every 12 hours 180 tablet 3     escitalopram (LEXAPRO) 20 MG tablet TAKE ONE AND ONE-HALF TABLETS BY MOUTH EVERY DAY **DUE FOR APPOINTMENT** 45 tablet 5     tiZANidine (ZANAFLEX) 2 MG tablet Take 1 tablet (2 mg) by mouth 3 times daily  40 tablet 1     escitalopram (LEXAPRO) 20 MG tablet Take 1.5 tablets (30 mg) by mouth daily 135 tablet 1     ACE/ARB NOT PRESCRIBED, INTENTIONAL, Please choose reason not prescribed, below       simvastatin (ZOCOR) 5 MG tablet Take 1 tablet (5 mg) by mouth daily 90 tablet 3     DiphenhydrAMINE HCl (BENADRYL PO) Reported on 5/14/2017       cetirizine (ZYRTEC) 10 MG tablet Take 1 tablet (10 mg) by mouth 2 times daily 60 tablet 11     albuterol (VENTOLIN HFA) 108 (90 BASE) MCG/ACT Inhaler Inhale 2 puffs into the lungs every 4 hours as needed for shortness of breath / dyspnea or wheezing 1 Inhaler 1     triamcinolone (KENALOG) 0.1 % cream Mix entire tube of Kenalog (triamincinolone cream) with 16 oz. Of Cera-Ve or Cetaphil lotion, KEEP REFRIGERATED 80 g 0     ASPIRIN PO Take 325 mg by mouth as needed for moderate pain Reported on 4/5/2017       [DISCONTINUED] flecainide acetate 150 MG TABS Take 1 tablet (150 mg) by mouth every 12 hours 60 tablet 0     [DISCONTINUED] methylPREDNISolone (MEDROL DOSEPAK) 4 MG tablet Follow package instructions 21 tablet 0     No facility-administered encounter medications on file as of 11/16/2017.        Again, thank you for allowing me to participate in the care of your patient.      Sincerely,    Luis Aguilar MD     [facility]

## 2017-11-16 NOTE — PROGRESS NOTES
HPI and Plan:   See dictation  [id number]  Orders Placed This Encounter   Procedures     Follow-Up with Electrophysiologist     EKG 12-lead complete w/read - Clinics (performed today)       Orders Placed This Encounter   Medications     flecainide acetate 150 MG TABS     Sig: Take 1 tablet (150 mg) by mouth every 12 hours     Dispense:  180 tablet     Refill:  3       Medications Discontinued During This Encounter   Medication Reason     methylPREDNISolone (MEDROL DOSEPAK) 4 MG tablet Stopped by Patient     flecainide acetate 150 MG TABS Reorder         Encounter Diagnoses   Name Primary?     Paroxysmal a-fib (H)      Paroxysmal atrial fibrillation (H)        CURRENT MEDICATIONS:  Current Outpatient Prescriptions   Medication Sig Dispense Refill     flecainide acetate 150 MG TABS Take 1 tablet (150 mg) by mouth every 12 hours 180 tablet 3     escitalopram (LEXAPRO) 20 MG tablet TAKE ONE AND ONE-HALF TABLETS BY MOUTH EVERY DAY **DUE FOR APPOINTMENT** 45 tablet 5     tiZANidine (ZANAFLEX) 2 MG tablet Take 1 tablet (2 mg) by mouth 3 times daily 40 tablet 1     escitalopram (LEXAPRO) 20 MG tablet Take 1.5 tablets (30 mg) by mouth daily 135 tablet 1     ACE/ARB NOT PRESCRIBED, INTENTIONAL, Please choose reason not prescribed, below       simvastatin (ZOCOR) 5 MG tablet Take 1 tablet (5 mg) by mouth daily 90 tablet 3     DiphenhydrAMINE HCl (BENADRYL PO) Reported on 5/14/2017       cetirizine (ZYRTEC) 10 MG tablet Take 1 tablet (10 mg) by mouth 2 times daily 60 tablet 11     albuterol (VENTOLIN HFA) 108 (90 BASE) MCG/ACT Inhaler Inhale 2 puffs into the lungs every 4 hours as needed for shortness of breath / dyspnea or wheezing 1 Inhaler 1     triamcinolone (KENALOG) 0.1 % cream Mix entire tube of Kenalog (triamincinolone cream) with 16 oz. Of Cera-Ve or Cetaphil lotion, KEEP REFRIGERATED 80 g 0     ASPIRIN PO Take 325 mg by mouth as needed for moderate pain Reported on 4/5/2017       [DISCONTINUED] flecainide acetate 150 MG  TABS Take 1 tablet (150 mg) by mouth every 12 hours 60 tablet 0       ALLERGIES     Allergies   Allergen Reactions     No Known Drug Allergies      Oxycodone Nausea and Vomiting       PAST MEDICAL HISTORY:  Past Medical History:   Diagnosis Date     Adhesive capsulitis      Adhesive capsulitis of shoulder 7/30/2013     History of blood transfusion      Hyperlipemia      Mitral valve disorder      Mitral valve disorders(424.0)     Hx of mitral valve prolapse     OA (osteoarthritis) of hip      Osteoarthritis of acromioclavicular joint 10/17/2012     Paroxysmal atrial fibrillation (H) 7/2008     Paroxysmal supraventricular tachycardia (H)      Rotator cuff tear 10/17/2012     Tobacco abuse        PAST SURGICAL HISTORY:  Past Surgical History:   Procedure Laterality Date     C LAP,UTERUS,UNLISTED PROCEDURE  08/27/2007    Lyis of adhesions of the uterus to the abdominal wall.     C TREAT ECTOPIC PREG,ABD PREG  1974    about 3 mos.     CANALPLASTY Right 6/23/2016    Procedure: CANALPLASTY;  Surgeon: Rishabh Boudreaux MD;  Location: [facility] OR     COLONOSCOPY  07/11/07     DILATION AND CURETTAGE  2/18/16    HD&C     DILATION AND CURETTAGE, OPERATIVE HYSTEROSCOPY, COMBINED N/A 2/18/2016    Procedure: COMBINED DILATION AND CURETTAGE, OPERATIVE HYSTEROSCOPY;  Surgeon: Keshia Chang DO;  Location: [facility] OR     GRAFT THIERSCH STATUS POST TYMPANOMASTOIDECTOMY Right 6/30/2016    Procedure: GRAFT THIERSCH STATUS POST TYMPANOMASTOIDECTOMY;  Surgeon: Rishabh Boudreaux MD;  Location: [facility] OR     H ABLATION FOCAL AFIB  10/12/16     HC LAPAROSCOPY, SURGICAL; APPENDECTOMY  08/27/2007     JOINT REPLACEMENT, HIP RT/LT Right 8/12/14    Joint Replacement Hip RT/LT     MEATOPLASTY EAR Right 6/23/2016    Procedure: MEATOPLASTY EAR;  Surgeon: Rishabh Boudreaux MD;  Location: [facility] OR     MYRINGOTOMY Right 4/26/2016    Procedure: MYRINGOTOMY;  Surgeon: Jake Solorzano MD;  Location: [facility] OR     SHOULDER SURGERY Left 1/7/15    aiden lou  arthroplasty, rotator cuff     TYMPANOMASTOIDECTOMY Right 6/23/2016    Procedure: TYMPANOMASTOIDECTOMY;  Surgeon: Rishabh Boudreaux MD;  [facility]     TYMPANOMASTOIDECTOMY WITH FACIAL MONITORING  12/13/2011    Procedure:TYMPANOMASTOIDECTOMY WITH FACIAL MONITORING; right tympanoplasty, mastoidectomy with facial nerve monitoring; Surgeon:EVI LLAMAS; Location:[facility] OR       FAMILY HISTORY:  Family History   Problem Relation Age of Onset     Allergies Son      Hayfever     HEART DISEASE Son      Paroxysmal tach.     Arthritis Mother      Depression Mother      depression and anxiety     Respiratory Mother      Asthma     Arthritis Father      HEART DISEASE Father      Lipids Father      Arrhythmia Father      Pacemaker Father      Heart Surgery Father      bypass x3     CANCER Maternal Grandmother      Breast CA     CANCER Paternal Grandmother      ?? pancreatic CA     OSTEOPOROSIS Paternal Grandmother      Neurologic Disorder Daughter      ?? epilepsy     Obesity Daughter      Family History Negative Brother      Family History Negative Son      Family History Negative Brother      DIABETES Other      Maternal cousin --- [email or web address]     EYE* Other      Niece-- lazy eye     HEART DISEASE Paternal Uncle      death at 56/bypass surgery     HEART DISEASE Paternal Aunt        SOCIAL HISTORY:  Social History     Social History     Marital status:      Spouse name: N/A     Number of children: 3     Years of education: N/A     Occupational History                Social History Main Topics     Smoking status: Current Every Day Smoker     Packs/day: 0.25     Years: 32.00     Types: Cigarettes     Smokeless tobacco: Never Used      Comment: 7 cigs a day     Alcohol use No     Drug use: No     Sexual activity: Yes     Partners: Male      Comment: no b/c     Other Topics Concern     Parent/Sibling W/ Cabg, Mi Or Angioplasty Before 65f 55m? No     Caffeine Concern No     Occupational Exposure No      "Sleep Concern No     Stress Concern No     Weight Concern No     Exercise No     Seat Belt Yes     Social History Narrative       Review of Systems:  Skin:  Negative       Eyes:  Positive for glasses    ENT:  Negative      Respiratory:  Negative       Cardiovascular:  Negative for;palpitations;chest pain;edema;lightheadedness;dizziness      Gastroenterology: Negative for melena;hematochezia    Genitourinary:  Negative      Musculoskeletal:  Positive for joint pain;neck pain  (right shoulder surgery 6/24/15)  Neurologic:  Negative      Psychiatric:  Negative      Heme/Lymph/Imm:  Negative      Endocrine:  Negative        Physical Exam:  Vitals: /74  Pulse 60  Ht 1.626 m (5' 4\")  Wt 99.8 kg (220 lb)  LMP 05/01/2009  BMI 37.76 kg/m2    Constitutional:  cooperative, alert and oriented, well developed, well nourished, in no acute distress        Skin:  warm and dry to the touch, no apparent skin lesions or masses noted          Head:  normocephalic, no masses or lesions        Eyes:  pupils equal and round, conjunctivae and lids unremarkable, sclera white, no xanthalasma, EOMS intact, no nystagmus        Lymph:No Cervical lymphadenopathy present     ENT:           Neck:  carotid pulses are full and equal bilaterally, JVP normal, no carotid bruit        Respiratory:  normal breath sounds, clear to auscultation, normal A-P diameter, normal symmetry, normal respiratory excursion, no use of accessory muscles         Cardiac: regular rhythm, normal S1/S2, no S3 or S4, apical impulse not displaced, no murmurs, gallops or rubs                                                         GI:  abdomen soft, non-tender, BS normoactive, no mass, no HSM, no bruits        Extremities and Muscular Skeletal:  no deformities, clubbing, cyanosis, erythema observed              Neurological:  no gross motor deficits        Psych:  Alert and Oriented x 3        CC  Jessy David MD  [address]  [place] " [address]

## 2017-11-17 NOTE — PROGRESS NOTES
HISTORY OF PRESENT ILLNESS:  Thank you for allowing me to participate in the care of your very delightful patient.  As you know, Deshawn is a 66-year-old patient with a longstanding history of symptomatic paroxysmal atrial fibrillation and was on flecainide with moderate success until recently when she had more episodes.  In light of that, I recommended a catheter-based ablation, which was performed in October of last year.  She underwent successful pulmonary vein isolation along with empiric ablation of CTI.  I last saw her a year ago.      Over this past year, she was doing quite well for the most part until this past September when she had several episodes of short-lived palpitations consistent with her previous episode of atrial fibrillation.  Since then she has not had any more.  She has been on the same dose of flecainide at 150 mg twice a day.  She has been on aspirin only, given her CHADS-VASc score of 1, namely from being a female.  Today, EKG demonstrates sinus rhythm.      I asked her to titrate down the flecainide at her own schedule.  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  I would like to see her back in a year's time, sooner should the circumstances dictate it.      cc:   Jessy David MD    [address], [facility] [address]    [place] [address]         RICHA VERONICA MD             D: 2017 16:08   T: 2017 22:39   MT: NIA      Name:     DESHAWN ROSEN   MRN:      [id number]        Account:      [id number]   :      1950           Service Date: 2017      Document: [id number]

## 2018-04-09 DIAGNOSIS — E78.5 HYPERLIPIDEMIA: ICD-10-CM

## 2018-04-09 NOTE — LETTER
[address]  [facility] [address]  Phone: [phone]  April 12, 2018      Tiesha Gurrola  [address] [address]      Dear Tiesha,    We care about your health and have reviewed your health plan including your medical conditions, medications, and lab results.  Based on this review, it is recommended that you follow up regarding the following health topic(s):  -Cholesterol  -Depression    We recommend you take the following action(s):  -schedule a FOLLOWUP OFFICE APPOINTMENT.  We will perform the following labs:  Lipids (fasting cholesterol - nothing to eat except water and/or meds for 8-10 hours) and BMP (basic metabolic panel).     Please call us at the [facility] - [phone] (or use LaunchCyte) to address the above recommendations.     Thank you for trusting [facility] and we appreciate the opportunity to serve you.  We look forward to supporting your healthcare needs in the future.    Healthy Regards,    Your Health Care Team  [facility]

## 2018-04-10 RX ORDER — SIMVASTATIN 5 MG
TABLET ORAL
Qty: 90 TABLET | Refills: 0 | Status: SHIPPED | OUTPATIENT
Start: 2018-04-10 | End: 2018-06-01

## 2018-04-10 NOTE — TELEPHONE ENCOUNTER
Simvastatin    Medication is being filled for 1 time refill only due to:  Patient needs labs Fasting. Patient needs to be seen because depression F/U.     Please help schedule OV.    Daysi Alcaraz, RN, BSN

## 2018-05-17 ENCOUNTER — TELEPHONE (OUTPATIENT)
Dept: FAMILY MEDICINE | Facility: OTHER | Age: 68
End: 2018-05-17

## 2018-05-17 NOTE — TELEPHONE ENCOUNTER
Summary:    Patient is due/failing the following:   MAMMOGRAM and PHYSICAL with fasting labs    Action needed:   Patient needs office visit for Physical . and schedule a mammogram     Type of outreach:    Phone, left message for patient to call back.     Questions for provider review:    None                                                                                                                                    Ramandeep Rincon       Chart routed to Care Team .  Panel Management Review      Patient has the following on her problem list:     Depression / Dysthymia review    Measure:  Needs PHQ-9 score of 4 or less during index window.  Administer PHQ-9 and if score is 5 or more, send encounter to provider for next steps.        PHQ-9 SCORE 6/22/2016 3/17/2017 5/23/2017   Total Score - - -   Total Score MyChart - - 3 (Minimal depression)   Total Score 4 6 -       If PHQ-9 recheck is 5 or more, route to provider for next steps.    Patient is due for:  None      Composite cancer screening  Chart review shows that this patient is due/due soon for the following Mammogram

## 2018-05-31 ENCOUNTER — RADIANT APPOINTMENT (OUTPATIENT)
Dept: MAMMOGRAPHY | Facility: OTHER | Age: 68
End: 2018-05-31
Attending: FAMILY MEDICINE
Payer: COMMERCIAL

## 2018-05-31 DIAGNOSIS — R92.8 ABNORMAL MAMMOGRAM: Primary | ICD-10-CM

## 2018-05-31 DIAGNOSIS — Z12.31 VISIT FOR SCREENING MAMMOGRAM: ICD-10-CM

## 2018-05-31 PROCEDURE — 77067 SCR MAMMO BI INCL CAD: CPT | Mod: TC

## 2018-05-31 NOTE — PROGRESS NOTES
SUBJECTIVE:   Tiesha Gurrola is a 67 year old female who presents for Preventive Visit.    Are you in the first 12 months of your Medicare coverage?  Yes,  Visual Acuity: just had eyes examined at [facility] about 5-6 months ago    Physical   Annual:     Getting at least 3 servings of Calcium per day::  Yes    Bi-annual eye exam::  Yes    Dental care twice a year::  Yes    Sleep apnea or symptoms of sleep apnea::  Excessive snoring and Sleep apnea    Diet::  Regular (no restrictions)    Frequency of exercise::  2-3 days/week    Duration of exercise::  30-45 minutes    Taking medications regularly::  Yes    Additional concerns today::  YES (swelling in feet and ankles for 1 week)    Ability to successfully perform activities of daily living: telephone requires assistance and no assistance needed  Home Safety:  Lack of grab bars in the bathroom  Hearing Impairment: difficulty following a conversation in a noisy restaurant or crowded room, feel that people are mumbling or not speaking clearly, difficulty following dialogue in the theater, difficult to understand a speaker at a public meeting or [employer] service, need to ask people to speak up or repeat themselves, difficulty understanding soft or whispered speech and difficulty understanding speech on the telephone        Fall risk:  Fallen 2 or more times in the past year?: No  Any fall with injury in the past year?: Yes    COGNITIVE SCREEN  1) Repeat 3 items (Banana, Sunrise, Chair)    2) Clock draw: NORMAL  3) 3 item recall: Recalls 2 objects   Results: NORMAL clock, 1-2 items recalled: COGNITIVE IMPAIRMENT LESS LIKELY    Mini-CogTM John Varela. Licensed by the author for use in [facility]; reprinted with permission ([email or web address]@.[email or web address]). All rights reserved.        Reviewed and updated as needed this visit by clinical staff  Tobacco  Allergies  Meds  Problems  Med Hx  Surg Hx  Fam Hx  Soc Hx          Reviewed and updated as needed  this visit by Provider  Allergies  Meds  Problems        Social History   Substance Use Topics     Smoking status: Current Every Day Smoker     Packs/day: 0.25     Years: 32.00     Types: Cigarettes     Smokeless tobacco: Never Used      Comment: 7 cigs a day     Alcohol use No       Alcohol Use 6/1/2018   If you drink alcohol do you typically have greater than 3 drinks per day OR greater than 7 drinks per week? Not Applicable   No flowsheet data found.    Answers for HPI/ROS submitted by the patient on 6/1/2018   PHQ-2 Score: 1    Today's PHQ-2 Score:   PHQ-2 ( 1999 [employer]) 6/1/2018   Q1: Little interest or pleasure in doing things 1   Q2: Feeling down, depressed or hopeless 0   PHQ-2 Score 1   Q1: Little interest or pleasure in doing things Several days   Q2: Feeling down, depressed or hopeless Not at all   PHQ-2 Score 1       Do you feel safe in your environment - Yes    Do you have a Health Care Directive?: No: Advance care planning was reviewed with patient; patient declined at this time.    Current providers sharing in care for this patient include:   Patient Care Team:  Jessy David MD as PCP - General    The following health maintenance items are reviewed in Epic and correct as of today:  Health Maintenance   Topic Date Due     HEPATITIS C SCREENING  11/25/1970     ADVANCE DIRECTIVE PLANNING Q5 YRS  07/06/2016     DEPRESSION ACTION PLAN Q1 YR  06/22/2017     PHQ-9 Q6 MONTHS  09/16/2017     FALL RISK ASSESSMENT  11/25/2017     DEXA SCAN SCREENING (SYSTEM ASSIGNED)  11/25/2017     PNEUMOCOCCAL (1 of 2 - PCV13) 11/25/2017     MAMMO SCREEN Q2 YR (SYSTEM ASSIGNED)  01/18/2018     ALT Q1 YR  03/10/2018     TOBACCO CESSATION COUNSELING Q1 YR  03/16/2018     LIPID MONITORING Q1 YEAR  04/06/2018     INFLUENZA VACCINE (Season Ended) 09/01/2018     PAP Q3 YR  01/12/2019     COLONOSCOPY Q10 YR  02/05/2024     TETANUS IMMUNIZATION (SYSTEM ASSIGNED)  06/22/2025     HIV SCREEN (SYSTEM ASSIGNED)  Completed  "      Review of Systems   Constitutional: Positive for chills. Negative for fever.   HENT: Positive for ear pain and hearing loss (Wears hearing aids). Negative for sore throat.    Eyes: Negative for pain and visual disturbance.   Respiratory: Positive for shortness of breath. Negative for cough.    Cardiovascular: Positive for peripheral edema (Worse in the warm weather.  Worse if feet are down.). Negative for chest pain and palpitations.        Cardiology had her try to decrease her flecanide, but had more palpitations, so went back to a full tablet   Gastrointestinal: Positive for constipation (Working on it through diet.  Has seen GI and had testing done and can't feel when to have bowel movement). Negative for abdominal pain, diarrhea, heartburn, hematochezia and nausea.   Genitourinary: Positive for frequency and urgency. Negative for dysuria, genital sores, hematuria, pelvic pain, vaginal bleeding and vaginal discharge.        Leaks with coughing/sneezing, also with urgency.   Musculoskeletal: Positive for arthralgias (Might be having hip surgery) and myalgias.   Skin: Positive for rash.        Saw Dermatology, put on Zyrtec.  Also hit her left leg and found a lump at the area, it moves and is slowly improving.   Neurological: Positive for paresthesias (Bilateral hands, wakes her up at night, burning numbness.). Negative for dizziness, weakness and headaches.   Psychiatric/Behavioral: Negative for mood changes. The patient is nervous/anxious (Stopped the Lexapro 2 weeks ago, doesn't feel depressed, but cries easily with things she watches on TV.).        OBJECTIVE:   /60  Pulse 77  Temp 97.5  F (36.4  C) (Temporal)  Ht 5' 4\" (1.626 m)  Wt 228 lb (103.4 kg)  LMP 05/01/2009  SpO2 96%  Breastfeeding? No  BMI 39.14 kg/m2 Estimated body mass index is 39.14 kg/(m^2) as calculated from the following:    Height as of this encounter: 5' 4\" (1.626 m).    Weight as of this encounter: 228 lb (103.4 " kg).  Physical Exam   Constitutional: She is oriented to person, place, and time. She appears well-developed and well-nourished. No distress.   HENT:   Right Ear: Tympanic membrane and external ear normal. Decreased hearing is noted.   Left Ear: Tympanic membrane and external ear normal. Decreased hearing is noted.   Nose: Nose normal.   Mouth/Throat: Oropharynx is clear and moist. No oropharyngeal exudate.   Eyes: Conjunctivae are normal. Pupils are equal, round, and reactive to light. Right eye exhibits no discharge. Left eye exhibits no discharge.   Neck: Neck supple. No tracheal deviation present. No thyromegaly present.   Cardiovascular: Normal rate, regular rhythm, S1 normal, S2 normal, normal heart sounds and normal pulses.  Exam reveals no S3, no S4 and no friction rub.    No murmur heard.  Pulmonary/Chest: Effort normal and breath sounds normal. No respiratory distress. She has no wheezes. She has no rales.   Abdominal: Soft. Bowel sounds are normal. She exhibits no mass. There is no hepatosplenomegaly. There is no tenderness.   Musculoskeletal: Normal range of motion. She exhibits no edema.   Lymphadenopathy:     She has no cervical adenopathy.   Neurological: She is alert and oriented to person, place, and time. She has normal strength and normal reflexes. She exhibits normal muscle tone.   Skin: Skin is warm and dry. No rash noted.   Psychiatric: She has a normal mood and affect. Judgment and thought content normal. Cognition and memory are normal.       ASSESSMENT / PLAN:   1. Encounter for routine adult health examination without abnormal findings    2. Asymptomatic postmenopausal status  She is agreeable to this screening as she feels she has osteoporosis.  - DEXA HIP/PELVIS/SPINE - Future; Future    3. Need for hepatitis C screening test  Agreeable to screening  - Hepatitis C Screen Reflex to HCV RNA Quant and Genotype; Future    4. Need for prophylactic vaccination against Streptococcus pneumoniae  (pneumococcus)    - Pneumococcal vaccine 13 valent PCV13 IM (Prevnar) [60436]  - ADMIN: Vaccine, Initial (79664)    5. Morbid obesity (H)  Discussed healthy diet and regular exercise    6. Hyperlipidemia, unspecified hyperlipidemia type  Will return for future labs.  In the meantime we will continue on low-dose statin    - Lipid panel reflex to direct LDL Fasting; Future  - Comprehensive metabolic panel; Future  - simvastatin (ZOCOR) 5 MG tablet; Take 1 tablet (5 mg) by mouth daily  Dispense: 90 tablet; Refill: 3  - TSH with free T4 reflex; Future    7. Paroxysmal atrial fibrillation (H)  She follows with cardiology.  Recommended that she discuss with cardiology her flecainide dosing.    - TSH with free T4 reflex; Future    8. Major depressive disorder, recurrent episode, moderate (H)  She feels her symptoms have worsened after being off the Lexapro.  Will restart Lexapro but only at 10 mg.    - escitalopram (LEXAPRO) 10 MG tablet; Take 1 tablet (10 mg) by mouth daily  Dispense: 90 tablet; Refill: 3  - TSH with free T4 reflex; Future    9. Bilateral hearing loss, unspecified hearing loss type  She has hearing aids at home.    10. Personal history of tobacco use  She is agreeable to lung cancer screening.  She would also like to try nicotine gum.    - Prof Fee: Shared Decision Making Visit for Lung Cancer Screening  - nicotine polacrilex (NICORETTE) 4 MG gum; Place 1 each (4 mg) inside cheek as needed for smoking cessation  Dispense: 50 tablet; Refill: 3    11. Urinary incontinence, unspecified type  She would like to return to urology to discuss further management.  She is seeing Dr. Sexton in the past.    - UROLOGY ADULT REFERRAL    12. Bilateral carpal tunnel syndrome  She complains of bilateral numbness and tingling in both hands when she wakes up at night.  This sounds consistent with carpal tunnel syndrome.  She is already following with orthopedics and I recommended that she return for further  "evaluation.    - ORTHO  REFERRAL    13. RENU (obstructive sleep apnea)  She tells me that after having a surgery and she was told that it was unanimous from all the staff that she needs to have a sleep study as she stopped breathing several times overnight.    - SLEEP EVALUATION & MANAGEMENT REFERRAL - ADULT -[facility] - [place] [phone] (Age 15 if over 100 lbs); Future    End of Life Planning:  Patient currently has an advanced directive: No.  I have verified the patient's ablity to prepare an advanced directive/make health care decisions.  Literature was provided to assist patient in preparing an advanced directive.    COUNSELING:  Reviewed preventive health counseling, as reflected in patient instructions    BP Screening:   Last 3 BP Readings:    BP Readings from Last 3 Encounters:   06/01/18 122/60   11/16/17 124/74   08/01/17 136/66       The following was recommended to the patient:  Re-screen BP within a year and recommended lifestyle modifications    Estimated body mass index is 39.14 kg/(m^2) as calculated from the following:    Height as of this encounter: 5' 4\" (1.626 m).    Weight as of this encounter: 228 lb (103.4 kg).  Weight management plan: Discussed healthy diet and exercise guidelines and patient will follow up in 12 months in clinic to re-evaluate.   reports that she has been smoking Cigarettes.  She has a 8.00 pack-year smoking history. She has never used smokeless tobacco.  Tobacco Cessation Action Plan: Pharmacotherapies : Nicotine gum    Appropriate preventive services were discussed with this patient, including applicable screening as appropriate for cardiovascular disease, diabetes, osteopenia/osteoporosis, and glaucoma.  As appropriate for age/gender, discussed screening for colorectal cancer, prostate cancer, breast cancer, and cervical cancer. Checklist reviewing preventive services available has been given to the patient.    Reviewed patients plan of care and " provided an AVS. The Basic Care Plan (routine screening as documented in Health Maintenance) for Tiesha meets the Care Plan requirement. This Care Plan has been established and reviewed with the Patient.    Counseling Resources:  ATP IV Guidelines  Pooled Cohorts Equation Calculator  Breast Cancer Risk Calculator  FRAX Risk Assessment  ICSI Preventive Guidelines  Dietary Guidelines for Americans, 2010  [employer]'s MyPlate  ASA Prophylaxis  Lung CA Screening    Jessy David MD  [facility]    Lung Cancer Screening Shared Decision Making Visit     Tiesha Gurrola is not eligible for lung cancer screening on the basis of her smoking history:  History   Smoking Status     Current Every Day Smoker     Packs/day: 0.25     Years: 32.00     Types: Cigarettes   Smokeless Tobacco     Never Used     Comment: 7 cigs a day       I have not discussed with patient the risks and benefits of screening for lung cancer with low-dose CT.         The benefit of early detection of lung cancer is contingent upon adherence to annual screening or more frequent follow up if indicated.     Furthermore, reaping the benefits of screening requires Tiesha Gurrola to be willing and physically able to undergo diagnostic procedures, if indicated. Although no specific guide is available for determining severity of comorbidities, it is reasonable to withhold screening in patients who have greater mortality risk from other diseases.     We did not discuss that the only way to prevent lung cancer is to not smoke. Smoking cessation assistance was offered.    I did not offer risk estimation using a calculator such as this one:    ShouldIScreen

## 2018-05-31 NOTE — PATIENT INSTRUCTIONS

## 2018-06-01 ENCOUNTER — TELEPHONE (OUTPATIENT)
Dept: FAMILY MEDICINE | Facility: OTHER | Age: 68
End: 2018-06-01

## 2018-06-01 ENCOUNTER — OFFICE VISIT (OUTPATIENT)
Dept: FAMILY MEDICINE | Facility: OTHER | Age: 68
End: 2018-06-01
Payer: COMMERCIAL

## 2018-06-01 VITALS
HEIGHT: 64 IN | TEMPERATURE: 97.5 F | BODY MASS INDEX: 38.93 KG/M2 | HEART RATE: 77 BPM | OXYGEN SATURATION: 96 % | DIASTOLIC BLOOD PRESSURE: 60 MMHG | SYSTOLIC BLOOD PRESSURE: 122 MMHG | WEIGHT: 228 LBS

## 2018-06-01 DIAGNOSIS — Z23 NEED FOR PROPHYLACTIC VACCINATION AGAINST STREPTOCOCCUS PNEUMONIAE (PNEUMOCOCCUS): ICD-10-CM

## 2018-06-01 DIAGNOSIS — F33.1 MAJOR DEPRESSIVE DISORDER, RECURRENT EPISODE, MODERATE (H): ICD-10-CM

## 2018-06-01 DIAGNOSIS — I48.0 PAROXYSMAL ATRIAL FIBRILLATION (H): ICD-10-CM

## 2018-06-01 DIAGNOSIS — G56.03 BILATERAL CARPAL TUNNEL SYNDROME: ICD-10-CM

## 2018-06-01 DIAGNOSIS — Z78.0 ASYMPTOMATIC POSTMENOPAUSAL STATUS: ICD-10-CM

## 2018-06-01 DIAGNOSIS — E78.5 HYPERLIPIDEMIA, UNSPECIFIED HYPERLIPIDEMIA TYPE: ICD-10-CM

## 2018-06-01 DIAGNOSIS — G47.33 OSA (OBSTRUCTIVE SLEEP APNEA): ICD-10-CM

## 2018-06-01 DIAGNOSIS — Z00.00 ENCOUNTER FOR ROUTINE ADULT HEALTH EXAMINATION WITHOUT ABNORMAL FINDINGS: Primary | ICD-10-CM

## 2018-06-01 DIAGNOSIS — Z11.59 NEED FOR HEPATITIS C SCREENING TEST: ICD-10-CM

## 2018-06-01 DIAGNOSIS — R32 URINARY INCONTINENCE, UNSPECIFIED TYPE: ICD-10-CM

## 2018-06-01 DIAGNOSIS — Z87.891 PERSONAL HISTORY OF TOBACCO USE: ICD-10-CM

## 2018-06-01 DIAGNOSIS — E66.01 MORBID OBESITY (H): ICD-10-CM

## 2018-06-01 DIAGNOSIS — H91.93 BILATERAL HEARING LOSS, UNSPECIFIED HEARING LOSS TYPE: ICD-10-CM

## 2018-06-01 PROCEDURE — 90670 PCV13 VACCINE IM: CPT | Performed by: FAMILY MEDICINE

## 2018-06-01 PROCEDURE — G0296 VISIT TO DETERM LDCT ELIG: HCPCS | Performed by: FAMILY MEDICINE

## 2018-06-01 PROCEDURE — G0009 ADMIN PNEUMOCOCCAL VACCINE: HCPCS | Performed by: FAMILY MEDICINE

## 2018-06-01 PROCEDURE — G0402 INITIAL PREVENTIVE EXAM: HCPCS | Performed by: FAMILY MEDICINE

## 2018-06-01 PROCEDURE — 99213 OFFICE O/P EST LOW 20 MIN: CPT | Mod: 25 | Performed by: FAMILY MEDICINE

## 2018-06-01 RX ORDER — ESCITALOPRAM OXALATE 10 MG/1
10 TABLET ORAL DAILY
Qty: 90 TABLET | Refills: 3 | Status: SHIPPED | OUTPATIENT
Start: 2018-06-01 | End: 2019-07-12

## 2018-06-01 RX ORDER — SIMVASTATIN 5 MG
5 TABLET ORAL DAILY
Qty: 90 TABLET | Refills: 3 | Status: SHIPPED | OUTPATIENT
Start: 2018-06-01 | End: 2019-07-12

## 2018-06-01 ASSESSMENT — ENCOUNTER SYMPTOMS
DIARRHEA: 0
CONSTIPATION: 1
WEAKNESS: 0
PALPITATIONS: 0
MYALGIAS: 1
HEADACHES: 0
HEMATOCHEZIA: 0
DYSURIA: 0
HEMATURIA: 0
PARESTHESIAS: 1
NAUSEA: 0
FEVER: 0
NERVOUS/ANXIOUS: 1
DIZZINESS: 0
COUGH: 0
ARTHRALGIAS: 1
HEARTBURN: 0
SORE THROAT: 0
EYE PAIN: 0
ABDOMINAL PAIN: 0
FREQUENCY: 1
CHILLS: 1
SHORTNESS OF BREATH: 1

## 2018-06-01 ASSESSMENT — ANXIETY QUESTIONNAIRES
5. BEING SO RESTLESS THAT IT IS HARD TO SIT STILL: SEVERAL DAYS
3. WORRYING TOO MUCH ABOUT DIFFERENT THINGS: NOT AT ALL
2. NOT BEING ABLE TO STOP OR CONTROL WORRYING: NOT AT ALL
7. FEELING AFRAID AS IF SOMETHING AWFUL MIGHT HAPPEN: NOT AT ALL
GAD7 TOTAL SCORE: 1
1. FEELING NERVOUS, ANXIOUS, OR ON EDGE: NOT AT ALL
6. BECOMING EASILY ANNOYED OR IRRITABLE: NOT AT ALL

## 2018-06-01 ASSESSMENT — PATIENT HEALTH QUESTIONNAIRE - PHQ9: 5. POOR APPETITE OR OVEREATING: NOT AT ALL

## 2018-06-01 ASSESSMENT — ACTIVITIES OF DAILY LIVING (ADL)
I_NEED_ASSISTANCE_FOR_THE_FOLLOWING_DAILY_ACTIVITIES:: NO ASSISTANCE IS NEEDED
I_NEED_ASSISTANCE_FOR_THE_FOLLOWING_DAILY_ACTIVITIES:: TELEPHONE USE
CURRENT_FUNCTION: NO ASSISTANCE NEEDED
CURRENT_FUNCTION: TELEPHONE REQUIRES ASSISTANCE

## 2018-06-01 NOTE — NURSING NOTE
Screening Questionnaire for Adult Immunization    Are you sick today?   No   Do you have allergies to medications, food, a vaccine component or latex?   No   Have you ever had a serious reaction after receiving a vaccination?   No   Do you have a long-term health problem with heart disease, lung disease, asthma, kidney disease, metabolic disease (e.g. diabetes), anemia, or other blood disorder?   No   Do you have cancer, leukemia, HIV/AIDS, or any other immune system problem?   No   In the past 3 months, have you taken medications that affect  your immune system, such as prednisone, other steroids, or anticancer drugs; drugs for the treatment of rheumatoid arthritis, Crohn s disease, or psoriasis; or have you had radiation treatments?   No   Have you had a seizure, or a brain or other nervous system problem?   No   During the past year, have you received a transfusion of blood or blood     products, or been given immune (gamma) globulin or antiviral drug?   No   For women: Are you pregnant or is there a chance you could become        pregnant during the next month?   No   Have you received any vaccinations in the past 4 weeks?   No     Immunization questionnaire answers were all negative.        Per orders of Dr. Landa, injection of Pneumo 13 given by Elva Rader. Patient instructed to remain in clinic for 15 minutes afterwards, and to report any adverse reaction to me immediately.       Screening performed by Elva Rader on 6/1/2018 at 12:43 PM.

## 2018-06-01 NOTE — TELEPHONE ENCOUNTER
Reason for Call:  Form, our goal is to have forms completed with 72 hours, however, some forms may require a visit or additional information.    Type of letter, form or note:  Urology     Who is the form from?: [facility] (if other please explain)    Where did the form come from: form was faxed in    What clinic location was the form placed at?: [facility] - [phone]    Where the form was placed: 's Box    What number is listed as a contact on the form?: [phone]       Additional comments: none    Call taken on 6/1/2018 at 2:02 PM by Barbara Webber

## 2018-06-01 NOTE — LETTER
My Depression Action Plan  Name: Tiesha Gurrola   Date of Birth 1950  Date: 5/31/2018    My doctor: Jessy David   My clinic: [address]  [facility] [address] [phone]          GREEN    ZONE   Good Control    What it looks like:     Things are going generally well. You have normal up s and down s. You may even feel depressed from time to time, but bad moods usually last less than a day.   What you need to do:  1. Continue to care for yourself (see self care plan)  2. Check your depression survival kit and update it as needed  3. Follow your physician s recommendations including any medication.  4. Do not stop taking medication unless you consult with your physician first.           YELLOW         ZONE Getting Worse    What it looks like:     Depression is starting to interfere with your life.     It may be hard to get out of bed; you may be starting to isolate yourself from others.    Symptoms of depression are starting to last most all day and this has happened for several days.     You may have suicidal thoughts but they are not constant.   What you need to do:     1. Call your care team, your response to treatment will improve if you keep your care team informed of your progress. Yellow periods are signs an adjustment may need to be made.     2. Continue your self-care, even if you have to fake it!    3. Talk to someone in your support network    4. Open up your depression survival kit           RED    ZONE Medical Alert - Get Help    What it looks like:     Depression is seriously interfering with your life.     You may experience these or other symptoms: You can t get out of bed most days, can t work or engage in other necessary activities, you have trouble taking care of basic hygiene, or basic responsibilities, thoughts of suicide or death that will not go away, self-injurious behavior.     What you need to do:  1. Call your care  team and request a same-day appointment. If they are not available (weekends or after hours) call your local crisis line, emergency room or 911.            Depression Self Care Plan / Survival Kit    Self-Care for Depression  Here s the deal. Your body and mind are really not as separate as most people think.  What you do and think affects how you feel and how you feel influences what you do and think. This means if you do things that people who feel good do, it will help you feel better.  Sometimes this is all it takes.  There is also a place for medication and therapy depending on how severe your depression is, so be sure to consult with your medical provider and/ or Behavioral Health Consultant if your symptoms are worsening or not improving.     In order to better manage my stress, I will:    Exercise  Get some form of exercise, every day. This will help reduce pain and release endorphins, the  feel good  chemicals in your brain. This is almost as good as taking antidepressants!  This is not the same as joining a gym and then never going! (they count on that by the way ) It can be as simple as just going for a walk or doing some gardening, anything that will get you moving.      Hygiene   Maintain good hygiene (Get out of bed in the morning, Make your bed, Brush your teeth, Take a shower, and Get dressed like you were going to work, even if you are unemployed).  If your clothes don't fit try to get ones that do.    Diet  I will strive to eat foods that are good for me, drink plenty of water, and avoid excessive sugar, caffeine, alcohol, and other mood-altering substances.  Some foods that are helpful in depression are: complex carbohydrates, B vitamins, flaxseed, fish or fish oil, fresh fruits and vegetables.    Psychotherapy  I agree to participate in Individual Therapy (if recommended).    Medication  If prescribed medications, I agree to take them.  Missing doses can result in serious side effects.  I  understand that drinking alcohol, or other illicit drug use, may cause potential side effects.  I will not stop my medication abruptly without first discussing it with my provider.    Staying Connected With Others  I will stay in touch with my friends, family members, and my primary care provider/team.    Use your imagination  Be creative.  We all have a creative side; it doesn t matter if it s oil painting, sand castles, or mud pies! This will also kick up the endorphins.    Witness Beauty  (AKA stop and smell the roses) Take a look outside, even in mid-winter. Notice colors, textures. Watch the squirrels and birds.     Service to others  Be of service to others.  There is always someone else in need.  By helping others we can  get out of ourselves  and remember the really important things.  This also provides opportunities for practicing all the other parts of the program.    Humor  Laugh and be silly!  Adjust your TV habits for less news and crime-drama and more comedy.    Control your stress  Try breathing deep, massage therapy, biofeedback, and meditation. Find time to relax each day.     My support system    Clinic Contact:  Phone number:    Contact 1:  Phone number:    Contact 2:  Phone number:    [employer]:  Phone number:    Therapist:  Phone number:    Local crisis center:    Phone number:    Other community support:  Phone number:

## 2018-06-01 NOTE — MR AVS SNAPSHOT
After Visit Summary   6/1/2018    Tiesha Gurrola    MRN: [id number]           Patient Information     Date Of Birth          1950        Visit Information        Provider Department      6/1/2018 11:20 AM Jessy David MD [facility]        Today's Diagnoses     Encounter for routine adult health examination without abnormal findings    -  1    Asymptomatic postmenopausal status        Need for hepatitis C screening test        Need for prophylactic vaccination against Streptococcus pneumoniae (pneumococcus)        Morbid obesity (H)        Hyperlipidemia, unspecified hyperlipidemia type        Paroxysmal atrial fibrillation (H)        Major depressive disorder, recurrent episode, moderate (H)        Bilateral hearing loss, unspecified hearing loss type        Personal history of tobacco use        Urinary incontinence, unspecified type        Bilateral carpal tunnel syndrome        RENU (obstructive sleep apnea)          Care Instructions      Preventive Health Recommendations    Female Ages 65 +    Yearly exam:     See your health care provider every year in order to  o Review health changes.   o Discuss preventive care.    o Review your medicines if your doctor has prescribed any.      You no longer need a yearly Pap test unless you've had an abnormal Pap test in the past 10 years. If you have vaginal symptoms, such as bleeding or discharge, be sure to talk with your provider about a Pap test.      Every 1 to 2 years, have a mammogram.  If you are over 69, talk with your health care provider about whether or not you want to continue having screening mammograms.      Every 10 years, have a colonoscopy. Or, have a yearly FIT test (stool test). These exams will check for colon cancer.       Have a cholesterol test every 5 years, or more often if your doctor advises it.       Have a diabetes test (fasting glucose) every three years. If you are at risk for diabetes, you  should have this test more often.       At age 65, have a bone density scan (DEXA) to check for osteoporosis (brittle bone disease).    Shots:    Get a flu shot each year.    Get a tetanus shot every 10 years.    Talk to your doctor about your pneumonia vaccines. There are now two you should receive - Pneumovax (PPSV 23) and Prevnar (PCV 13).    Talk to your doctor about the shingles vaccine.    Talk to your doctor about the hepatitis B vaccine.    Nutrition:     Eat at least 5 servings of fruits and vegetables each day.      Eat whole-grain bread, whole-wheat pasta and brown rice instead of white grains and rice.      Talk to your provider about Calcium and Vitamin D.     Lifestyle    Exercise at least 150 minutes a week (30 minutes a day, 5 days a week). This will help you control your weight and prevent disease.      Limit alcohol to one drink per day.      No smoking.       Wear sunscreen to prevent skin cancer.       See your dentist twice a year for an exam and cleaning.      See your eye doctor every 1 to 2 years to screen for conditions such as glaucoma, macular degeneration and cataracts.    Dr. Sexton in [place] 6/18/18 110pm bring insurance card and photo id.            Follow-ups after your visit        Additional Services     ORTHO  REFERRAL       [facility] is referring you to the Orthopedic  Services at [facility].       The  Representative will assist you in the coordination of your Orthopedic and Musculoskeletal Care as prescribed by your physician.    The  Representative will call you within 1 business day to help schedule your appointment, or you may contact the  Representative at:    All areas ~ [phone]     Type of Referral : Surgical / Specialist -- would like to see [facility] in [place]--she goes there for her hip      Timeframe requested: Routine    Coverage of these services is subject to the  terms and limitations of your health insurance plan.  Please call member services at your health plan with any benefit or coverage questions.      If X-rays, CT or MRI's have been performed, please contact the facility where they were done to arrange for , prior to your scheduled appointment.  Please bring this referral request to your appointment and present it to your specialist.            SLEEP EVALUATION & MANAGEMENT REFERRAL - ADULT -[facility] [phone] (Age 13 if over 100 lbs)       Please be aware that coverage of these services is subject to the terms and limitations of your health insurance plan.  Call member services at your health plan with any benefit or coverage questions.      Please bring the following to your appointment:    >>   List of current medications   >>   This referral request   >>   Any documents/labs given to you for this referral                UROLOGY ADULT REFERRAL       Your provider has referred you to: N: [facility] [phone]   [email or web address]    Please be aware that coverage of these services is subject to the terms and limitations of your health insurance plan.  Call member services at your health plan with any benefit or coverage questions.      Please bring the following with you to your appointment:    (1) Any X-Rays, CTs or MRIs which have been performed.  Contact the facility where they were done to arrange for  prior to your scheduled appointment.    (2) List of current medications  (3) This referral request   (4) Any documents/labs given to you for this referral                  Your next 10 appointments already scheduled     Jun 04, 2018  9:45 AM CDT   [facility]   [facility]    [address] [facility] [id number] [phone]           Please do not eat 10-12 hours before your appointment if you are coming in fasting for labs on  lipids, cholesterol, or glucose (sugar). This does not apply to pregnant women. Water, hot tea and black coffee (with nothing added) are okay. Do not drink other fluids, diet soda or chew gum.            Jun 18, 2018 12:30 PM CDT   DX HIP/PELVIS/SPINE with PHDX1   [facility] ([facility])    [address] [place] [id number] [phone]           Please do not take any of the following 24 hours prior to the day of your exam: vitamins, calcium tablets, antacids.  If possible, please wear clothes without metal (snaps, zippers). A sweatsuit works well.              Future tests that were ordered for you today     Open Future Orders        Priority Expected Expires Ordered    DEXA HIP/PELVIS/SPINE - Future Routine  5/31/2019 6/1/2018    Hepatitis C Screen Reflex to HCV RNA Quant and Genotype Routine  7/1/2018 6/1/2018    Lipid panel reflex to direct LDL Fasting Routine  7/1/2018 6/1/2018    Comprehensive metabolic panel Routine  7/1/2018 6/1/2018    TSH with free T4 reflex Routine  7/1/2018 6/1/2018    SLEEP EVALUATION & MANAGEMENT REFERRAL - ADULT -[facility] [phone] (Age 13 if over 100 lbs) Routine  6/1/2019 6/1/2018            Who to contact     If you have questions or need follow up information about today's clinic visit or your schedule please [facility] at [phone].  Normal or non-critical lab and imaging results will be communicated to you by MyChart, letter or phone within 4 business days after the clinic has received the results. If you do not hear from us within 7 days, please contact the clinic through MyChart or phone. If you have a critical or abnormal lab result, we will notify you by phone as soon as possible.  Submit refill requests through [employer] or call your pharmacy and they will forward the refill request to us. Please allow 3 business days for your refill to be completed.           "Additional Information About Your Visit        Care EveryWhere ID     This is your Care EveryWhere ID. This could be used by other organizations to access your [facility] medical records  [id number]        Your Vitals Were     Pulse Temperature Height Last Period Pulse Oximetry Breastfeeding?    77 97.5  F (36.4  C) (Temporal) 5' 4\" (1.626 m) 05/01/2009 96% No    BMI (Body Mass Index)                   39.14 kg/m2            Blood Pressure from Last 3 Encounters:   06/01/18 122/60   11/16/17 124/74   08/01/17 136/66    Weight from Last 3 Encounters:   06/01/18 228 lb (103.4 kg)   11/16/17 220 lb (99.8 kg)   08/01/17 219 lb (99.3 kg)              We Performed the Following     ADMIN: Vaccine, Initial (75494)     ORTHO  REFERRAL     Pneumococcal vaccine 13 valent PCV13 IM (Prevnar) [64094]     Prof Fee: Shared Decision Making Visit for Lung Cancer Screening     UROLOGY ADULT REFERRAL          Today's Medication Changes          These changes are accurate as of 6/1/18 12:36 PM.  If you have any questions, ask your nurse or doctor.               Start taking these medicines.        Dose/Directions    nicotine polacrilex 4 MG gum   Commonly known as:  NICORETTE   Used for:  Personal history of tobacco use   Started by:  Jessy David MD        Dose:  4 mg   Place 1 each (4 mg) inside cheek as needed for smoking cessation   Quantity:  50 tablet   Refills:  3         These medicines have changed or have updated prescriptions.        Dose/Directions    escitalopram 10 MG tablet   Commonly known as:  LEXAPRO   This may have changed:    - medication strength  - how much to take   Used for:  Major depressive disorder, recurrent episode, moderate (H)   Changed by:  Jessy David MD        Dose:  10 mg   Take 1 tablet (10 mg) by mouth daily   Quantity:  90 tablet   Refills:  3       simvastatin 5 MG tablet   Commonly known as:  ZOCOR   This may have changed:  See the new instructions.   Used for:  " Hyperlipidemia, unspecified hyperlipidemia type   Changed by:  Jessy David MD        Dose:  5 mg   Take 1 tablet (5 mg) by mouth daily   Quantity:  90 tablet   Refills:  3            Where to get your medicines      These medications were sent to [id number] - [place], [place] - [address] [facility]  [address] [facility] [address]     Phone:  [phone]     escitalopram 10 MG tablet    nicotine polacrilex 4 MG gum    simvastatin 5 MG tablet                Primary Care Provider Office Phone # Fax #    Jessy David [phone]       [address]  [facility] [address]        Equal Access to Services     [facility]: Hadii dominik bhaktao Sovero, waaxda luqadaha, qaybta kaalmada adeegyada, judith mejia . So [facility] [phone].    ATENCIÓN: Si habla español, tiene a mahoney disposición servicios gratuitos de asistencia lingüística. Llame al [phone].    We comply with applicable federal civil rights laws and [place] laws. We do not discriminate on the basis of race, color, national origin, age, disability, sex, sexual orientation, or gender identity.            Thank you!     Thank you for choosing [facility]  for your care. Our goal is always to provide you with excellent care. Hearing back from our patients is one way we can continue to improve our services. Please take a few minutes to complete the written survey that you may receive in the mail after your visit with us. Thank you!             Your Updated Medication List - Protect others around you: Learn how to safely use, store and throw away your medicines at www.disposemymeds.org.          This list is accurate as of 6/1/18 12:36 PM.  Always use your most recent med list.                   Brand Name Dispense Instructions for use Diagnosis    ACE/ARB/ARNI NOT PRESCRIBED (INTENTIONAL)      Please choose reason not prescribed, below    Major depressive disorder,  recurrent episode, moderate (H)       albuterol 108 (90 Base) MCG/ACT Inhaler    VENTOLIN HFA    1 Inhaler    Inhale 2 puffs into the lungs every 4 hours as needed for shortness of breath / dyspnea or wheezing    SOB (shortness of breath)       ASPIRIN PO      Take 81 mg by mouth every other day Reported on 4/5/2017        BENADRYL PO      Reported on 5/14/2017        cetirizine 10 MG tablet    zyrTEC    60 tablet    Take 1 tablet (10 mg) by mouth 2 times daily    Pruritic disorder       escitalopram 10 MG tablet    LEXAPRO    90 tablet    Take 1 tablet (10 mg) by mouth daily    Major depressive disorder, recurrent episode, moderate (H)       flecainide acetate 150 MG Tabs     180 tablet    Take 1 tablet (150 mg) by mouth every 12 hours    Paroxysmal atrial fibrillation (H)       nicotine polacrilex 4 MG gum    NICORETTE    50 tablet    Place 1 each (4 mg) inside cheek as needed for smoking cessation    Personal history of tobacco use       simvastatin 5 MG tablet    ZOCOR    90 tablet    Take 1 tablet (5 mg) by mouth daily    Hyperlipidemia, unspecified hyperlipidemia type       tiZANidine 2 MG tablet    ZANAFLEX    40 tablet    Take 1 tablet (2 mg) by mouth 3 times daily    Acute pain of left shoulder, Impingement syndrome of left shoulder, Cervicalgia       triamcinolone 0.1 % cream    KENALOG    80 g    Mix entire tube of Kenalog (triamincinolone cream) with 16 oz. Of Cera-Ve or Cetaphil lotion, KEEP REFRIGERATED    Pruritus, Dermatitis

## 2018-06-02 ASSESSMENT — PATIENT HEALTH QUESTIONNAIRE - PHQ9: SUM OF ALL RESPONSES TO PHQ QUESTIONS 1-9: 3

## 2018-06-02 ASSESSMENT — ANXIETY QUESTIONNAIRES: GAD7 TOTAL SCORE: 1

## 2018-06-04 ENCOUNTER — TELEPHONE (OUTPATIENT)
Dept: FAMILY MEDICINE | Facility: OTHER | Age: 68
End: 2018-06-04

## 2018-06-04 NOTE — TELEPHONE ENCOUNTER
We discussed a lot of things at that visit but I do not recall us discussing Xanax.  I did restart the Lexapro.  Also we discussed her rash that she saw dermatology for, and that she was given Zyrtec but I do not remember us discussing using triamcinolone.  I apologize if these things were discussed, but I do not recall this do not see this in my notes, therefore if she wants to discuss these further she will need another appointment.

## 2018-06-04 NOTE — TELEPHONE ENCOUNTER
----- Message from Jessy David MD sent at 6/1/2018  6:09 PM CDT -----  Abnormal mammogram, needs further views.    Electronically signed by Jessy David MD on 6/1/2018

## 2018-06-04 NOTE — TELEPHONE ENCOUNTER
LM for patient to return call to clinic. When call is returned please see note below.     Zohreh Conner MA

## 2018-06-04 NOTE — TELEPHONE ENCOUNTER
[facility]  They state patient was wondering based on visit from 6/1/18 if she was going to get Rx's for Xanax and Triamcinolone cream?     Neither are on active med list so will have you add with dosages you want if approved.

## 2018-06-05 NOTE — TELEPHONE ENCOUNTER
Left message for patient to return call to clinic.  Please inform patient of message below.  Also see other telephone encounter.    Isamar Resendez, [facility]  June 5, 2018

## 2018-06-12 ENCOUNTER — HOSPITAL ENCOUNTER (OUTPATIENT)
Dept: MAMMOGRAPHY | Facility: CLINIC | Age: 68
End: 2018-06-12
Attending: FAMILY MEDICINE
Payer: MEDICARE

## 2018-06-12 ENCOUNTER — HOSPITAL ENCOUNTER (OUTPATIENT)
Dept: ULTRASOUND IMAGING | Facility: CLINIC | Age: 68
Discharge: HOME | End: 2018-06-12
Attending: FAMILY MEDICINE | Admitting: FAMILY MEDICINE
Payer: MEDICARE

## 2018-06-12 DIAGNOSIS — R92.8 ABNORMAL MAMMOGRAM: ICD-10-CM

## 2018-06-12 PROCEDURE — 77065 DX MAMMO INCL CAD UNI: CPT

## 2018-06-12 PROCEDURE — 76642 ULTRASOUND BREAST LIMITED: CPT | Mod: LT

## 2018-06-13 DIAGNOSIS — E78.5 HYPERLIPIDEMIA, UNSPECIFIED HYPERLIPIDEMIA TYPE: ICD-10-CM

## 2018-06-13 DIAGNOSIS — Z11.59 NEED FOR HEPATITIS C SCREENING TEST: ICD-10-CM

## 2018-06-13 DIAGNOSIS — I48.0 PAROXYSMAL ATRIAL FIBRILLATION (H): ICD-10-CM

## 2018-06-13 DIAGNOSIS — F33.1 MAJOR DEPRESSIVE DISORDER, RECURRENT EPISODE, MODERATE (H): ICD-10-CM

## 2018-06-13 LAB
ALBUMIN SERPL-MCNC: 3.6 G/DL (ref 3.4–5)
ALP SERPL-CCNC: 58 U/L (ref 40–150)
ALT SERPL W P-5'-P-CCNC: 26 U/L (ref 0–50)
ANION GAP SERPL CALCULATED.3IONS-SCNC: 7 MMOL/L (ref 3–14)
AST SERPL W P-5'-P-CCNC: 13 U/L (ref 0–45)
BILIRUB SERPL-MCNC: 0.2 MG/DL (ref 0.2–1.3)
BUN SERPL-MCNC: 18 MG/DL (ref 7–30)
CALCIUM SERPL-MCNC: 8.7 MG/DL (ref 8.5–10.1)
CHLORIDE SERPL-SCNC: 110 MMOL/L (ref 94–109)
CHOLEST SERPL-MCNC: 187 MG/DL
CO2 SERPL-SCNC: 25 MMOL/L (ref 20–32)
CREAT SERPL-MCNC: 0.73 MG/DL (ref 0.52–1.04)
GFR SERPL CREATININE-BSD FRML MDRD: 80 ML/MIN/1.7M2
GLUCOSE SERPL-MCNC: 94 MG/DL (ref 70–99)
HCV AB SERPL QL IA: NONREACTIVE
HDLC SERPL-MCNC: 42 MG/DL
LDLC SERPL CALC-MCNC: 114 MG/DL
NONHDLC SERPL-MCNC: 145 MG/DL
POTASSIUM SERPL-SCNC: 4.7 MMOL/L (ref 3.4–5.3)
PROT SERPL-MCNC: 7.3 G/DL (ref 6.8–8.8)
SODIUM SERPL-SCNC: 142 MMOL/L (ref 133–144)
TRIGL SERPL-MCNC: 154 MG/DL
TSH SERPL DL<=0.005 MIU/L-ACNC: 3.27 MU/L (ref 0.4–4)

## 2018-06-13 PROCEDURE — 80053 COMPREHEN METABOLIC PANEL: CPT | Performed by: FAMILY MEDICINE

## 2018-06-13 PROCEDURE — 36415 COLL VENOUS BLD VENIPUNCTURE: CPT | Performed by: FAMILY MEDICINE

## 2018-06-13 PROCEDURE — 80061 LIPID PANEL: CPT | Performed by: FAMILY MEDICINE

## 2018-06-13 PROCEDURE — 84443 ASSAY THYROID STIM HORMONE: CPT | Performed by: FAMILY MEDICINE

## 2018-06-13 PROCEDURE — 86803 HEPATITIS C AB TEST: CPT | Performed by: FAMILY MEDICINE

## 2018-06-18 ENCOUNTER — HOSPITAL ENCOUNTER (OUTPATIENT)
Dept: BONE DENSITY | Facility: CLINIC | Age: 68
Discharge: HOME | End: 2018-06-18
Attending: FAMILY MEDICINE | Admitting: FAMILY MEDICINE
Payer: MEDICARE

## 2018-06-18 ENCOUNTER — TRANSFERRED RECORDS (OUTPATIENT)
Dept: HEALTH INFORMATION MANAGEMENT | Facility: CLINIC | Age: 68
End: 2018-06-18

## 2018-06-18 DIAGNOSIS — Z78.0 ASYMPTOMATIC POSTMENOPAUSAL STATUS: ICD-10-CM

## 2018-06-18 PROCEDURE — 77080 DXA BONE DENSITY AXIAL: CPT

## 2018-06-26 ENCOUNTER — TRANSFERRED RECORDS (OUTPATIENT)
Dept: HEALTH INFORMATION MANAGEMENT | Facility: CLINIC | Age: 68
End: 2018-06-26

## 2018-08-07 NOTE — PROGRESS NOTES
[address]  [facility] [address]  [phone]  Dept: [phone]    PRE-OP EVALUATION:  Today's date: 8/10/2018    Tiesha Gurrola (: 1950) presents for pre-operative evaluation assessment as requested by Dr. Sexton.  She requires evaluation and anesthesia risk assessment prior to undergoing surgery/procedure for treatment of bladder .    Proposed Surgery/ Procedure: Cystoscopy  Date of Surgery/ Procedure: 18  Time of Surgery/ Procedure: 7:30am  Hospital/Surgical Facility: [facility]  Primary Physician: Jessy David  Type of Anesthesia Anticipated: General    Patient has a Health Care Directive or Living Will:  YES     1. NO - Do you have a history of heart attack, stroke, stent, bypass or surgery on an artery in the head, neck, heart or legs?  2. NO - Do you ever have any pain or discomfort in your chest?  3. NO - Do you have a history of  Heart Failure?  4. NO - Are you troubled by shortness of breath when: walking on the level, up a slight hill or at night?  5. NO - Do you currently have a cold, bronchitis or other respiratory infection?  6. NO - Do you have a cough, shortness of breath or wheezing?  7. NO - Do you sometimes get pains in the calves of your legs when you walk?  8. NO - Do you or anyone in your family have previous history of blood clots?  9. NO - Do you or does anyone in your family have a serious bleeding problem such as prolonged bleeding following surgeries or cuts?  10. NO - Have you ever had problems with anemia or been told to take iron pills?  11. NO - Have you had any abnormal blood loss such as black, tarry or bloody stools, or abnormal vaginal bleeding?  12. YES - Have you ever had a blood transfusion?  During ectopic pregnancy  13. NO - Have you or any of your relatives ever had problems with anesthesia?  14. YES - Do you have sleep apnea, excessive snoring or daytime drowsiness?  Snoring, has sleep consult  on the 30th  15. NO - Do you have any prosthetic heart valves?  16. YES - Do you have prosthetic joints? Right hip  17. NO - Is there any chance that you may be pregnant?      HPI:     HPI related to upcoming procedure: stress incontinence worsening over many years      A-FIB - Patient has a longstanding history of chronic A-fib currently on rhythm control. Denies significant symptoms of lightheadedness, palpitations or dyspnea.                                                                                                                                                                             .    MEDICAL HISTORY:     Patient Active Problem List    Diagnosis Date Noted     Morbid obesity (H) 05/23/2017     Priority: High     Health Care Home 07/20/2011     Priority: High     X    EMERGENCY CARE PLAN  Presenting Problem Signs and Symptoms Treatment Plan    Questions or conerns during clinic hours    I will call the clinic directly     Questions or conerns outside clinic hours    I will call the 24 hour nurse line at [phone]    Patient needs to schedule an appointment    I will call the 24 hour scheduling team at [phone] or clinic directly    Same day treatment     I will call the clinic first, nurse line if after hours, urgent care and express care if needed                            DX V65.8 REPLACED WITH [id number] [facility] CARE HOME (04/08/2013)       Polymorphic light eruption 08/01/2017     Priority: Medium     Acromioclavicular joint arthritis 06/09/2017     Priority: Medium     SOB (shortness of breath) 04/05/2017     Priority: Medium     Pruritic disorder 04/05/2017     Priority: Medium     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 7/6/2011          Anxiety 10/15/2009     Priority: Medium     Major depressive disorder, recurrent episode, moderate (H) 10/15/2009     Priority: Medium     Tobacco use disorder 03/25/2009     Priority: Medium     Mitral valve disorder 06/29/2006     Priority: Medium      Past Medical History:   Diagnosis Date     Adhesive capsulitis      Adhesive capsulitis of shoulder 7/30/2013     History of blood transfusion      Hyperlipemia      Mitral valve disorder      Mitral valve disorders(424.0)     Hx of mitral valve prolapse     OA (osteoarthritis) of hip      Osteoarthritis of acromioclavicular joint 10/17/2012     Paroxysmal atrial fibrillation (H) 7/2008     Paroxysmal supraventricular tachycardia (H)      Rotator cuff tear 10/17/2012     Tobacco abuse      Past Surgical History:   Procedure Laterality Date     C LAP,UTERUS,UNLISTED PROCEDURE  08/27/2007    Lyis of adhesions of the uterus to the abdominal wall.     C TREAT ECTOPIC PREG,ABD PREG  1974    about 3 mos.     CANALPLASTY Right 6/23/2016    Procedure: CANALPLASTY;  Surgeon: Rishabh Boudreaux MD;  Location: [facility] OR     COLONOSCOPY  07/11/07     DILATION AND CURETTAGE  2/18/16    HD&C     DILATION AND CURETTAGE, OPERATIVE HYSTEROSCOPY, COMBINED N/A 2/18/2016    Procedure: COMBINED DILATION AND CURETTAGE, OPERATIVE HYSTEROSCOPY;  Surgeon: Keshia Chang DO;  Location: [facility] OR     GRAFT THIERSCH STATUS POST TYMPANOMASTOIDECTOMY Right 6/30/2016    Procedure: GRAFT THIERSCH STATUS POST TYMPANOMASTOIDECTOMY;  Surgeon: Rishabh Boudreaux MD;  Location: [facility] OR     H ABLATION FOCAL AFIB  10/12/16     HC LAPAROSCOPY, SURGICAL; APPENDECTOMY  08/27/2007     JOINT REPLACEMENT, HIP RT/LT Right 8/12/14    Joint Replacement Hip RT/LT      MEATOPLASTY EAR Right 6/23/2016    Procedure: MEATOPLASTY EAR;  Surgeon: Rishabh Boudreaux MD;  [facility]: [facility] OR     MYRINGOTOMY Right 4/26/2016    Procedure: MYRINGOTOMY;  Surgeon: Evi Solorzano MD;  Location: [facility] OR     SHOULDER SURGERY Left 1/7/15    torn bicep, arthroplasty, rotator cuff     TYMPANOMASTOIDECTOMY Right 6/23/2016    Procedure: TYMPANOMASTOIDECTOMY;  Surgeon: Rishabh Boudreaux MD;  [facility]     TYMPANOMASTOIDECTOMY WITH FACIAL MONITORING  12/13/2011    Procedure:TYMPANOMASTOIDECTOMY WITH FACIAL MONITORING; right tympanoplasty, mastoidectomy with facial nerve monitoring; Surgeon:EVI SOLORZANO; Location:[facility] OR     Current Outpatient Prescriptions   Medication Sig Dispense Refill     ACE/ARB NOT PRESCRIBED, INTENTIONAL, Please choose reason not prescribed, below       albuterol (VENTOLIN HFA) 108 (90 BASE) MCG/ACT Inhaler Inhale 2 puffs into the lungs every 4 hours as needed for shortness of breath / dyspnea or wheezing 1 Inhaler 1     cetirizine (ZYRTEC) 10 MG tablet Take 1 tablet (10 mg) by mouth 2 times daily 60 tablet 11     DiphenhydrAMINE HCl (BENADRYL PO) Reported on 5/14/2017       escitalopram (LEXAPRO) 10 MG tablet Take 1 tablet (10 mg) by mouth daily 90 tablet 3     flecainide acetate 150 MG TABS Take 1 tablet (150 mg) by mouth every 12 hours 180 tablet 3     nicotine polacrilex (NICORETTE) 4 MG gum Place 1 each (4 mg) inside cheek as needed for smoking cessation 50 tablet 3     simvastatin (ZOCOR) 5 MG tablet Take 1 tablet (5 mg) by mouth daily 90 tablet 3     tiZANidine (ZANAFLEX) 2 MG tablet Take 1 tablet (2 mg) by mouth 3 times daily 40 tablet 1     traZODone (DESYREL) 50 MG tablet Take 0.5-1 tablets (25-50 mg) by mouth nightly as needed for sleep 60 tablet 0     triamcinolone (KENALOG) 0.1 % cream Mix entire tube of Kenalog (triamincinolone cream) with 16 oz. Of Cera-Ve or Cetaphil lotion, KEEP REFRIGERATED 80 g 0     OTC products: None, except as noted  "above    Allergies   Allergen Reactions     Oxycodone Nausea and Vomiting      Latex Allergy: NO    Social History   Substance Use Topics     Smoking status: Current Every Day Smoker     Packs/day: 0.25     Years: 32.00     Types: Cigarettes     Smokeless tobacco: Never Used      Comment: 7 cigs a day     Alcohol use No     History   Drug Use No       REVIEW OF SYSTEMS:   CONSTITUTIONAL: NEGATIVE for fever, chills, change in weight  INTEGUMENTARY/SKIN: NEGATIVE for worrisome rashes, moles or lesions  EYES: NEGATIVE for vision changes or irritation  ENT/MOUTH: NEGATIVE for ear, mouth and throat problems  RESP: NEGATIVE for significant cough or SOB  CV: NEGATIVE for chest pain, palpitations or peripheral edema  GI: NEGATIVE for nausea, abdominal pain, heartburn, or change in bowel habits  : NEGATIVE for frequency, dysuria, or hematuria  MUSCULOSKELETAL: NEGATIVE for significant arthralgias or myalgia  NEURO: NEGATIVE for weakness, dizziness or paresthesias  ENDOCRINE: NEGATIVE for temperature intolerance, skin/hair changes  HEME: NEGATIVE for bleeding problems  PSYCHIATRIC: NEGATIVE for changes in mood or affect    EXAM:   /72 (BP Location: Right arm, Patient Position: Chair, Cuff Size: Adult Large)  Pulse 80  Temp 97.5  F (36.4  C) (Temporal)  Resp 16  Ht 5' 4\" (1.626 m)  Wt 228 lb (103.4 kg)  LMP 05/01/2009  SpO2 96%  BMI 39.14 kg/m2    GENERAL APPEARANCE: healthy, alert and no distress     EYES: EOMI, PERRL     HENT: ear canals and TM's normal and nose and mouth without ulcers or lesions     NECK: no adenopathy, no asymmetry, masses, or scars and thyroid normal to palpation     RESP: lungs clear to auscultation - no rales, rhonchi or wheezes     CV: regular rates and rhythm, normal S1 S2, no S3 or S4 and no murmur, click or rub     ABDOMEN:  soft, nontender, no HSM or masses and bowel sounds normal     MS: extremities normal- no gross deformities noted, no evidence of inflammation in joints, FROM in " all extremities.     SKIN: no suspicious lesions or rashes     NEURO: Normal strength and tone, sensory exam grossly normal, mentation intact and speech normal     PSYCH: mentation appears normal. and affect normal/bright     LYMPHATICS: No cervical adenopathy    DIAGNOSTICS:   EKG: reviewed from 11/2017--Normal Sinus Rhythm, normal axis, normal intervals, no acute ST/T changes c/w ischemia, no LVH by voltage criteria    Recent Labs   Lab Test  06/13/18   0952  03/10/17   1600  10/12/16   0700   HGB   --   14.0  13.7   PLT   --   221  205   INR   --    --   0.99   NA  142  142  142   POTASSIUM  4.7  3.9  4.0   CR  0.73  0.82  0.69        IMPRESSION:   Reason for surgery/procedure: stress incontinence    The proposed surgical procedure is considered INTERMEDIATE risk.    REVISED CARDIAC RISK INDEX  The patient has the following serious cardiovascular risks for perioperative complications such as (MI, PE, VFib and 3  AV Block):  No serious cardiac risks  INTERPRETATION: 0 risks: Class I (very low risk - 0.4% complication rate)    The patient has the following additional risks for perioperative complications:  No identified additional risks      ICD-10-CM    1. Preop general physical exam Z01.818    2. Stress incontinence N39.3    3. Pruritus L29.9 triamcinolone (KENALOG) 0.1 % cream   4. Dermatitis L30.9 triamcinolone (KENALOG) 0.1 % cream   5. Insomnia, unspecified type G47.00 traZODone (DESYREL) 50 MG tablet       RECOMMENDATIONS:       --Patient is to take all scheduled medications on the day of surgery     APPROVAL GIVEN to proceed with proposed procedure, without further diagnostic evaluation     She asks to renew Xanax to help with sleep, states usually uses 50 pills in a year.  Recommend to use something different than Xanax to help with sleep, will try trazodone.    Signed Electronically by: Jessy David MD    Copy of this evaluation report is provided to requesting physician.    [facility] Preop  Guidelines    Revised Cardiac Risk Index

## 2018-08-10 ENCOUNTER — OFFICE VISIT (OUTPATIENT)
Dept: FAMILY MEDICINE | Facility: OTHER | Age: 68
End: 2018-08-10
Payer: COMMERCIAL

## 2018-08-10 ENCOUNTER — TELEPHONE (OUTPATIENT)
Dept: FAMILY MEDICINE | Facility: OTHER | Age: 68
End: 2018-08-10

## 2018-08-10 VITALS
BODY MASS INDEX: 38.93 KG/M2 | HEART RATE: 80 BPM | SYSTOLIC BLOOD PRESSURE: 120 MMHG | HEIGHT: 64 IN | RESPIRATION RATE: 16 BRPM | DIASTOLIC BLOOD PRESSURE: 72 MMHG | TEMPERATURE: 97.5 F | WEIGHT: 228 LBS | OXYGEN SATURATION: 96 %

## 2018-08-10 DIAGNOSIS — N39.3 STRESS INCONTINENCE: ICD-10-CM

## 2018-08-10 DIAGNOSIS — L29.9 PRURITUS: ICD-10-CM

## 2018-08-10 DIAGNOSIS — G47.00 INSOMNIA, UNSPECIFIED TYPE: ICD-10-CM

## 2018-08-10 DIAGNOSIS — L30.9 DERMATITIS: ICD-10-CM

## 2018-08-10 DIAGNOSIS — G47.00 INSOMNIA, UNSPECIFIED TYPE: Primary | ICD-10-CM

## 2018-08-10 DIAGNOSIS — Z01.818 PREOP GENERAL PHYSICAL EXAM: Primary | ICD-10-CM

## 2018-08-10 PROCEDURE — 99214 OFFICE O/P EST MOD 30 MIN: CPT | Performed by: FAMILY MEDICINE

## 2018-08-10 RX ORDER — TRIAMCINOLONE ACETONIDE 1 MG/G
CREAM TOPICAL
Qty: 80 G | Refills: 0 | Status: SHIPPED | OUTPATIENT
Start: 2018-08-10 | End: 2018-08-10

## 2018-08-10 RX ORDER — TRAZODONE HYDROCHLORIDE 50 MG/1
25-50 TABLET, FILM COATED ORAL
Qty: 60 TABLET | Refills: 0 | Status: SHIPPED | OUTPATIENT
Start: 2018-08-10 | End: 2018-08-10

## 2018-08-10 RX ORDER — TRIAMCINOLONE ACETONIDE 1 MG/G
CREAM TOPICAL
Qty: 80 G | Refills: 0 | Status: SHIPPED | OUTPATIENT
Start: 2018-08-10 | End: 2022-03-11

## 2018-08-10 RX ORDER — HYDROXYZINE HYDROCHLORIDE 25 MG/1
25-50 TABLET, FILM COATED ORAL
Qty: 60 TABLET | Refills: 0 | Status: SHIPPED | OUTPATIENT
Start: 2018-08-10 | End: 2019-08-23

## 2018-08-10 NOTE — TELEPHONE ENCOUNTER
Pharmacy calling, they state they got a rx for Kenalog without directions. They are asking for med to be re-sent with directions.  Lyndsay Todd, CMA

## 2018-08-10 NOTE — MR AVS SNAPSHOT
After Visit Summary   8/10/2018    Tiesha Gurrola    MRN: [id number]           Patient Information     Date Of Birth          1950        Visit Information        Provider Department      8/10/2018 12:00 PM Jessy David MD [facility]        Today's Diagnoses     Preop general physical exam    -  1    Stress incontinence        Pruritus        Dermatitis        Insomnia, unspecified type          Care Instructions      Before Your Surgery      Call your surgeon if there is any change in your health. This includes signs of a cold or flu (such as a sore throat, runny nose, cough, rash or fever).    Do not smoke, drink alcohol or take over the counter medicine (unless your surgeon or primary care doctor tells you to) for the 24 hours before and after surgery.    If you take prescribed drugs: Follow your doctor s orders about which medicines to take and which to stop until after surgery.    Eating and drinking prior to surgery: follow the instructions from your surgeon    Take a shower or bath the night before surgery. Use the soap your surgeon gave you to gently clean your skin. If you do not have soap from your surgeon, use your regular soap. Do not shave or scrub the surgery site.  Wear clean pajamas and have clean sheets on your bed.           Follow-ups after your visit        Your next 10 appointments already scheduled     Aug 20, 2018   Procedure with Kamari Sexton MD   [facility] ([facility])    [address] [place] [address] [address] [place] [address]   [phone]           From [address]: Exit at [address] on [address] side of [place]. Turn right on [address]. Turn left at stoplight on [address] [facility] will be in view two blocks ahead            Aug 30, 2018  1:30 PM CDT   New Sleep Patient with Jake Solorzano MD   [facility] ([facility])    [address]  "[place]  [facility] [address] [phone]              Who to contact     If you have questions or need follow up information about today's clinic visit or your schedule please contact [facility] [place] directly at [phone].  Normal or non-critical lab and imaging results will be communicated to you by MyChart, letter or phone within 4 business days after the clinic has received the results. If you do not hear from us within 7 days, please contact the clinic through MyChart or phone. If you have a critical or abnormal lab result, we will notify you by phone as soon as possible.  Submit refill requests through [employer] or call your pharmacy and they will forward the refill request to us. Please allow 3 business days for your refill to be completed.          Additional Information About Your Visit        Care EveryWhere ID     This is your Care EveryWhere ID. This could be used by other organizations to access your [facility] medical records  [id number]        Your Vitals Were     Pulse Temperature Respirations Height Last Period Pulse Oximetry    80 97.5  F (36.4  C) (Temporal) 16 5' 4\" (1.626 m) 05/01/2009 96%    BMI (Body Mass Index)                   39.14 kg/m2            Blood Pressure from Last 3 Encounters:   08/10/18 120/72   06/01/18 122/60   11/16/17 124/74    Weight from Last 3 Encounters:   08/10/18 228 lb (103.4 kg)   06/01/18 228 lb (103.4 kg)   11/16/17 220 lb (99.8 kg)              Today, you had the following     No orders found for display         Today's Medication Changes          These changes are accurate as of 8/10/18 12:57 PM.  If you have any questions, ask your nurse or doctor.               Start taking these medicines.        Dose/Directions    traZODone 50 MG tablet   Commonly known as:  DESYREL   Used for:  Insomnia, unspecified type   Started by:  Jessy David MD        Dose:  25-50 mg   Take 0.5-1 tablets (25-50 mg) by mouth nightly as needed for sleep "   Quantity:  60 tablet   Refills:  0            Where to get your medicines      These medications were sent to [id number] - [place], [place] - [address]  [address], [facility] [address]     Phone:  [phone]     traZODone 50 MG tablet    triamcinolone 0.1 % cream                Primary Care Provider Office Phone # Fax #    Jessy MOE MD Joann [phone]       [address]  [facility] [address]        Equal Access to Services     [facility] HospitalDAGOBERTO : Hadii aad ku hadasho Soomaali, waaxda luqadaha, qaybta kaalmada adeegyada, waxay idiin hayaan jay jay mejia . So [facility] [phone].    ATENCIÓN: Si habla español, tiene a mahoney disposición servicios gratuitos de asistencia lingüística. [facility] [phone].    We comply with applicable federal civil rights laws and [place] laws. We do not discriminate on the basis of race, color, national origin, age, disability, sex, sexual orientation, or gender identity.            Thank you!     Thank you for choosing [facility]  for your care. Our goal is always to provide you with excellent care. Hearing back from our patients is one way we can continue to improve our services. Please take a few minutes to complete the written survey that you may receive in the mail after your visit with us. Thank you!             Your Updated Medication List - Protect others around you: Learn how to safely use, store and throw away your medicines at www.disposemymeds.org.          This list is accurate as of 8/10/18 12:57 PM.  Always use your most recent med list.                   Brand Name Dispense Instructions for use Diagnosis    ACE/ARB/ARNI NOT PRESCRIBED (INTENTIONAL)      Please choose reason not prescribed, below    Major depressive disorder, recurrent episode, moderate (H)       albuterol 108 (90 Base) MCG/ACT inhaler    VENTOLIN HFA    1 Inhaler    Inhale 2 puffs into the lungs every 4 hours as needed for shortness  of breath / dyspnea or wheezing    SOB (shortness of breath)       BENADRYL PO      Reported on 5/14/2017        cetirizine 10 MG tablet    zyrTEC    60 tablet    Take 1 tablet (10 mg) by mouth 2 times daily    Pruritic disorder       escitalopram 10 MG tablet    LEXAPRO    90 tablet    Take 1 tablet (10 mg) by mouth daily    Major depressive disorder, recurrent episode, moderate (H)       flecainide acetate 150 MG Tabs     180 tablet    Take 1 tablet (150 mg) by mouth every 12 hours    Paroxysmal atrial fibrillation (H)       nicotine polacrilex 4 MG gum    NICORETTE    50 tablet    Place 1 each (4 mg) inside cheek as needed for smoking cessation    Personal history of tobacco use       simvastatin 5 MG tablet    ZOCOR    90 tablet    Take 1 tablet (5 mg) by mouth daily    Hyperlipidemia, unspecified hyperlipidemia type       tiZANidine 2 MG tablet    ZANAFLEX    40 tablet    Take 1 tablet (2 mg) by mouth 3 times daily    Acute pain of left shoulder, Impingement syndrome of left shoulder, Cervicalgia       traZODone 50 MG tablet    DESYREL    60 tablet    Take 0.5-1 tablets (25-50 mg) by mouth nightly as needed for sleep    Insomnia, unspecified type       triamcinolone 0.1 % cream    KENALOG    80 g    Mix entire tube of Kenalog (triamincinolone cream) with 16 oz. Of Cera-Ve or Cetaphil lotion, KEEP REFRIGERATED    Pruritus, Dermatitis

## 2018-08-10 NOTE — TELEPHONE ENCOUNTER
Please call [facility].  They report there is an interaction with the trazadone that was prescribed.  thanks

## 2018-08-10 NOTE — TELEPHONE ENCOUNTER
TC- You ordered Trazodone today for pt, pharmacy just wanted to make sure you were aware pt is also taking flecainide there can be an interaction with these 2 medications. Please review and let [facility] know your decision.    Daysi Alcaraz, RN, BSN

## 2018-08-17 NOTE — H&P (VIEW-ONLY)
[address]  [facility] [address]  [phone]  Dept: [phone]    PRE-OP EVALUATION:  Today's date: 8/10/2018    Tiesha Gurrola (: 1950) presents for pre-operative evaluation assessment as requested by Dr. Sexton.  She requires evaluation and anesthesia risk assessment prior to undergoing surgery/procedure for treatment of bladder .    Proposed Surgery/ Procedure: Cystoscopy  Date of Surgery/ Procedure: 18  Time of Surgery/ Procedure: 7:30am  Hospital/Surgical Facility: [facility]  Primary Physician: Jessy David  Type of Anesthesia Anticipated: General    Patient has a Health Care Directive or Living Will:  YES     1. NO - Do you have a history of heart attack, stroke, stent, bypass or surgery on an artery in the head, neck, heart or legs?  2. NO - Do you ever have any pain or discomfort in your chest?  3. NO - Do you have a history of  Heart Failure?  4. NO - Are you troubled by shortness of breath when: walking on the level, up a slight hill or at night?  5. NO - Do you currently have a cold, bronchitis or other respiratory infection?  6. NO - Do you have a cough, shortness of breath or wheezing?  7. NO - Do you sometimes get pains in the calves of your legs when you walk?  8. NO - Do you or anyone in your family have previous history of blood clots?  9. NO - Do you or does anyone in your family have a serious bleeding problem such as prolonged bleeding following surgeries or cuts?  10. NO - Have you ever had problems with anemia or been told to take iron pills?  11. NO - Have you had any abnormal blood loss such as black, tarry or bloody stools, or abnormal vaginal bleeding?  12. YES - Have you ever had a blood transfusion?  During ectopic pregnancy  13. NO - Have you or any of your relatives ever had problems with anesthesia?  14. YES - Do you have sleep apnea, excessive snoring or daytime drowsiness?  Snoring, has sleep consult  on the 30th  15. NO - Do you have any prosthetic heart valves?  16. YES - Do you have prosthetic joints? Right hip  17. NO - Is there any chance that you may be pregnant?      HPI:     HPI related to upcoming procedure: stress incontinence worsening over many years      A-FIB - Patient has a longstanding history of chronic A-fib currently on rhythm control. Denies significant symptoms of lightheadedness, palpitations or dyspnea.                                                                                                                                                                             .    MEDICAL HISTORY:     Patient Active Problem List    Diagnosis Date Noted     Morbid obesity (H) 05/23/2017     Priority: High     Health Care Home 07/20/2011     Priority: High     X    EMERGENCY CARE PLAN  Presenting Problem Signs and Symptoms Treatment Plan    Questions or conerns during clinic hours    I will call the clinic directly     Questions or conerns outside clinic hours    I will call the 24 hour nurse line at [phone]    Patient needs to schedule an appointment    I will call the 24 hour scheduling team at [phone] or clinic directly    Same day treatment     I will call the clinic first, nurse line if after hours, urgent care and express care if needed                            DX V65.8 REPLACED WITH [id number] [facility] CARE HOME (04/08/2013)       Polymorphic light eruption 08/01/2017     Priority: Medium     Acromioclavicular joint arthritis 06/09/2017     Priority: Medium     SOB (shortness of breath) 04/05/2017     Priority: Medium     Pruritic disorder 04/05/2017     Priority: Medium     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 7/6/2011          Anxiety 10/15/2009     Priority: Medium     Major depressive disorder, recurrent episode, moderate (H) 10/15/2009     Priority: Medium     Tobacco use disorder 03/25/2009     Priority: Medium     Mitral valve disorder 06/29/2006     Priority: Medium      Past Medical History:   Diagnosis Date     Adhesive capsulitis      Adhesive capsulitis of shoulder 7/30/2013     History of blood transfusion      Hyperlipemia      Mitral valve disorder      Mitral valve disorders(424.0)     Hx of mitral valve prolapse     OA (osteoarthritis) of hip      Osteoarthritis of acromioclavicular joint 10/17/2012     Paroxysmal atrial fibrillation (H) 7/2008     Paroxysmal supraventricular tachycardia (H)      Rotator cuff tear 10/17/2012     Tobacco abuse      Past Surgical History:   Procedure Laterality Date     C LAP,UTERUS,UNLISTED PROCEDURE  08/27/2007    Lyis of adhesions of the uterus to the abdominal wall.     C TREAT ECTOPIC PREG,ABD PREG  1974    about 3 mos.     CANALPLASTY Right 6/23/2016    Procedure: CANALPLASTY;  Surgeon: Rishabh Boudreaux MD;  Location: [facility] OR     COLONOSCOPY  07/11/07     DILATION AND CURETTAGE  2/18/16    HD&C     DILATION AND CURETTAGE, OPERATIVE HYSTEROSCOPY, COMBINED N/A 2/18/2016    Procedure: COMBINED DILATION AND CURETTAGE, OPERATIVE HYSTEROSCOPY;  Surgeon: Keshia Chang DO;  Location: [facility] OR     GRAFT THIERSCH STATUS POST TYMPANOMASTOIDECTOMY Right 6/30/2016    Procedure: GRAFT THIERSCH STATUS POST TYMPANOMASTOIDECTOMY;  Surgeon: Rishabh Boudreaux MD;  Location: [facility] OR     H ABLATION FOCAL AFIB  10/12/16     HC LAPAROSCOPY, SURGICAL; APPENDECTOMY  08/27/2007     JOINT REPLACEMENT, HIP RT/LT Right 8/12/14    Joint Replacement Hip RT/LT      MEATOPLASTY EAR Right 6/23/2016    Procedure: MEATOPLASTY EAR;  Surgeon: Rishabh Boudreaux MD;  Location: [facility] OR     MYRINGOTOMY Right 4/26/2016    Procedure: MYRINGOTOMY;  Surgeon: Evi Solorzano MD;  Location: [facility] OR     SHOULDER SURGERY Left 1/7/15    torn bicep, arthroplasty, rotator cuff     TYMPANOMASTOIDECTOMY Right 6/23/2016    Procedure: TYMPANOMASTOIDECTOMY;  Surgeon: Rishabh Boudreaux MD;  Location: [facility]     TYMPANOMASTOIDECTOMY WITH FACIAL MONITORING  12/13/2011    Procedure:TYMPANOMASTOIDECTOMY WITH FACIAL MONITORING; right tympanoplasty, mastoidectomy with facial nerve monitoring; Surgeon:EVI SOLORZANO; Location:[facility] OR     Current Outpatient Prescriptions   Medication Sig Dispense Refill     ACE/ARB NOT PRESCRIBED, INTENTIONAL, Please choose reason not prescribed, below       albuterol (VENTOLIN HFA) 108 (90 BASE) MCG/ACT Inhaler Inhale 2 puffs into the lungs every 4 hours as needed for shortness of breath / dyspnea or wheezing 1 Inhaler 1     cetirizine (ZYRTEC) 10 MG tablet Take 1 tablet (10 mg) by mouth 2 times daily 60 tablet 11     DiphenhydrAMINE HCl (BENADRYL PO) Reported on 5/14/2017       escitalopram (LEXAPRO) 10 MG tablet Take 1 tablet (10 mg) by mouth daily 90 tablet 3     flecainide acetate 150 MG TABS Take 1 tablet (150 mg) by mouth every 12 hours 180 tablet 3     nicotine polacrilex (NICORETTE) 4 MG gum Place 1 each (4 mg) inside cheek as needed for smoking cessation 50 tablet 3     simvastatin (ZOCOR) 5 MG tablet Take 1 tablet (5 mg) by mouth daily 90 tablet 3     tiZANidine (ZANAFLEX) 2 MG tablet Take 1 tablet (2 mg) by mouth 3 times daily 40 tablet 1     traZODone (DESYREL) 50 MG tablet Take 0.5-1 tablets (25-50 mg) by mouth nightly as needed for sleep 60 tablet 0     triamcinolone (KENALOG) 0.1 % cream Mix entire tube of Kenalog (triamincinolone cream) with 16 oz. Of Cera-Ve or Cetaphil lotion, KEEP REFRIGERATED 80 g 0     OTC products: None, except as noted  "above    Allergies   Allergen Reactions     Oxycodone Nausea and Vomiting      Latex Allergy: NO    Social History   Substance Use Topics     Smoking status: Current Every Day Smoker     Packs/day: 0.25     Years: 32.00     Types: Cigarettes     Smokeless tobacco: Never Used      Comment: 7 cigs a day     Alcohol use No     History   Drug Use No       REVIEW OF SYSTEMS:   CONSTITUTIONAL: NEGATIVE for fever, chills, change in weight  INTEGUMENTARY/SKIN: NEGATIVE for worrisome rashes, moles or lesions  EYES: NEGATIVE for vision changes or irritation  ENT/MOUTH: NEGATIVE for ear, mouth and throat problems  RESP: NEGATIVE for significant cough or SOB  CV: NEGATIVE for chest pain, palpitations or peripheral edema  GI: NEGATIVE for nausea, abdominal pain, heartburn, or change in bowel habits  : NEGATIVE for frequency, dysuria, or hematuria  MUSCULOSKELETAL: NEGATIVE for significant arthralgias or myalgia  NEURO: NEGATIVE for weakness, dizziness or paresthesias  ENDOCRINE: NEGATIVE for temperature intolerance, skin/hair changes  HEME: NEGATIVE for bleeding problems  PSYCHIATRIC: NEGATIVE for changes in mood or affect    EXAM:   /72 (BP Location: Right arm, Patient Position: Chair, Cuff Size: Adult Large)  Pulse 80  Temp 97.5  F (36.4  C) (Temporal)  Resp 16  Ht 5' 4\" (1.626 m)  Wt 228 lb (103.4 kg)  LMP 05/01/2009  SpO2 96%  BMI 39.14 kg/m2    GENERAL APPEARANCE: healthy, alert and no distress     EYES: EOMI, PERRL     HENT: ear canals and TM's normal and nose and mouth without ulcers or lesions     NECK: no adenopathy, no asymmetry, masses, or scars and thyroid normal to palpation     RESP: lungs clear to auscultation - no rales, rhonchi or wheezes     CV: regular rates and rhythm, normal S1 S2, no S3 or S4 and no murmur, click or rub     ABDOMEN:  soft, nontender, no HSM or masses and bowel sounds normal     MS: extremities normal- no gross deformities noted, no evidence of inflammation in joints, FROM in " all extremities.     SKIN: no suspicious lesions or rashes     NEURO: Normal strength and tone, sensory exam grossly normal, mentation intact and speech normal     PSYCH: mentation appears normal. and affect normal/bright     LYMPHATICS: No cervical adenopathy    DIAGNOSTICS:   EKG: reviewed from 11/2017--Normal Sinus Rhythm, normal axis, normal intervals, no acute ST/T changes c/w ischemia, no LVH by voltage criteria    Recent Labs   Lab Test  06/13/18   0952  03/10/17   1600  10/12/16   0700   HGB   --   14.0  13.7   PLT   --   221  205   INR   --    --   0.99   NA  142  142  142   POTASSIUM  4.7  3.9  4.0   CR  0.73  0.82  0.69        IMPRESSION:   Reason for surgery/procedure: stress incontinence    The proposed surgical procedure is considered INTERMEDIATE risk.    REVISED CARDIAC RISK INDEX  The patient has the following serious cardiovascular risks for perioperative complications such as (MI, PE, VFib and 3  AV Block):  No serious cardiac risks  INTERPRETATION: 0 risks: Class I (very low risk - 0.4% complication rate)    The patient has the following additional risks for perioperative complications:  No identified additional risks      ICD-10-CM    1. Preop general physical exam Z01.818    2. Stress incontinence N39.3    3. Pruritus L29.9 triamcinolone (KENALOG) 0.1 % cream   4. Dermatitis L30.9 triamcinolone (KENALOG) 0.1 % cream   5. Insomnia, unspecified type G47.00 traZODone (DESYREL) 50 MG tablet       RECOMMENDATIONS:       --Patient is to take all scheduled medications on the day of surgery     APPROVAL GIVEN to proceed with proposed procedure, without further diagnostic evaluation     She asks to renew Xanax to help with sleep, states usually uses 50 pills in a year.  Recommend to use something different than Xanax to help with sleep, will try trazodone.    Signed Electronically by: Jessy David MD    Copy of this evaluation report is provided to requesting physician.    [facility]  Guidelines    Revised Cardiac Risk Index

## 2018-08-19 ENCOUNTER — ANESTHESIA EVENT (OUTPATIENT)
Dept: SURGERY | Facility: CLINIC | Age: 68
End: 2018-08-19
Payer: MEDICARE

## 2018-08-19 ASSESSMENT — LIFESTYLE VARIABLES: TOBACCO_USE: 1

## 2018-08-19 ASSESSMENT — ENCOUNTER SYMPTOMS: DYSRHYTHMIAS: 1

## 2018-08-20 ENCOUNTER — HOSPITAL ENCOUNTER (OUTPATIENT)
Facility: CLINIC | Age: 68
Discharge: HOME | End: 2018-08-20
Attending: UROLOGY | Admitting: UROLOGY
Payer: MEDICARE

## 2018-08-20 ENCOUNTER — SURGERY (OUTPATIENT)
Age: 68
End: 2018-08-20

## 2018-08-20 ENCOUNTER — ANESTHESIA (OUTPATIENT)
Dept: SURGERY | Facility: CLINIC | Age: 68
End: 2018-08-20
Payer: MEDICARE

## 2018-08-20 VITALS
SYSTOLIC BLOOD PRESSURE: 114 MMHG | BODY MASS INDEX: 38.93 KG/M2 | RESPIRATION RATE: 12 BRPM | HEIGHT: 64 IN | WEIGHT: 228 LBS | OXYGEN SATURATION: 96 % | TEMPERATURE: 97.9 F | DIASTOLIC BLOOD PRESSURE: 59 MMHG

## 2018-08-20 DIAGNOSIS — N39.3 STRESS INCONTINENCE IN FEMALE: Primary | ICD-10-CM

## 2018-08-20 PROCEDURE — 25000125 ZZHC RX 250: Performed by: UROLOGY

## 2018-08-20 PROCEDURE — 25000566 ZZH SEVOFLURANE, EA 15 MIN: Performed by: UROLOGY

## 2018-08-20 PROCEDURE — 25000125 ZZHC RX 250: Performed by: NURSE ANESTHETIST, CERTIFIED REGISTERED

## 2018-08-20 PROCEDURE — 71000014 ZZH RECOVERY PHASE 1 LEVEL 2 FIRST HR: Performed by: UROLOGY

## 2018-08-20 PROCEDURE — A9270 NON-COVERED ITEM OR SERVICE: HCPCS | Mod: GY | Performed by: UROLOGY

## 2018-08-20 PROCEDURE — 36000056 ZZH SURGERY LEVEL 3 1ST 30 MIN: Performed by: UROLOGY

## 2018-08-20 PROCEDURE — 25000132 ZZH RX MED GY IP 250 OP 250 PS 637: Mod: GY | Performed by: UROLOGY

## 2018-08-20 PROCEDURE — 25000128 H RX IP 250 OP 636: Performed by: UROLOGY

## 2018-08-20 PROCEDURE — 71000027 ZZH RECOVERY PHASE 2 EACH 15 MINS: Performed by: UROLOGY

## 2018-08-20 PROCEDURE — 37000009 ZZH ANESTHESIA TECHNICAL FEE, EACH ADDTL 15 MIN: Performed by: UROLOGY

## 2018-08-20 PROCEDURE — 25000128 H RX IP 250 OP 636: Performed by: NURSE ANESTHETIST, CERTIFIED REGISTERED

## 2018-08-20 PROCEDURE — 27210794 ZZH OR GENERAL SUPPLY STERILE: Performed by: UROLOGY

## 2018-08-20 PROCEDURE — C1781 MESH (IMPLANTABLE): HCPCS | Performed by: UROLOGY

## 2018-08-20 PROCEDURE — 37000008 ZZH ANESTHESIA TECHNICAL FEE, 1ST 30 MIN: Performed by: UROLOGY

## 2018-08-20 PROCEDURE — 40000306 ZZH STATISTIC PRE PROC ASSESS II: Performed by: UROLOGY

## 2018-08-20 PROCEDURE — 36000058 ZZH SURGERY LEVEL 3 EA 15 ADDTL MIN: Performed by: UROLOGY

## 2018-08-20 DEVICE — MESH PROLENE 6X6" SOFT: Type: IMPLANTABLE DEVICE | Site: VAGINA | Status: FUNCTIONAL

## 2018-08-20 RX ORDER — PROPOFOL 10 MG/ML
INJECTION, EMULSION INTRAVENOUS PRN
Status: DISCONTINUED | OUTPATIENT
Start: 2018-08-20 | End: 2018-08-20

## 2018-08-20 RX ORDER — MEPERIDINE HYDROCHLORIDE 25 MG/ML
12.5 INJECTION INTRAMUSCULAR; INTRAVENOUS; SUBCUTANEOUS
Status: DISCONTINUED | OUTPATIENT
Start: 2018-08-20 | End: 2018-08-20 | Stop reason: HOSPADM

## 2018-08-20 RX ORDER — ONDANSETRON 4 MG/1
4 TABLET, ORALLY DISINTEGRATING ORAL EVERY 30 MIN PRN
Status: DISCONTINUED | OUTPATIENT
Start: 2018-08-20 | End: 2018-08-20 | Stop reason: HOSPADM

## 2018-08-20 RX ORDER — HYDROCODONE BITARTRATE AND ACETAMINOPHEN 5; 325 MG/1; MG/1
2 TABLET ORAL ONCE
Status: COMPLETED | OUTPATIENT
Start: 2018-08-20 | End: 2018-08-20

## 2018-08-20 RX ORDER — DIMENHYDRINATE 50 MG/ML
25 INJECTION, SOLUTION INTRAMUSCULAR; INTRAVENOUS
Status: DISCONTINUED | OUTPATIENT
Start: 2018-08-20 | End: 2018-08-20 | Stop reason: HOSPADM

## 2018-08-20 RX ORDER — HYDROCODONE BITARTRATE AND ACETAMINOPHEN 5; 325 MG/1; MG/1
1 TABLET ORAL EVERY 4 HOURS PRN
Qty: 18 TABLET | Refills: 0 | Status: SHIPPED | OUTPATIENT
Start: 2018-08-20 | End: 2019-08-23

## 2018-08-20 RX ORDER — LIDOCAINE 40 MG/G
CREAM TOPICAL
Status: DISCONTINUED | OUTPATIENT
Start: 2018-08-20 | End: 2018-08-20 | Stop reason: HOSPADM

## 2018-08-20 RX ORDER — NALOXONE HYDROCHLORIDE 0.4 MG/ML
.1-.4 INJECTION, SOLUTION INTRAMUSCULAR; INTRAVENOUS; SUBCUTANEOUS
Status: DISCONTINUED | OUTPATIENT
Start: 2018-08-20 | End: 2018-08-20 | Stop reason: HOSPADM

## 2018-08-20 RX ORDER — CEPHALEXIN 500 MG/1
500 CAPSULE ORAL 3 TIMES DAILY
Qty: 15 CAPSULE | Refills: 0 | Status: SHIPPED | OUTPATIENT
Start: 2018-08-20 | End: 2018-08-25

## 2018-08-20 RX ORDER — ONDANSETRON 2 MG/ML
INJECTION INTRAMUSCULAR; INTRAVENOUS PRN
Status: DISCONTINUED | OUTPATIENT
Start: 2018-08-20 | End: 2018-08-20

## 2018-08-20 RX ORDER — SODIUM CHLORIDE, SODIUM LACTATE, POTASSIUM CHLORIDE, CALCIUM CHLORIDE 600; 310; 30; 20 MG/100ML; MG/100ML; MG/100ML; MG/100ML
INJECTION, SOLUTION INTRAVENOUS CONTINUOUS
Status: DISCONTINUED | OUTPATIENT
Start: 2018-08-20 | End: 2018-08-20 | Stop reason: HOSPADM

## 2018-08-20 RX ORDER — LIDOCAINE HYDROCHLORIDE AND EPINEPHRINE 10; 10 MG/ML; UG/ML
INJECTION, SOLUTION INFILTRATION; PERINEURAL PRN
Status: DISCONTINUED | OUTPATIENT
Start: 2018-08-20 | End: 2018-08-20 | Stop reason: HOSPADM

## 2018-08-20 RX ORDER — FENTANYL CITRATE 50 UG/ML
INJECTION, SOLUTION INTRAMUSCULAR; INTRAVENOUS PRN
Status: DISCONTINUED | OUTPATIENT
Start: 2018-08-20 | End: 2018-08-20

## 2018-08-20 RX ORDER — LIDOCAINE HYDROCHLORIDE 20 MG/ML
INJECTION, SOLUTION INFILTRATION; PERINEURAL PRN
Status: DISCONTINUED | OUTPATIENT
Start: 2018-08-20 | End: 2018-08-20

## 2018-08-20 RX ORDER — CEFAZOLIN SODIUM 2 G/100ML
2 INJECTION, SOLUTION INTRAVENOUS
Status: COMPLETED | OUTPATIENT
Start: 2018-08-20 | End: 2018-08-20

## 2018-08-20 RX ORDER — FENTANYL CITRATE 50 UG/ML
25-50 INJECTION, SOLUTION INTRAMUSCULAR; INTRAVENOUS
Status: DISCONTINUED | OUTPATIENT
Start: 2018-08-20 | End: 2018-08-20 | Stop reason: HOSPADM

## 2018-08-20 RX ORDER — ONDANSETRON 2 MG/ML
4 INJECTION INTRAMUSCULAR; INTRAVENOUS EVERY 30 MIN PRN
Status: DISCONTINUED | OUTPATIENT
Start: 2018-08-20 | End: 2018-08-20 | Stop reason: HOSPADM

## 2018-08-20 RX ORDER — DEXAMETHASONE SODIUM PHOSPHATE 10 MG/ML
INJECTION, SOLUTION INTRAMUSCULAR; INTRAVENOUS PRN
Status: DISCONTINUED | OUTPATIENT
Start: 2018-08-20 | End: 2018-08-20

## 2018-08-20 RX ADMIN — FENTANYL CITRATE 25 MCG: 50 INJECTION, SOLUTION INTRAMUSCULAR; INTRAVENOUS at 07:38

## 2018-08-20 RX ADMIN — SODIUM CHLORIDE, POTASSIUM CHLORIDE, SODIUM LACTATE AND CALCIUM CHLORIDE: 600; 310; 30; 20 INJECTION, SOLUTION INTRAVENOUS at 09:13

## 2018-08-20 RX ADMIN — HYDROCODONE BITARTRATE AND ACETAMINOPHEN 1 TABLET: 5; 325 TABLET ORAL at 10:08

## 2018-08-20 RX ADMIN — PHENYLEPHRINE HYDROCHLORIDE 100 MCG: 10 INJECTION, SOLUTION INTRAMUSCULAR; INTRAVENOUS; SUBCUTANEOUS at 08:16

## 2018-08-20 RX ADMIN — FENTANYL CITRATE 25 MCG: 50 INJECTION INTRAMUSCULAR; INTRAVENOUS at 09:05

## 2018-08-20 RX ADMIN — FENTANYL CITRATE 25 MCG: 50 INJECTION, SOLUTION INTRAMUSCULAR; INTRAVENOUS at 08:03

## 2018-08-20 RX ADMIN — MIDAZOLAM 1 MG: 1 INJECTION INTRAMUSCULAR; INTRAVENOUS at 07:33

## 2018-08-20 RX ADMIN — CEFAZOLIN 20 ML: 1 INJECTION, POWDER, FOR SOLUTION INTRAMUSCULAR; INTRAVENOUS at 08:21

## 2018-08-20 RX ADMIN — LIDOCAINE HYDROCHLORIDE 40 MG: 20 INJECTION, SOLUTION INFILTRATION; PERINEURAL at 07:39

## 2018-08-20 RX ADMIN — PHENYLEPHRINE HYDROCHLORIDE 50 MCG: 10 INJECTION, SOLUTION INTRAMUSCULAR; INTRAVENOUS; SUBCUTANEOUS at 08:10

## 2018-08-20 RX ADMIN — ONDANSETRON 4 MG: 2 INJECTION INTRAMUSCULAR; INTRAVENOUS at 07:59

## 2018-08-20 RX ADMIN — LIDOCAINE HYDROCHLORIDE AND EPINEPHRINE 20 ML: 10; 10 INJECTION, SOLUTION INFILTRATION; PERINEURAL at 08:22

## 2018-08-20 RX ADMIN — SODIUM CHLORIDE, POTASSIUM CHLORIDE, SODIUM LACTATE AND CALCIUM CHLORIDE: 600; 310; 30; 20 INJECTION, SOLUTION INTRAVENOUS at 07:09

## 2018-08-20 RX ADMIN — PHENYLEPHRINE HYDROCHLORIDE 50 MCG: 10 INJECTION, SOLUTION INTRAMUSCULAR; INTRAVENOUS; SUBCUTANEOUS at 08:08

## 2018-08-20 RX ADMIN — DEXAMETHASONE SODIUM PHOSPHATE 10 MG: 10 INJECTION, SOLUTION INTRAMUSCULAR; INTRAVENOUS at 07:54

## 2018-08-20 RX ADMIN — PROPOFOL 150 MG: 10 INJECTION, EMULSION INTRAVENOUS at 07:39

## 2018-08-20 RX ADMIN — CEFAZOLIN SODIUM 2 G: 2 INJECTION, SOLUTION INTRAVENOUS at 07:33

## 2018-08-20 RX ADMIN — LIDOCAINE HYDROCHLORIDE 1 ML: 10 INJECTION, SOLUTION EPIDURAL; INFILTRATION; INTRACAUDAL; PERINEURAL at 07:09

## 2018-08-20 NOTE — IP AVS SNAPSHOT
MRN:[id number]                      After Visit Summary   8/20/2018    Tiesha Gurrola    MRN: [id number]           Thank you!     Thank you for choosing [facility] for your care. Our goal is always to provide you with excellent care. Hearing back from our patients is one way we can continue to improve our services. Please take a few minutes to complete the written survey that you may receive in the mail after you visit with us. Thank you!        Patient Information     Date Of Birth          1950        About your hospital stay     You were admitted on:  August 20, 2018 You last received care in the:  [facility]    You were discharged on:  August 20, 2018       Who to Call     For medical emergencies, please call 911.  For non-urgent questions about your medical care, please call your primary care provider or clinic, [phone]  For questions related to your surgery, please call your surgery clinic        Attending Provider     Provider Kamari Palomino MD Urology       Primary Care Provider Office Phone # Fax #    Jessy MOE MD Joann [phone]      After Care Instructions     Diet Instructions       Resume pre procedure diet            Discharge Instructions       Patient to arrange for follow up appointment in 1 week for post void residual check.  [phone] to schedule.            No lifting over 15 pounds and no strenuous physical activity       for 1 week.  No immersion in water and no intercourse/tampon for one month                  Your next 10 appointments already scheduled     Aug 30, 2018  1:30 PM CDT   New Sleep Patient with Jake Solorzano MD   [facility] ([facility])    [address] [place] [id number] [phone]              Further instructions from your care team       Discharge Instructions Following Pubovaginal Sling  Dr Kamari Sexton     Observe the  following precautions upon leaving the hospital:     1. Empty your bladder when you feel the desire to pass urine. Do not attempt to hold the urine after the bladder feels full.     2. Avoid the following:  -Heavy lifting (over 15 pounds)  -Running  -Straining of any kind, particularly at bowel movement time.  If the bowels become constipated, take one ounce of Milk of Magnesia, night and morning, until the stool becomes normal or softer than normal.  This may take several days.     3. If you pass bloody urine or small blood clots, simply decrease physical activity and drink much more fluid.  Generally, the urine will clear of blood within a day or two.  If not, contact our office.  You may expect to have some blood for at least 3-4 weeks, particularly at the beginning or end of urination.  If there is dark, thick blood with difficulty urinating, call our office at [phone].     4. Do not have intercourse until you have been advised that it is safe.     5. Return to my office 1 week from surgery.  Please make an appointment in advance by calling [phone].    Same-Day Surgery Anesthesia  Adult Discharge Orders & Instructions     For 24 hours after surgery    1. Get plenty of rest.  A responsible adult must stay with you for at least 24 hours after you leave the hospital.   2. Do not drive or use heavy equipment.  If you have weakness or tingling, don't drive or use heavy equipment until this feeling goes away.  3. Do not drink alcohol.  4. Avoid strenuous or risky activities.  Ask for help when climbing stairs.   5. You may feel lightheaded.  If so, sit for a few minutes before standing.  Have someone help you get up.   6. You may have a slight fever. Call the doctor if your fever is over 100 F (37.7 C) (taken under the tongue) or lasts longer than 24 hours.  7. You may have a dry mouth, a sore throat, muscle aches or trouble sleeping.  These should go away after 24 hours.  8. Do not make important or legal  decisions.  Based on the surgery/procedure that you had today, we do not expect that you will have any problems.  However, we want you to know what to do if you have pain, nausea, bleeding,or infection:  To control pain:  Take medicines your physician has prescribed or or over-the counter medicine he or she advises.  Ice packs and periods of rest are often helpful.  For surgery on an arm or leg, raise it on a pillow to ease swelling.  If your pain is not managed with the above methods, contact your physician.  To control nausea:  Take anti-nausea medicine approved by your physician.  Drink clear liquids such as apple juice, ginger ale, broth or 7-Up. Be sure to drink enough fluids.  Move to a regular diet as you feel able.  Rest may also help.  Bleeding:  You may see a little blood on your dressing, about the size of a quarter in the first 24 hours.  If you see this, there is no reason to be alarmed.  However, if this continues to increase in size, apply pressure if able, and notify your physician.  Infection: Please contact your physician if you have any of the following signs:  redness, swelling, heat, increasing pain or foul-smelling drainage at your surgery site, fever or chills.    Call your doctor for any of the followin.  It has been over 8 to 10 hours since surgery and you are still not able to urinate (pass water).    2.  Headache for over 24 hours.    Nurse advice line: [phone]  Pain management:    You have been prescribed Norco to help manage your surgical pain.  This medication does contain Tylenol/Acetaminophen.  You should not add in Tylenol/Acetaminophen until you are weaning off the prescribed pain pills.  The maximum dosage of Tylenol/Acetaminophen is 4000 mg in 24 hours.    When you are weaning off the pain pills start replacing the Norco with Tylenol/Acetaminophen if needed.  The dosage for Tylenol/Acetaminophen is 1-2 tabs (325 mg each) every 4 to 6 hours.    If you tolerate  "Ibuprofen, Motrin or Advil, take 600 mg every 6 hours as needed in place of the pain pills or for additional pain control.  You have also been prescribed oral antibiotic.  Start that antibiotic later today after eating.  Try to get 2 doses of oral antibiotic in today.  Start 3 doses a day tomorrow and finish full course of antibiotic.      Pending Results     No orders found from 8/18/2018 to 8/21/2018.            Admission Information     Date & Time Provider Department Dept. Phone    8/20/2018 Kamari Sexton MD [facility] Phase [phone]      Your Vitals Were     Blood Pressure Temperature Respirations Height Weight Last Period    119/69 97.9  F (36.6  C) (Oral) 12 1.626 m (5' 4.02\") 103.4 kg (228 lb) 05/01/2009    Pulse Oximetry BMI (Body Mass Index)                96% 39.12 kg/m2          Care EveryWhere ID     This is your Care EveryWhere ID. This could be used by other organizations to access your [facility] medical records  [id number]        Equal Access to Services     ADALID HIDALGO AH: Hadii dominik bhaktao Sovero, waaxda luqadaha, qaybta kaalmada adeegyayoli, judith mejia . So [facility] [phone].    ATENCIÓN: Si habla español, tiene a mahoney disposición servicios gratuitos de asistencia lingüística. Llame al [phone].    We comply with applicable federal civil rights laws and [place] laws. We do not discriminate on the basis of race, color, national origin, age, disability, sex, sexual orientation, or gender identity.               Review of your medicines      START taking        Dose / Directions    cephALEXin 500 MG capsule   Commonly known as:  KEFLEX   Used for:  Stress incontinence in female        Dose:  500 mg   Take 1 capsule (500 mg) by mouth 3 times daily for 5 days   Quantity:  15 capsule   Refills:  0       HYDROcodone-acetaminophen 5-325 MG per tablet   Commonly known as:  NORCO   Used for:  Stress incontinence in female        Dose:  1 tablet "   Take 1 tablet by mouth every 4 hours as needed for pain   Quantity:  18 tablet   Refills:  0         CONTINUE these medicines which have NOT CHANGED        Dose / Directions    ACE/ARB/ARNI NOT PRESCRIBED (INTENTIONAL)   Used for:  Major depressive disorder, recurrent episode, moderate (H)        Please choose reason not prescribed, below   Refills:  0       albuterol 108 (90 Base) MCG/ACT inhaler   Commonly known as:  VENTOLIN HFA   Used for:  SOB (shortness of breath)        Dose:  2 puff   Inhale 2 puffs into the lungs every 4 hours as needed for shortness of breath / dyspnea or wheezing   Quantity:  1 Inhaler   Refills:  1       cetirizine 10 MG tablet   Commonly known as:  zyrTEC   Used for:  Pruritic disorder        Dose:  10 mg   Take 1 tablet (10 mg) by mouth 2 times daily   Quantity:  60 tablet   Refills:  11       escitalopram 10 MG tablet   Commonly known as:  LEXAPRO   Used for:  Major depressive disorder, recurrent episode, moderate (H)        Dose:  10 mg   Take 1 tablet (10 mg) by mouth daily   Quantity:  90 tablet   Refills:  3       flecainide acetate 150 MG Tabs   Used for:  Paroxysmal atrial fibrillation (H)        Dose:  1 tablet   Take 1 tablet (150 mg) by mouth every 12 hours   Quantity:  180 tablet   Refills:  3       hydrOXYzine 25 MG tablet   Commonly known as:  ATARAX   Used for:  Insomnia, unspecified type        Dose:  25-50 mg   Take 1-2 tablets (25-50 mg) by mouth nightly as needed   Quantity:  60 tablet   Refills:  0       simvastatin 5 MG tablet   Commonly known as:  ZOCOR   Used for:  Hyperlipidemia, unspecified hyperlipidemia type        Dose:  5 mg   Take 1 tablet (5 mg) by mouth daily   Quantity:  90 tablet   Refills:  3       triamcinolone 0.1 % cream   Commonly known as:  KENALOG   Used for:  Pruritus, Dermatitis        Apply twice daily as needed   Quantity:  80 g   Refills:  0            Where to get your medicines      Some of these will need a paper prescription and others  can be bought over the counter. Ask your nurse if you have questions.     Bring a paper prescription for each of these medications     cephALEXin 500 MG capsule    HYDROcodone-acetaminophen 5-325 MG per tablet                Protect others around you: Learn how to safely use, store and throw away your medicines at www.disposemymeds.org.        ANTIBIOTIC INSTRUCTION     You've Been Prescribed an Antibiotic - Now What?  Your healthcare team thinks that you or your loved one might have an infection. Some infections can be treated with antibiotics, which are powerful, life-saving drugs. Like all medications, antibiotics have side effects and should only be used when necessary. There are some important things you should know about your antibiotic treatment.      Your healthcare team may run tests before you start taking an antibiotic.    Your team may take samples (e.g., from your blood, urine or other areas) to run tests to look for bacteria. These test can be important to determine if you need an antibiotic at all and, if you do, which antibiotic will work best.      Within a few days, your healthcare team might change or even stop your antibiotic.    Your team may start you on an antibiotic while they are working to find out what is making you sick.    Your team might change your antibiotic because test results show that a different antibiotic would be better to treat your infection.    In some cases, once your team has more information, they learn that you do not need an antibiotic at all. They may find out that you don't have an infection, or that the antibiotic you're taking won't work against your infection. For example, an infection caused by a virus can't be treated with antibiotics. Staying on an antibiotic when you don't need it is more likely to be harmful than helpful.      You may experience side effects from your antibiotic.    Like all medications, antibiotics have side effects. Some of these can be  serious.    Let you healthcare team know if you have any known allergies when you are admitted to the hospital.    One significant side effect of nearly all antibiotics is the risk of severe and sometimes deadly diarrhea caused by Clostridium difficile (C. Difficile). This occurs when a person takes antibiotics because some good germs are destroyed. Antibiotic use allows C. diificile to take over, putting patients at high risk for this serious infection.    As a patient or caregiver, it is important to understand your or your loved one's antibiotic treatment. It is especially important for caregivers to speak up when patients can't speak for themselves. Here are some important questions to ask your healthcare team.    What infection is this antibiotic treating and how do you know I have that infection?    What side effects might occur from this antibiotic?    How long will I need to take this antibiotic?    Is it safe to take this antibiotic with other medications or supplements (e.g., vitamins) that I am taking?     Are there any special directions I need to know about taking this antibiotic? For example, should I take it with food?    How will I be monitored to know whether my infection is responding to the antibiotic?    What tests may help to make sure the right antibiotic is prescribed for me?      Information provided by:  www.cdc.gov/getsmart  U.S. Department of Health and Human Services  Centers for disease Control and Prevention  National Center for Emerging and Zoonotic Infectious Diseases  Division of Healthcare Quality Promotion        Information about OPIOIDS     PRESCRIPTION OPIOIDS: WHAT YOU NEED TO KNOW   We gave you an opioid (narcotic) pain medicine. It is important to manage your pain, but opioids are not always the best choice. You should first try all the other options your care team gave you. Take this medicine for as short a time (and as few doses) as possible.    Some activities can increase  your pain, such as bandage changes or therapy sessions. It may help to take your pain medicine 30 to 60 minutes before these activities. Reduce your stress by getting enough sleep, working on hobbies you enjoy and practicing relaxation or meditation. Talk to your care team about ways to manage your pain beyond prescription opioids.    These medicines have risks:    DO NOT drive when on new or higher doses of pain medicine. These medicines can affect your alertness and reaction times, and you could be arrested for driving under the influence (DUI). If you need to use opioids long-term, talk to your care team about driving.    DO NOT operate heavy machinery    DO NOT do any other dangerous activities while taking these medicines.    DO NOT drink any alcohol while taking these medicines.     If the opioid prescribed includes acetaminophen, DO NOT take with any other medicines that contain acetaminophen. Read all labels carefully. Look for the word  acetaminophen  or  Tylenol.  Ask your pharmacist if you have questions or are unsure.    You can get addicted to pain medicines, especially if you have a history of addiction (chemical, alcohol or substance dependence). Talk to your care team about ways to reduce this risk.    All opioids tend to cause constipation. Drink plenty of water and eat foods that have a lot of fiber, such as fruits, vegetables, prune juice, apple juice and high-fiber cereal. Take a laxative (Miralax, milk of magnesia, Colace, Senna) if you don t move your bowels at least every other day. Other side effects include upset stomach, sleepiness, dizziness, throwing up, tolerance (needing more of the medicine to have the same effect), physical dependence and slowed breathing.    Store your pills in a secure place, locked if possible. We will not replace any lost or stolen medicine. If you don t finish your medicine, please throw away (dispose) as directed by your pharmacist. The [place] Pollution  Control Agency has more information about safe disposal: https://www.pca.[facility].[place].us/living-green/managing-unwanted-medications             Medication List: This is a list of all your medications and when to take them. Check marks below indicate your daily home schedule. Keep this list as a reference.      Medications           Morning Afternoon Evening Bedtime As Needed    ACE/ARB/ARNI NOT PRESCRIBED (INTENTIONAL)   Please choose reason not prescribed, below                                albuterol 108 (90 Base) MCG/ACT inhaler   Commonly known as:  VENTOLIN HFA   Inhale 2 puffs into the lungs every 4 hours as needed for shortness of breath / dyspnea or wheezing                                cephALEXin 500 MG capsule   Commonly known as:  KEFLEX   Take 1 capsule (500 mg) by mouth 3 times daily for 5 days                                cetirizine 10 MG tablet   Commonly known as:  zyrTEC   Take 1 tablet (10 mg) by mouth 2 times daily                                escitalopram 10 MG tablet   Commonly known as:  LEXAPRO   Take 1 tablet (10 mg) by mouth daily                                flecainide acetate 150 MG Tabs   Take 1 tablet (150 mg) by mouth every 12 hours                                HYDROcodone-acetaminophen 5-325 MG per tablet   Commonly known as:  NORCO   Take 1 tablet by mouth every 4 hours as needed for pain                                hydrOXYzine 25 MG tablet   Commonly known as:  ATARAX   Take 1-2 tablets (25-50 mg) by mouth nightly as needed                                simvastatin 5 MG tablet   Commonly known as:  ZOCOR   Take 1 tablet (5 mg) by mouth daily                                triamcinolone 0.1 % cream   Commonly known as:  KENALOG   Apply twice daily as needed

## 2018-08-20 NOTE — ANESTHESIA POSTPROCEDURE EVALUATION
Patient: Tiesha Gurrola    Procedure(s):  cystoscopy, mid uretheral sling - Wound Class: II-Clean Contaminated   - Wound Class: II-Clean Contaminated    Diagnosis:stress/urge incontinence  Diagnosis Additional Information: No value filed.    Anesthesia Type:  General, LMA    Note:  Anesthesia Post Evaluation    Patient location during evaluation: PACU and Bedside  Patient participation: Able to fully participate in evaluation  Level of consciousness: responsive to verbal stimuli  Pain management: satisfactory to patient  Airway patency: patent  Cardiovascular status: stable  Respiratory status: acceptable  Hydration status: stable  PONV: none     Anesthetic complications: None    Comments: Phase II RN - Pt had no additional issues/questions or concerns regarding anesthesia prior to discharge.        Last vitals:  Vitals:    08/20/18 0931 08/20/18 0945 08/20/18 1000   BP: 119/69 126/72 114/59   Resp: 12     Temp:      SpO2:            Electronically Signed By: NAVI Nelson CRNA  August 20, 2018  1:49 PM

## 2018-08-20 NOTE — IP AVS SNAPSHOT
[facility] Phase II    [address]     [place] [address]    Phone:  [phone]                                       After Visit Summary   8/20/2018    Tiesha Gurrola    MRN: [id number]           After Visit Summary Signature Page     I have received my discharge instructions, and my questions have been answered. I have discussed any challenges I see with this plan with the nurse or doctor.    ..........................................................................................................................................  Patient/Patient Representative Signature      ..........................................................................................................................................  Patient Representative Print Name and Relationship to Patient    ..................................................               ................................................  Date                                            Time    ..........................................................................................................................................  Reviewed by Signature/Title    ...................................................              ..............................................  Date                                                            Time

## 2018-08-20 NOTE — BRIEF OP NOTE
[facility] Urology Brief Operative Note    Pre-operative diagnosis: stress/urge incontinence   Post-operative diagnosis: Same   Procedure: Procedure(s):  CYSTOSCOPY  SLING TRANSVAGINAL   Surgeon: Kamari Sexton MD, MD   Assistant(s): none   Anesthesia: LMA   Estimated blood loss: Less than 50 ml   Total IV fluids: (See anesthesia record)   Blood transfusion: No transfusion was given during surgery   Total urine output: Not measured   Drains: None   Specimens: None   Implants: soft prolene mesh sling   Findings: See dictation.   Complications: None   Condition: Stable   Comments: See dictated operative report for full details.    Kamari Sexton MD

## 2018-08-20 NOTE — OP NOTE
Procedure Date: 08/20/2018      NAME OF PROCEDURE:  Mid-urethral sling, cystoscopy.      PREOPERATIVE DIAGNOSIS:  Female stress incontinence.      POSTOPERATIVE DIAGNOSIS:  Female stress incontinence.      OPERATIVE NOTE:  Informed consent was obtained from the patient.  She was brought to the operating room, given laryngeal mask anesthesia and placed in lithotomy position.  Sterile prep and drape were applied and surgical timeout was taken.  A Alex catheter was placed into the bladder and the bladder was drained completely.  A weighted vaginal speculum was then placed to facilitate exposure.  Anterior vaginal submucosal injection of 1% lidocaine with epinephrine was accomplished to facilitate dissection.  A small transverse incision was made over the mid urethra.  Dissection carried a short distance distally and then proximally to create a space for the sling to be placed.  Dissection was then carried laterally to the levator fascia on each side.  The bladder was drained again and then Stamey needles were passed from 2 small puncture wounds in the suprapubic area approximately 2-3 fingerbreadths lateral to the midline on each side.  These were placed under finger guidance on each side.  Alex catheter was removed and cystoscopy was performed and demonstrated no evidence of bladder or urethral perforation by the Stamey needles.  The bladder was left full, and then clamped Alex catheter was placed back into the bladder.  A 1 cm x 15 cm soft Prolene mesh with 2-0 Vicryls attached to each end were then brought into position using the Stamey needles and tensioned so that Metzenbaum scissors passed easily from side-to-side between the sling and the urethra.  The vaginal wound was irrigated with antibiotic containing solution and then closed with a running 2-0 Vicryl.  The right suprapubic Vicryl was then brought subcutaneously to the left suprapubic puncture site using a Stamey needle and these were tied loosely  together in the subcu.  Dermabond was applied to these wounds.  The Alex catheter was removed, the speculum was removed and the procedure was terminated at that point.  She tolerated the procedure well.  There were no complications.  Estimated blood loss 25 mL or less.  She will void before discharge and follow up in clinic in 1 weeks' time for a postvoid residual check.  She will be going out on Keflex t.i.d. x 5 days and Norco p.r.n. for pain.         JACQUELYN CAMPBELL MD             D: 2018   T: 2018   MT: KHARI      Name:     DESHAWN ROSEN   MRN:      [id number]        Account:        [id number]   :      1950           Procedure Date: 2018      Document: [id number]

## 2018-08-20 NOTE — DISCHARGE INSTRUCTIONS
Discharge Instructions Following Pubovaginal Sling  Dr Kamari Sexton     Observe the following precautions upon leaving the hospital:     1. Empty your bladder when you feel the desire to pass urine. Do not attempt to hold the urine after the bladder feels full.     2. Avoid the following:  -Heavy lifting (over 15 pounds)  -Running  -Straining of any kind, particularly at bowel movement time.  If the bowels become constipated, take one ounce of Milk of Magnesia, night and morning, until the stool becomes normal or softer than normal.  This may take several days.     3. If you pass bloody urine or small blood clots, simply decrease physical activity and drink much more fluid.  Generally, the urine will clear of blood within a day or two.  If not, contact our office.  You may expect to have some blood for at least 3-4 weeks, particularly at the beginning or end of urination.  If there is dark, thick blood with difficulty urinating, call our office at [phone].     4. Do not have intercourse until you have been advised that it is safe.     5. Return to my office 1 week from surgery.  Please make an appointment in advance by calling [phone].    Same-Day Surgery Anesthesia  Adult Discharge Orders & Instructions     For 24 hours after surgery    1. Get plenty of rest.  A responsible adult must stay with you for at least 24 hours after you leave the hospital.   2. Do not drive or use heavy equipment.  If you have weakness or tingling, don't drive or use heavy equipment until this feeling goes away.  3. Do not drink alcohol.  4. Avoid strenuous or risky activities.  Ask for help when climbing stairs.   5. You may feel lightheaded.  If so, sit for a few minutes before standing.  Have someone help you get up.   6. You may have a slight fever. Call the doctor if your fever is over 100 F (37.7 C) (taken under the tongue) or lasts longer than 24 hours.  7. You may have a dry mouth, a sore throat, muscle aches or trouble  sleeping.  These should go away after 24 hours.  8. Do not make important or legal decisions.  Based on the surgery/procedure that you had today, we do not expect that you will have any problems.  However, we want you to know what to do if you have pain, nausea, bleeding,or infection:  To control pain:  Take medicines your physician has prescribed or or over-the counter medicine he or she advises.  Ice packs and periods of rest are often helpful.  For surgery on an arm or leg, raise it on a pillow to ease swelling.  If your pain is not managed with the above methods, contact your physician.  To control nausea:  Take anti-nausea medicine approved by your physician.  Drink clear liquids such as apple juice, ginger ale, broth or 7-Up. Be sure to drink enough fluids.  Move to a regular diet as you feel able.  Rest may also help.  Bleeding:  You may see a little blood on your dressing, about the size of a quarter in the first 24 hours.  If you see this, there is no reason to be alarmed.  However, if this continues to increase in size, apply pressure if able, and notify your physician.  Infection: Please contact your physician if you have any of the following signs:  redness, swelling, heat, increasing pain or foul-smelling drainage at your surgery site, fever or chills.    Call your doctor for any of the followin.  It has been over 8 to 10 hours since surgery and you are still not able to urinate (pass water).    2.  Headache for over 24 hours.    Nurse advice line: [phone]  Pain management:    You have been prescribed Norco to help manage your surgical pain.  This medication does contain Tylenol/Acetaminophen.  You should not add in Tylenol/Acetaminophen until you are weaning off the prescribed pain pills.  The maximum dosage of Tylenol/Acetaminophen is 4000 mg in 24 hours.    When you are weaning off the pain pills start replacing the Norco with Tylenol/Acetaminophen if needed.  The dosage for  Tylenol/Acetaminophen is 1-2 tabs (325 mg each) every 4 to 6 hours.    If you tolerate Ibuprofen, Motrin or Advil, take 600 mg every 6 hours as needed in place of the pain pills or for additional pain control.  You have also been prescribed oral antibiotic.  Start that antibiotic later today after eating.  Try to get 2 doses of oral antibiotic in today.  Start 3 doses a day tomorrow and finish full course of antibiotic.

## 2018-08-20 NOTE — ANESTHESIA CARE TRANSFER NOTE
Patient: Tiesha Gurrola    Procedure(s):  cystoscopy, mid uretheral sling - Wound Class: II-Clean Contaminated   - Wound Class: II-Clean Contaminated    Diagnosis: stress/urge incontinence  Diagnosis Additional Information: No value filed.    Anesthesia Type:   General, LMA     Note:  Airway :Nasal Cannula  Patient transferred to:PACU  Comments: To PACU after LMA D/Cd.  Dentition intact/atraumatic.  Report to RN VSS Respiratory status stable.Handoff Report: Identifed the Patient, Identified the Reponsible Provider, Reviewed the pertinent medical history, Discussed the surgical course, Reviewed Intra-OP anesthesia mangement and issues during anesthesia, Set expectations for post-procedure period and Allowed opportunity for questions and acknowledgement of understanding      Vitals: (Last set prior to Anesthesia Care Transfer)    CRNA VITALS  8/20/2018 0759 - 8/20/2018 0834      8/20/2018             Pulse: 67    SpO2: 100 %                Electronically Signed By: NAVI Nelson CRNA  August 20, 2018  8:34 AM

## 2018-08-20 NOTE — ANESTHESIA PREPROCEDURE EVALUATION
Anesthesia Evaluation     . Pt has had prior anesthetic. Type: General           ROS/MED HX    ENT/Pulmonary:     (+)RENU risk factors (Plans to get sleep consult in September 2018) obese, observed stopped breathing other ENT- Previous ear surgeries, tobacco use, Current use , . .    Neurologic:     (+)migraines,     Cardiovascular:     (+) Dyslipidemia, ----. : . . . :. dysrhythmias (ablation 10/11/2016) a-fib and 1st Deg Heart Block, Irregular Heartbeat/Palpitations, valvular problems/murmurs Mitral valve disorder:.       METS/Exercise Tolerance:  >4 METS   Hematologic:     (+) History of Transfusion no previous transfusion reaction -      Musculoskeletal:   (+) arthritis, , , -       GI/Hepatic:  - neg GI/hepatic ROS       Renal/Genitourinary:     (+) Other Renal/ Genitourinary, Cysto sling 8/20/18      Endo:     (+) Obesity, .      Psychiatric:     (+) psychiatric history anxiety and depression      Infectious Disease:  - neg infectious disease ROS       Malignancy:      - no malignancy   Other:    - neg other ROS                 Physical Exam      Airway   Mallampati: II  TM distance: >3 FB  Neck ROM: limited    Dental   (+) chipped    Cardiovascular   Rhythm and rate: regular and normal    PE comment: Distant    Pulmonary    breath sounds clear to auscultation                    Anesthesia Plan      History & Physical Review  History and physical reviewed and following examination; no interval change.    ASA Status:  3 .    NPO Status:  > 8 hours    Plan for General and LMA with Intravenous and Propofol induction. Maintenance will be Inhalation and Balanced.    PONV prophylaxis:  Ondansetron (or other 5HT-3) and Dexamethasone or Solumedrol       Postoperative Care  Postoperative pain management:  IV analgesics and Oral pain medications.      Consents  Anesthetic plan, risks, benefits and alternatives discussed with:  Patient.  Use of blood products discussed: Yes.   Use of blood products discussed with  Patient.  Consented to blood products.  .                          .

## 2018-08-21 NOTE — OR NURSING
[facility] Same Day Surgery  Discharge Call Back  Tiesha Gurrola  1950  MRN: [id number]  Home: [phone] (home) none (work)  PCP: Jessy David    We are calling to see how you're doing since your surgery/procedure with us?   Comments:   Clinical Questions  1. Have you had time to look at your discharge instructions? Do you have any questions in regards to the instructions?   Comment:  2. Do you feel your pain is being controlled with the regimen the surgeon sent you home on? (ie: prescription medications, over the counter pain medications, ice packs)   Comments: some discomfort  3. Have you noticed any drainage on your dressing? Do you know what to do if you have bleeding as a result of your procedure?   Comments: minimal   4. Have you had any nausea/vomiting? Do you know how to treat this?   Comment: none  5. Have you had any signs/symptoms of infection? (ie: fever, swelling, heat, drainage or redness) Do you know what to do if you have?   Comment: none  6. Do you have a follow up appointment made with your surgeon? Do you have a number to contact them at if you need it?   Comment: none  Retained Foreign Object (HUA, Hemovac, Penrose, Wound Packing, Vaginal Packing, Nasal Splints, Urethral Stents, Alex Catheter)  1. Do you still have none in place?   2. If the item is still in place, can you review the plan for removal with me? none  Recognition  Is there anyone from your care team that you would like to recognize?   Comments: NA  Improvement  Our goal is to be the best. Do you have any suggestions for things that we could improve on?   Comments: NA   You may be randomly selected to fill out a [facility] survey. We would appreciate you taking the time to fill this out. It is important to us if you would answer all of the questions on the survey.

## 2018-08-27 ENCOUNTER — TRANSFERRED RECORDS (OUTPATIENT)
Dept: HEALTH INFORMATION MANAGEMENT | Facility: CLINIC | Age: 68
End: 2018-08-27

## 2018-08-28 NOTE — PROGRESS NOTES
Body mass index is 39.1 kg/(m^2).    Weight management plan: Patient was referred to their PCP to discuss a diet and exercise plan.    BP Readings from Last 1 Encounters:   08/30/18 137/70        BP noted to be well controlled today in office.     Tiesha IS a smoker.  Tiesha is ready to quit    Cris Nair MA on 8/30/2018 at 1:43 PM        Sleep Consultation:    Date on this visit: 8/30/2018    Tiesha Gurrola is sent by Jessy David for a sleep consultation regarding possible sleep apnea.    Primary Physician: Jessy David     Tiesha Gurrola reports nightly snoring for years.     Tiesha goes to sleep at 12:00 AM during the week. She wakes up at 9:30 AM without an alarm. She falls asleep in 60 minutes.  Tiesha has difficulty falling asleep.  She wakes up 2-4 times a night for 5 minutes before falling back to sleep.  Tiesha wakes up to go to the bathroom and uncertain reasons.  On weekends, Tiesha goes to sleep at 12:00 AM.  She wakes up at 10:00 AM without an alarm. She falls asleep in 60 minutes.  Patient gets an average of 8 hours of sleep per night.     Patient does read in bed.     Tiesha does not do shift work.  She works day shifts.      Tiesha does snore every night. Patient does not have a regular bed partner. There is  report of snoring, gasping and snorting.  She does have witnessed apneas.   Patient sleeps on her back and side. She has occasional restless legs,. Tiesha denies any bruxism, sleep walking, sleep talking, dream enactment, sleep paralysis, cataplexy and hypnogogic/hypnopompic hallucinations.    She denies sleep walking, sleep talking, enuresis and sleep terrors as a child.  Tiesha denies difficulty breathing through her nose, claustrophobia, reflux at night, heartburn and depression.      Tiesha has gained 0-5 pounds in the last year.    Patient's [place] Sleepiness score 12/24 consistent with excessive  daytime sleepiness.      Tiesha naps  1-2 times per day for 30-60 minutes, feels refreshed after naps. She takes no inadvertant naps.  She admits dozing while driving.   Patient was counseled on the importance of driving while alert, to pull over if drowsy, or nap before getting into the vehicle if sleepy.  She uses no caffeine.     Allergies:    Allergies   Allergen Reactions     Oxycodone Nausea and Vomiting       Medications:    Current Outpatient Prescriptions   Medication Sig Dispense Refill     ACE/ARB NOT PRESCRIBED, INTENTIONAL, Please choose reason not prescribed, below       albuterol (VENTOLIN HFA) 108 (90 BASE) MCG/ACT Inhaler Inhale 2 puffs into the lungs every 4 hours as needed for shortness of breath / dyspnea or wheezing 1 Inhaler 1     cetirizine (ZYRTEC) 10 MG tablet Take 1 tablet (10 mg) by mouth 2 times daily 60 tablet 11     escitalopram (LEXAPRO) 10 MG tablet Take 1 tablet (10 mg) by mouth daily 90 tablet 3     flecainide acetate 150 MG TABS Take 1 tablet (150 mg) by mouth every 12 hours 180 tablet 3     HYDROcodone-acetaminophen (NORCO) 5-325 MG per tablet Take 1 tablet by mouth every 4 hours as needed for pain 18 tablet 0     hydrOXYzine (ATARAX) 25 MG tablet Take 1-2 tablets (25-50 mg) by mouth nightly as needed (Patient taking differently: Take 25-50 mg by mouth nightly as needed ) 60 tablet 0     simvastatin (ZOCOR) 5 MG tablet Take 1 tablet (5 mg) by mouth daily 90 tablet 3     triamcinolone (KENALOG) 0.1 % cream Apply twice daily as needed 80 g 0       Problem List:  Patient Active Problem List    Diagnosis Date Noted     Morbid obesity (H) 05/23/2017     Priority: High     Health Care Home 07/20/2011     Priority: High     X    EMERGENCY CARE PLAN  Presenting Problem Signs and Symptoms Treatment Plan    Questions or conerns during clinic hours    I will call the clinic directly     Questions or conerns outside clinic hours    I will call the 24 hour nurse line at [phone]    Patient needs to schedule an appointment    I  will call the 24 hour scheduling team at [phone] or clinic directly    Same day treatment     I will call the clinic first, nurse line if after hours, urgent care and express care if needed                            DX V65.8 REPLACED WITH 08640 HEALTH CARE HOME (04/08/2013)       Polymorphic light eruption 08/01/2017     Priority: Medium     Acromioclavicular joint arthritis 06/09/2017     Priority: Medium     SOB (shortness of breath) 04/05/2017     Priority: Medium     Pruritic disorder 04/05/2017     Priority: Medium     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     Priority: Medium     Paroxysmal atrial fibrillation (H)      Priority: Medium     SVT (supraventricular tachycardia) (H) 05/20/2015     Priority: Medium     Hyperlipidemia      Priority: Medium     Anxiety 10/15/2009     Priority: Medium     Major depressive disorder, recurrent episode, moderate (H) 10/15/2009     Priority: Medium     Tobacco use disorder 03/25/2009     Priority: Medium     Mitral valve disorder 06/29/2006     Priority: Medium        Past Medical/Surgical History:  Past Medical History:   Diagnosis Date     Adhesive capsulitis      Adhesive capsulitis of shoulder 7/30/2013     History of blood transfusion      Hyperlipemia      Mitral valve disorder      Mitral valve disorders(424.0)     Hx of mitral valve prolapse     OA (osteoarthritis) of hip      Osteoarthritis of acromioclavicular joint 10/17/2012     Paroxysmal atrial fibrillation (H) 7/2008     Paroxysmal supraventricular tachycardia (H)      Rotator cuff tear 10/17/2012     Tobacco abuse      Past Surgical History:   Procedure Laterality Date     C LAP,UTERUS,UNLISTED PROCEDURE  08/27/2007    Lyis of adhesions of the uterus to the abdominal wall.     C TREAT ECTOPIC PREG,ABD PREG  1974    about 3 mos.     CANALPLASTY Right 6/23/2016    Procedure: CANALPLASTY;  Surgeon: Rishabh Boudreaux MD;  Location: [facility]     COLONOSCOPY  07/11/07     CYSTOSCOPY N/A 8/20/2018    Procedure:  CYSTOSCOPY;  cystoscopy, mid uretheral sling;  Surgeon: Kamari Sexton MD;  Location: [facility]     DILATION AND CURETTAGE  2/18/16    HD&C     DILATION AND CURETTAGE, OPERATIVE HYSTEROSCOPY, COMBINED N/A 2/18/2016    Procedure: COMBINED DILATION AND CURETTAGE, OPERATIVE HYSTEROSCOPY;  Surgeon: Keshia Chang DO;  Location: [facility] OR     GRAFT THIERSCH STATUS POST TYMPANOMASTOIDECTOMY Right 6/30/2016    Procedure: GRAFT THIERSCH STATUS POST TYMPANOMASTOIDECTOMY;  Surgeon: Rishabh Boudreaux MD;  Location: [facility] OR     H ABLATION FOCAL AFIB  10/12/16     HC LAPAROSCOPY, SURGICAL; APPENDECTOMY  08/27/2007     JOINT REPLACEMENT, HIP RT/LT Right 8/12/14    Joint Replacement Hip RT/LT     MEATOPLASTY EAR Right 6/23/2016    Procedure: MEATOPLASTY EAR;  Surgeon: Rishabh Boudreaux MD;  [facility]: [facility]     MYRINGOTOMY Right 4/26/2016    Procedure: MYRINGOTOMY;  Surgeon: Evi Solorzano MD;  Location: [facility]     SHOULDER SURGERY Left 1/7/15    torn bicep, arthroplasty, rotator cuff     SLING TRANSVAGINAL N/A 8/20/2018    Procedure: SLING TRANSVAGINAL;;  Surgeon: Kamari Sexton MD;  [facility]: [facility]     TYMPANOMASTOIDECTOMY Right 6/23/2016    Procedure: TYMPANOMASTOIDECTOMY;  Surgeon: Rishabh Boudreaux MD;  [facility]     TYMPANOMASTOIDECTOMY WITH FACIAL MONITORING  12/13/2011    Procedure:TYMPANOMASTOIDECTOMY WITH FACIAL MONITORING; right tympanoplasty, mastoidectomy with facial nerve monitoring; Surgeon:EVI SOLORZANO; Location:[facility] OR       Social History:  Social History     Social History     Marital status:      Spouse name: N/A     Number of children: 3     Years of education: N/A     Occupational History                Social History Main Topics     Smoking status: Current Every Day Smoker     Packs/day: 0.25     Years: 32.00     Types: Cigarettes     Smokeless tobacco: Never Used      Comment: 7 cigs a day     Alcohol use No     Drug use: No     Sexual activity: Yes      Partners: Male      Comment: no b/c     Other Topics Concern     Parent/Sibling W/ Cabg, Mi Or Angioplasty Before 65f 55m? No     Caffeine Concern No     Occupational Exposure No     Sleep Concern No     Stress Concern No     Weight Concern No     Exercise No     Seat Belt Yes     Social History Narrative       Family History:  Family History   Problem Relation Age of Onset     Allergies Son      Hayfever     HEART DISEASE Son      Paroxysmal tach.     Arthritis Mother      Depression Mother      depression and anxiety     Respiratory Mother      Asthma     Arthritis Father      HEART DISEASE Father      Lipids Father      Arrhythmia Father      Pacemaker Father      Heart Surgery Father      bypass x3     Cancer Maternal Grandmother      Breast CA     Cancer Paternal Grandmother      ?? pancreatic CA     Osteoporosis Paternal Grandmother      Neurologic Disorder Daughter      ?? epilepsy     Obesity Daughter      Family History Negative Brother      Family History Negative Son      Family History Negative Brother      Diabetes Other      Maternal cousin --- [email or web address]     EYE* Other      Niece-- lazy eye     HEART DISEASE Paternal Uncle      death at 56/bypass surgery     HEART DISEASE Paternal Aunt        Review of Systems:  A complete review of systems reviewed by me is negative with the exeption of what has been mentioned in the history of present illness.  CONSTITUTIONAL: NEGATIVE for weight gain/loss, fever, chills, sweats or night sweats, drug allergies.  EYES: NEGATIVE for changes in vision, blind spots, double vision.  ENT: NEGATIVE for ear pain, sore throat, sinus pain, post-nasal drip, runny nose, bloody nose  CARDIAC: NEGATIVE for fast heartbeats or fluttering in chest, chest pain or pressure, breathlessness when lying flat, swollen legs or swollen feet.  NEUROLOGIC: NEGATIVE headaches, weakness or numbness in the arms or legs.  DERMATOLOGIC: NEGATIVE for rashes, new moles or change in  "mole(s)  PULMONARY: NEGATIVE SOB at rest, SOB with activity, dry cough, productive cough, coughing up blood, wheezing or whistling when breathing.    GASTROINTESTINAL: NEGATIVE for nausea or vomitting, loose or watery stools, fat or grease in stools, constipation, abdominal pain, bowel movements black in color or blood noted.  GENITOURINARY: NEGATIVE for pain during urination, blood in urine, urinating more frequently than usual, irregular menstrual periods.  MUSCULOSKELETAL: NEGATIVE for muscle pain, bone or joint pain, swollen joints.  ENDOCRINE: NEGATIVE for increased thirst or urination, diabetes.  LYMPHATIC: NEGATIVE for swollen lymph nodes, lumps or bumps in the breasts or nipple discharge.    Physical Examination:  Vitals: /70  Pulse 64  Temp 97.4  F (36.3  C) (Temporal)  Resp 20  Ht 1.626 m (5' 4\")  Wt 103.3 kg (227 lb 12.8 oz)  LMP 05/01/2009  SpO2 97%  BMI 39.1 kg/m2  BMI= Body mass index is 39.1 kg/(m^2).    Neck Cir (cm): 41 cm    No flowsheet data found.         GENERAL APPEARANCE: healthy, alert and no distress  EYES: Eyes grossly normal to inspection, PERRL and conjunctivae and sclerae normal  HENT: ear canals and TM's normal, nose and mouth without ulcers or lesions, oropharynx crowded and soft palate dependent  NECK: no adenopathy, no asymmetry, masses, or scars and thyroid normal to palpation  NEURO: Normal strength and tone, mentation intact and speech normal  PSYCH: mentation appears normal and affect normal/bright  Mallampati Class: III.  Tonsillar Stage: 1  hidden by pillars.  Class 2 occlusion  Impression/Plan:    The patient with symptoms highly suggestive of moder to severe RENU.   Literature provided regarding sleep apnea and sleep hygiene.    STOP BANG 6.  The patient should be good candidate for HST.  She will follow up with me in approximately 6 weeks after her sleep study has been competed to review the results and discuss plan of care.       Polysomnography " reviewed.  Obstructive sleep apnea reviewed.  Complications of untreated sleep apnea were reviewed.    Jake Solorzano MD  Your BMI is Body mass index is 39.1 kg/(m^2).    What is BMI?  Body mass index (BMI) is one way to tell whether you are at a healthy weight, overweight, or obese. It measures your weight in relation to your height.  A BMI of 18.5 to 24.9 is in the healthy range. A person with a BMI of 25 to 29.9 is considered overweight, and someone with a BMI of 30 or greater is considered obese.  Another way to find out if you are at risk for health problems caused by overweight and obesity is to measure your waist. If you are a woman and your waist is more than 35 inches, or if you are a man and your waist is more than 40 inches, your risk of disease may be higher.  More than two-thirds of American adults are considered overweight or obese. Being overweight or obese increases the risk for further weight gain.  Excess weight may lead to heart disease and diabetes. Creating and following plans for healthy eating and physical activity may help you improve your health.    Methods for maintaining or losing weight.  Weight control is part of healthy lifestyle and includes exercise, emotional health, and healthy eating habits.  Careful eating habits lifelong is the mainstay of weight control.  Though there are significant health benefits from weight loss, long-term weight loss with diet alone may be very difficult to achieve- studies show long-term success with dietary management in less than 10% of people. Attaining a healthy weight may be especially difficult to achieve in those with severe obesity. In some cases, medications, devices and surgical management might be considered.    What can you do?  If you are overweight or obese and are interested in methods for weight loss, you should discuss this with your provider. In addition, we recommend that you review healthy life styles and methods for weight loss  available through the National Institutes of Health patient information sites:   http://win.niddk.nih.gov/publications/index.htm          CC: Jessy David

## 2018-08-30 ENCOUNTER — OFFICE VISIT (OUTPATIENT)
Dept: SLEEP MEDICINE | Facility: CLINIC | Age: 68
End: 2018-08-30
Attending: FAMILY MEDICINE
Payer: COMMERCIAL

## 2018-08-30 VITALS
HEIGHT: 64 IN | DIASTOLIC BLOOD PRESSURE: 70 MMHG | TEMPERATURE: 97.4 F | HEART RATE: 64 BPM | WEIGHT: 227.8 LBS | BODY MASS INDEX: 38.89 KG/M2 | SYSTOLIC BLOOD PRESSURE: 137 MMHG | RESPIRATION RATE: 20 BRPM | OXYGEN SATURATION: 97 %

## 2018-08-30 DIAGNOSIS — G47.09 OTHER INSOMNIA: ICD-10-CM

## 2018-08-30 DIAGNOSIS — R06.83 SNORING: Primary | ICD-10-CM

## 2018-08-30 DIAGNOSIS — G47.19 EXCESSIVE DAYTIME SLEEPINESS: ICD-10-CM

## 2018-08-30 PROCEDURE — 99213 OFFICE O/P EST LOW 20 MIN: CPT | Performed by: OTOLARYNGOLOGY

## 2018-08-30 RX ORDER — ZOLPIDEM TARTRATE 5 MG/1
TABLET ORAL
Qty: 1 TABLET | Refills: 0 | Status: SHIPPED | OUTPATIENT
Start: 2018-08-30 | End: 2018-10-04

## 2018-08-30 NOTE — MR AVS SNAPSHOT
"              After Visit Summary   8/30/2018    Tiesha Gurrola    MRN: [id number]           Patient Information     Date Of Birth          1950        Visit Information        Provider Department      8/30/2018 1:30 PM Jake Solorzano MD [facility]        Today's Diagnoses     Snoring    -  1    Excessive daytime sleepiness        Other insomnia           Follow-ups after your visit        Future tests that were ordered for you today     Open Future Orders        Priority Expected Expires Ordered    Comprehensive Sleep Study Routine  2/26/2019 8/30/2018    HST-Home Sleep Apnea Test Routine  3/1/2019 8/30/2018            Who to contact     If you have questions or need follow up information about today's clinic visit or your schedule please contact [facility] directly at [phone].  Normal or non-critical lab and imaging results will be communicated to you by MyChart, letter or phone within 4 business days after the clinic has received the results. If you do not hear from us within 7 days, please contact the clinic through MyChart or phone. If you have a critical or abnormal lab result, we will notify you by phone as soon as possible.  Submit refill requests through [employer] or call your pharmacy and they will forward the refill request to us. Please allow 3 business days for your refill to be completed.          Additional Information About Your Visit        MyChart Information     [employer] lets you send messages to your doctor, view your test results, renew your prescriptions, schedule appointments and more. To sign up, go to [email or web address] . Click on \"Log in\" on the left side of the screen, which will take you to the Welcome page. Then click on \"Sign up Now\" on the right side of the page.     You will be asked to enter the access code listed below, as well as some personal information. Please follow the directions to create your username and " "password.     Your access code is: [email or web address]  Expires: 2018  2:12 PM     Your access code will  in 90 days. If you need help or a new code, please call your [place] clinic or [phone].        Care EveryWhere ID     This is your Care EveryWhere ID. This could be used by other organizations to access your [place] medical records  [id number]        Your Vitals Were     Pulse Temperature Respirations Height Last Period Pulse Oximetry    64 97.4  F (36.3  C) (Temporal) 20 1.626 m (5' 4\") 2009 97%    BMI (Body Mass Index)                   39.1 kg/m2            Blood Pressure from Last 3 Encounters:   18 137/70   18 114/59   08/10/18 120/72    Weight from Last 3 Encounters:   18 103.3 kg (227 lb 12.8 oz)   18 103.4 kg (228 lb)   08/10/18 103.4 kg (228 lb)              We Performed the Following     SLEEP EVALUATION & MANAGEMENT REFERRAL - ADULT -[facility] [phone] (Age 13 if over 100 lbs)          Today's Medication Changes          These changes are accurate as of 18  2:12 PM.  If you have any questions, ask your nurse or doctor.               Start taking these medicines.        Dose/Directions    zolpidem 5 MG tablet   Commonly known as:  AMBIEN   Used for:  Other insomnia   Started by:  Jake Solorzano MD        Take tablet by mouth 15 minutes prior to sleep, for Sleep Study   Quantity:  1 tablet   Refills:  0            Where to get your medicines      Some of these will need a paper prescription and others can be bought over the counter.  Ask your nurse if you have questions.     Bring a paper prescription for each of these medications     zolpidem 5 MG tablet                Primary Care Provider Office Phone # Fax #    Jessy David [phone]       [address]  [facility] [address]        Equal Access to Services     ADALID HIDALGO AH: jaydon Bey, qaybta " judith mckennalavell mejia ahAgnely [facility] [phone].    ATENCIÓN: Si chilo snider, tiene a mahoney disposición servicios gratuitos de asistencia lingüística. Vamshi al [phone].    We comply with applicable federal civil rights laws and [place] laws. We do not discriminate on the basis of race, color, national origin, age, disability, sex, sexual orientation, or gender identity.            Thank you!     Thank you for choosing [facility]  for your care. Our goal is always to provide you with excellent care. Hearing back from our patients is one way we can continue to improve our services. Please take a few minutes to complete the written survey that you may receive in the mail after your visit with us. Thank you!             Your Updated Medication List - Protect others around you: Learn how to safely use, store and throw away your medicines at www.disposemymeds.org.          This list is accurate as of 8/30/18  2:12 PM.  Always use your most recent med list.                   Brand Name Dispense Instructions for use Diagnosis    ACE/ARB/ARNI NOT PRESCRIBED (INTENTIONAL)      Please choose reason not prescribed, below    Major depressive disorder, recurrent episode, moderate (H)       albuterol 108 (90 Base) MCG/ACT inhaler    VENTOLIN HFA    1 Inhaler    Inhale 2 puffs into the lungs every 4 hours as needed for shortness of breath / dyspnea or wheezing    SOB (shortness of breath)       cetirizine 10 MG tablet    zyrTEC    60 tablet    Take 1 tablet (10 mg) by mouth 2 times daily    Pruritic disorder       escitalopram 10 MG tablet    LEXAPRO    90 tablet    Take 1 tablet (10 mg) by mouth daily    Major depressive disorder, recurrent episode, moderate (H)       flecainide acetate 150 MG Tabs     180 tablet    Take 1 tablet (150 mg) by mouth every 12 hours    Paroxysmal atrial fibrillation (H)       HYDROcodone-acetaminophen 5-325 MG per tablet    NORCO    18  tablet    Take 1 tablet by mouth every 4 hours as needed for pain    Stress incontinence in female       hydrOXYzine 25 MG tablet    ATARAX    60 tablet    Take 1-2 tablets (25-50 mg) by mouth nightly as needed    Insomnia, unspecified type       simvastatin 5 MG tablet    ZOCOR    90 tablet    Take 1 tablet (5 mg) by mouth daily    Hyperlipidemia, unspecified hyperlipidemia type       triamcinolone 0.1 % cream    KENALOG    80 g    Apply twice daily as needed    Pruritus, Dermatitis       zolpidem 5 MG tablet    AMBIEN    1 tablet    Take tablet by mouth 15 minutes prior to sleep, for Sleep Study    Other insomnia

## 2018-09-11 ENCOUNTER — THERAPY VISIT (OUTPATIENT)
Dept: SLEEP MEDICINE | Facility: CLINIC | Age: 68
End: 2018-09-11
Payer: COMMERCIAL

## 2018-09-11 ENCOUNTER — HOSPITAL ENCOUNTER (EMERGENCY)
Facility: CLINIC | Age: 68
End: 2018-09-11
Payer: COMMERCIAL

## 2018-09-11 DIAGNOSIS — R06.83 SNORING: ICD-10-CM

## 2018-09-11 DIAGNOSIS — G47.19 EXCESSIVE DAYTIME SLEEPINESS: ICD-10-CM

## 2018-09-11 PROCEDURE — 95810 POLYSOM 6/> YRS 4/> PARAM: CPT | Performed by: OTOLARYNGOLOGY

## 2018-09-11 NOTE — MR AVS SNAPSHOT
"              After Visit Summary   9/11/2018    Tiesha Gurrola    MRN: [id number]           Patient Information     Date Of Birth          1950        Visit Information        Provider Department      9/11/2018 8:00 PM PH BED 2 [facility]        Today's Diagnoses     Snoring        Excessive daytime sleepiness           Follow-ups after your visit        Your next 10 appointments already scheduled     Sep 19, 2018 11:00 AM CDT   [facility]    [address]  [address]  [facility] [id number] [phone]            Oct 04, 2018  2:15 PM CDT   Return Sleep Patient with Jake Solorzano MD   [facility]    [address] [address] [phone]              Who to contact     If you have questions or need follow up information about today's clinic visit or your schedule please contact [facility] directly at [phone].  Normal or non-critical lab and imaging results will be communicated to you by [employer], letter or phone within 4 business days after the clinic has received the results. If you do not hear from us within 7 days, please contact the clinic through [employer] or phone. If you have a critical or abnormal lab result, we will notify you by phone as soon as possible.  Submit refill requests through [employer] or call your pharmacy and they will forward the refill request to us. Please allow 3 business days for your refill to be completed.          Additional Information About Your Visit        [employer] Information     [employer] lets you send messages to your doctor, view your test results, renew your prescriptions, schedule appointments and more. To sign up, go to [email or web address] . Click on \"Log in\" on the left side of the screen, which will take you to the Welcome page. Then click on \"Sign " "up Now\" on the right side of the page.     You will be asked to enter the access code listed below, as well as some personal information. Please follow the directions to create your username and password.     Your access code is: [email or web address]  Expires: 2018  2:12 PM     Your access code will  in 90 days. If you need help or a new code, please call your [place] clinic or [phone].        Care EveryWhere ID     This is your Care EveryWhere ID. This could be used by other organizations to access your [place] medical records  [id number]        Your Vitals Were     Last Period                   2009            Blood Pressure from Last 3 Encounters:   18 137/70   18 114/59   08/10/18 120/72    Weight from Last 3 Encounters:   18 103.3 kg (227 lb 12.8 oz)   18 103.4 kg (228 lb)   08/10/18 103.4 kg (228 lb)              We Performed the Following     Comprehensive Sleep Study        Primary Care Provider Office Phone # Fax #    Jessy ABHI MD Joann [phone]       [address] [address]        Equal Access to Services     ADALID HIDALGO : Hadii dominik palomo hadasho Sotabathaali, waaxda luqadaha, qaybta kaalmada adeegyada, judith mejia . So [facility] [phone].    ATENCIÓN: Si habla español, tiene a mahoney disposición servicios gratuitos de asistencia lingüística. Llame al [phone].    We comply with applicable federal civil rights laws and [place] laws. We do not discriminate on the basis of race, color, national origin, age, disability, sex, sexual orientation, or gender identity.            Thank you!     Thank you for choosing [facility]  for your care. Our goal is always to provide you with excellent care. Hearing back from our patients is one way we can continue to improve our services. Please take a few minutes to complete the written survey that you may receive in the mail after your visit " with us. Thank you!             Your Updated Medication List - Protect others around you: Learn how to safely use, store and throw away your medicines at www.disposemymeds.org.          This list is accurate as of 9/11/18 11:59 PM.  Always use your most recent med list.                   Brand Name Dispense Instructions for use Diagnosis    ACE/ARB/ARNI NOT PRESCRIBED (INTENTIONAL)      Please choose reason not prescribed, below    Major depressive disorder, recurrent episode, moderate (H)       albuterol 108 (90 Base) MCG/ACT inhaler    VENTOLIN HFA    1 Inhaler    Inhale 2 puffs into the lungs every 4 hours as needed for shortness of breath / dyspnea or wheezing    SOB (shortness of breath)       cetirizine 10 MG tablet    zyrTEC    60 tablet    Take 1 tablet (10 mg) by mouth 2 times daily    Pruritic disorder       escitalopram 10 MG tablet    LEXAPRO    90 tablet    Take 1 tablet (10 mg) by mouth daily    Major depressive disorder, recurrent episode, moderate (H)       flecainide acetate 150 MG Tabs     180 tablet    Take 1 tablet (150 mg) by mouth every 12 hours    Paroxysmal atrial fibrillation (H)       HYDROcodone-acetaminophen 5-325 MG per tablet    NORCO    18 tablet    Take 1 tablet by mouth every 4 hours as needed for pain    Stress incontinence in female       hydrOXYzine 25 MG tablet    ATARAX    60 tablet    Take 1-2 tablets (25-50 mg) by mouth nightly as needed    Insomnia, unspecified type       simvastatin 5 MG tablet    ZOCOR    90 tablet    Take 1 tablet (5 mg) by mouth daily    Hyperlipidemia, unspecified hyperlipidemia type       triamcinolone 0.1 % cream    KENALOG    80 g    Apply twice daily as needed    Pruritus, Dermatitis       zolpidem 5 MG tablet    AMBIEN    1 tablet    Take tablet by mouth 15 minutes prior to sleep, for Sleep Study    Other insomnia

## 2018-09-13 LAB — SLPCOMP: NORMAL

## 2018-09-17 NOTE — PROGRESS NOTES
ENT Consultation    Tiesha Gurrola who is a 67 year old female who was a former [facility] patient.       History of Present Illness - Tiesha Gurrola is a 67 year old female who presents today after getting the end of her hearing aid stuck in her left ear canal. Patient reports that her hearing aid improves her hearing on the left. She does not have a hearing aid for the right since the hearing is so far gone.       Body mass index is 40.17 kg/(m^2).    Weight management plan: Patient was referred to their PCP to discuss a diet and exercise plan.    BP Readings from Last 1 Encounters:   09/19/18 142/84     BP noted to be well controlled today in office.     Tiesha IS a smoker/uses chewing tobacco.  Tiesha is not ready to quit      Past Medical History -   Past Medical History:   Diagnosis Date     Adhesive capsulitis      Adhesive capsulitis of shoulder 7/30/2013     History of blood transfusion      Hyperlipemia      Mitral valve disorder      Mitral valve disorders(424.0)     Hx of mitral valve prolapse     OA (osteoarthritis) of hip      Osteoarthritis of acromioclavicular joint 10/17/2012     Paroxysmal atrial fibrillation (H) 7/2008     Paroxysmal supraventricular tachycardia (H)      Rotator cuff tear 10/17/2012     Tobacco abuse        Current Medications -   Current Outpatient Prescriptions:      cetirizine (ZYRTEC) 10 MG tablet, Take 1 tablet (10 mg) by mouth 2 times daily, Disp: 60 tablet, Rfl: 11     escitalopram (LEXAPRO) 10 MG tablet, Take 1 tablet (10 mg) by mouth daily, Disp: 90 tablet, Rfl: 3     flecainide acetate 150 MG TABS, Take 1 tablet (150 mg) by mouth every 12 hours, Disp: 180 tablet, Rfl: 3     simvastatin (ZOCOR) 5 MG tablet, Take 1 tablet (5 mg) by mouth daily, Disp: 90 tablet, Rfl: 3     triamcinolone (KENALOG) 0.1 % cream, Apply twice daily as needed, Disp: 80 g, Rfl: 0     zolpidem (AMBIEN) 5 MG tablet, Take tablet by mouth 15 minutes prior to sleep, for Sleep Study, Disp: 1  tablet, Rfl: 0     ACE/ARB NOT PRESCRIBED, INTENTIONAL,, Please choose reason not prescribed, below, Disp: , Rfl:      albuterol (VENTOLIN HFA) 108 (90 BASE) MCG/ACT Inhaler, Inhale 2 puffs into the lungs every 4 hours as needed for shortness of breath / dyspnea or wheezing, Disp: 1 Inhaler, Rfl: 1     HYDROcodone-acetaminophen (NORCO) 5-325 MG per tablet, Take 1 tablet by mouth every 4 hours as needed for pain (Patient not taking: Reported on 9/19/2018), Disp: 18 tablet, Rfl: 0     hydrOXYzine (ATARAX) 25 MG tablet, Take 1-2 tablets (25-50 mg) by mouth nightly as needed (Patient taking differently: Take 25-50 mg by mouth nightly as needed ), Disp: 60 tablet, Rfl: 0    Allergies -   Allergies   Allergen Reactions     Oxycodone Nausea and Vomiting       Social History -   Social History     Social History     Marital status:      Spouse name: N/A     Number of children: 3     Years of education: N/A     Occupational History                Social History Main Topics     Smoking status: Current Every Day Smoker     Packs/day: 0.25     Years: 32.00     Types: Cigarettes     Smokeless tobacco: Never Used      Comment: 7 cigs a day     Alcohol use No     Drug use: No     Sexual activity: Yes     Partners: Male      Comment: no b/c     Other Topics Concern     Parent/Sibling W/ Cabg, Mi Or Angioplasty Before 65f 55m? No     Caffeine Concern No     Occupational Exposure No     Sleep Concern No     Stress Concern No     Weight Concern No     Exercise No     Seat Belt Yes     Social History Narrative       Family History -   Family History   Problem Relation Age of Onset     Allergies Son      Hayfever     HEART DISEASE Son      Paroxysmal tach.     Arthritis Mother      Depression Mother      depression and anxiety     Respiratory Mother      Asthma     Arthritis Father      HEART DISEASE Father      Lipids Father      Arrhythmia Father      Pacemaker Father      Heart Surgery Father      bypass x3      "Cancer Maternal Grandmother      Breast CA     Cancer Paternal Grandmother      ?? pancreatic CA     Osteoporosis Paternal Grandmother      Neurologic Disorder Daughter      ?? epilepsy     Obesity Daughter      Family History Negative Brother      Family History Negative Son      Family History Negative Brother      Diabetes Other      Maternal cousin --- [email or web address]     EYE* Other      Niece-- lazy eye     HEART DISEASE Paternal Uncle      death at 56/bypass surgery     HEART DISEASE Paternal Aunt        Review of Systems - As per HPI and PMHx, otherwise review of system review of the head and neck negative.    Physical Exam  /84  Ht 1.626 m (5' 4\")  Wt 106.1 kg (234 lb)  LMP 05/01/2009  BMI 40.17 kg/m2  BMI: Body mass index is 40.17 kg/(m^2).    General - The patient is well nourished and well developed, and appears to have good nutritional status.  Alert and oriented to person and place, answers questions and cooperates with examination appropriately.    SKIN - No suspicious lesions or rashes.  Respiration - No respiratory distress.  Head and Face - Normocephalic and atraumatic, with no gross asymmetry noted of the contour of the facial features.  The facial nerve is intact, with strong symmetric movements.    Voice and Breathing - The patient was breathing comfortably without the use of accessory muscles. The patients voice was clear and strong, and had appropriate pitch and quality.    Ears - Bilateral pinna and EACs with normal appearing overlying skin. Tympanic membrane intact with good mobility on pneumatic otoscopy bilaterally. Bony landmarks of the ossicular chain are normal. The tympanic membranes are normal in appearance. No retraction, perforation, or masses.  No fluid or purulence was seen in the external canal or the middle ear.     Eyes - Extraocular movements intact.  Sclera were not icteric or injected, conjunctiva were pink and moist.    Mouth - Examination of the oral cavity showed " pink, healthy oral mucosa. No lesions or ulcerations noted.  The tongue was mobile and midline, and the dentition were in good condition.      Throat - The walls of the oropharynx were smooth, pink, moist, symmetric, and had no lesions or ulcerations.  The tonsillar pillars and soft palate were symmetric.  The uvula was midline on elevation.    Neck - Normal midline excursion of the laryngotracheal complex during swallowing.  Full range of motion on passive movement.  Palpation of the occipital, submental, submandibular, internal jugular chain, and supraclavicular nodes did not demonstrate any abnormal lymph nodes or masses.  The carotid pulse was palpable bilaterally.  Palpation of the thyroid was soft and smooth, with no nodules or goiter appreciated.  The trachea was mobile and midline.    Nose - External contour is symmetric, no gross deflection or scars.  Nasal mucosa is pink and moist with no abnormal mucus.  The septum was midline and non-obstructive, turbinates of normal size and position.  No polyps, masses, or purulence noted on examination.    Neuro - Nonfocal neuro exam is normal, CN 2 through 12 intact, normal gait and muscle tone.      Performed in clinic today:  Procedure - Cleaning and debridement of mastoid bowl  The patient was positioned semi-supine in the examination chair.  I examined the mastoid bowl and performed debridement using the magnified speculum on the right ear.  I began by using a cerumen loop to gently peel the squamous debris away from the anterior and inferior aspects of the external canal.  Working my medially, I exposed the tympanic membrane and cleared the debris in an anterior to posterior direction.  Once I was clear of the TM, I then switched to a #7 suction to debulk as much debris as I could from the mastoid bowl.  Once this was done, I used an alligator forcep to grasp the edge of the impacted mass, and rotated out of the ear in several large pieces.  I inspected the  mastoid bowl and it was well epithelialized.  No evidence of cholesteatoma or herniation through the tegmen.  The patient tolerated the procedure well.      A/P - Tiesha Gurrola is a 67 year old female with significant sensorineural hearing loss much more so on the right side essentially nonserviceable ear and still very good word recognition score in the left where she does wear hearing aid.  Also mastoidectomy cavity was cleaned appropriately from debris.  Patient will show up in 6 months for exam of the mastoid cavity possible debridement.      This document serves as a record of the services and decisions personally performed and made by Dr. Jake Solorzano MD. It was created on his behalf by Francisca Caban, a trained medical scribe. The creation of this document is based the provider's statements to the medical scribe.  Francisca Caban 9/19/2018    Provider:   The information in this document, created by the medical scribe for me, accurately reflects the services I personally performed and the decisions made by me. I have reviewed and approved this document for accuracy prior to leaving the patient care area.  Dr. Jake Solorzano MD 9/19/2018    Jake Solorzano MD

## 2018-09-19 ENCOUNTER — OFFICE VISIT (OUTPATIENT)
Dept: AUDIOLOGY | Facility: OTHER | Age: 68
End: 2018-09-19
Payer: COMMERCIAL

## 2018-09-19 ENCOUNTER — OFFICE VISIT (OUTPATIENT)
Dept: OTOLARYNGOLOGY | Facility: OTHER | Age: 68
End: 2018-09-19
Payer: COMMERCIAL

## 2018-09-19 VITALS
SYSTOLIC BLOOD PRESSURE: 142 MMHG | HEIGHT: 64 IN | BODY MASS INDEX: 39.95 KG/M2 | DIASTOLIC BLOOD PRESSURE: 84 MMHG | WEIGHT: 234 LBS

## 2018-09-19 DIAGNOSIS — H90.A22 SENSORINEURAL HEARING LOSS (SNHL) OF LEFT EAR WITH RESTRICTED HEARING OF RIGHT EAR: ICD-10-CM

## 2018-09-19 DIAGNOSIS — H90.3 SENSORINEURAL HEARING LOSS (SNHL) OF BOTH EARS: Primary | ICD-10-CM

## 2018-09-19 DIAGNOSIS — H95.121 GRANULATION OF POSTMASTOIDECTOMY CAVITY OF RIGHT SIDE: ICD-10-CM

## 2018-09-19 DIAGNOSIS — H90.A31 MIXED CONDUCTIVE AND SENSORINEURAL HEARING LOSS OF RIGHT EAR WITH RESTRICTED HEARING OF LEFT EAR: Primary | ICD-10-CM

## 2018-09-19 PROCEDURE — 92567 TYMPANOMETRY: CPT | Performed by: AUDIOLOGIST

## 2018-09-19 PROCEDURE — 69220 CLEAN OUT MASTOID CAVITY: CPT | Mod: RT | Performed by: OTOLARYNGOLOGY

## 2018-09-19 PROCEDURE — 92557 COMPREHENSIVE HEARING TEST: CPT | Performed by: AUDIOLOGIST

## 2018-09-19 PROCEDURE — 99203 OFFICE O/P NEW LOW 30 MIN: CPT | Mod: 25 | Performed by: OTOLARYNGOLOGY

## 2018-09-19 PROCEDURE — 99207 ZZC NO CHARGE LOS: CPT | Performed by: AUDIOLOGIST

## 2018-09-19 NOTE — MR AVS SNAPSHOT
"              After Visit Summary   9/19/2018    Tiesha Gurrola    MRN: [id number]           Patient Information     Date Of Birth          1950        Visit Information        Provider Department      9/19/2018 11:00 AM Jake Solorzano MD 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  Normal or non-critical lab and imaging results will be communicated to you by YaKlasshart, letter or phone within 4 business days after the clinic has received the results. If you do not hear from us within 7 days, please contact the clinic through YaKlasshart or phone. If you have a critical or abnormal lab result, we will notify you by phone as soon as possible.  Submit refill requests through TagSeats or call your pharmacy and they will forward the refill request to us. Please allow 3 business days for your refill to be completed.          Additional Information About Your Visit        YaKlasshart Information     TagSeats lets you send messages to your doctor, view your test results, renew your prescriptions, schedule appointments and more. To sign up, go to [email or web address] . Click on \"Log in\" on the left side of the screen, which will take you to the Welcome page. Then click on \"Sign up Now\" on the right side of the page.     You will be asked to enter " "the access code listed below, as well as some personal information. Please follow the directions to create your username and password.     Your access code is: [email or web address]  Expires: 2018  2:12 PM     Your access code will  in 90 days. If you need help or a new code, please call your [place] clinic or [phone].        Care EveryWhere ID     This is your Care EveryWhere ID. This could be used by other organizations to access your [place] medical records  [id number]        Your Vitals Were     Height Last Period BMI (Body Mass Index)             1.626 m (5' 4\") 2009 40.17 kg/m2          Blood Pressure from Last 3 Encounters:   18 142/84   18 137/70   18 114/59    Weight from Last 3 Encounters:   18 106.1 kg (234 lb)   18 103.3 kg (227 lb 12.8 oz)   18 103.4 kg (228 lb)              We Performed the Following     AUDIOLOGY ADULT REFERRAL     DEBRIDMENT MASTOID CAVITY, SIMPLE        Primary Care Provider Office Phone # Fax #    Jessy MOE MD Joann [phone]       [address] [place] [address]        Equal Access to Services     [facility]: Hadii dominik ku hadasho Soomaali, waaxda luqadaha, qaybta kaalmada adeegyada, judith mejia . So [facility] [phone].    ATENCIÓN: Si habla español, tiene a mahoney disposición servicios gratuitos de asistencia lingüística. ame al [phone].    We comply with applicable federal civil rights laws and [place] laws. We do not discriminate on the basis of race, color, national origin, age, disability, sex, sexual orientation, or gender identity.            Thank you!     Thank you for choosing [facility]  for your care. Our goal is always to provide you with excellent care. Hearing back from our patients is one way we can continue to improve our services. Please take a few minutes to complete the written survey that you may receive in the mail after " your visit with us. Thank you!             Your Updated Medication List - Protect others around you: Learn how to safely use, store and throw away your medicines at www.disposemymeds.org.          This list is accurate as of 9/19/18  1:14 PM.  Always use your most recent med list.                   Brand Name Dispense Instructions for use Diagnosis    ACE/ARB/ARNI NOT PRESCRIBED (INTENTIONAL)      Please choose reason not prescribed, below    Major depressive disorder, recurrent episode, moderate (H)       albuterol 108 (90 Base) MCG/ACT inhaler    VENTOLIN HFA    1 Inhaler    Inhale 2 puffs into the lungs every 4 hours as needed for shortness of breath / dyspnea or wheezing    SOB (shortness of breath)       cetirizine 10 MG tablet    zyrTEC    60 tablet    Take 1 tablet (10 mg) by mouth 2 times daily    Pruritic disorder       escitalopram 10 MG tablet    LEXAPRO    90 tablet    Take 1 tablet (10 mg) by mouth daily    Major depressive disorder, recurrent episode, moderate (H)       flecainide acetate 150 MG Tabs     180 tablet    Take 1 tablet (150 mg) by mouth every 12 hours    Paroxysmal atrial fibrillation (H)       HYDROcodone-acetaminophen 5-325 MG per tablet    NORCO    18 tablet    Take 1 tablet by mouth every 4 hours as needed for pain    Stress incontinence in female       hydrOXYzine 25 MG tablet    ATARAX    60 tablet    Take 1-2 tablets (25-50 mg) by mouth nightly as needed    Insomnia, unspecified type       simvastatin 5 MG tablet    ZOCOR    90 tablet    Take 1 tablet (5 mg) by mouth daily    Hyperlipidemia, unspecified hyperlipidemia type       triamcinolone 0.1 % cream    KENALOG    80 g    Apply twice daily as needed    Pruritus, Dermatitis       zolpidem 5 MG tablet    AMBIEN    1 tablet    Take tablet by mouth 15 minutes prior to sleep, for Sleep Study    Other insomnia

## 2018-09-19 NOTE — MR AVS SNAPSHOT
"              After Visit Summary   9/19/2018    Tiesha Gurrola    MRN: [id number]           Patient Information     Date Of Birth          1950        Visit Information        Provider Department      9/19/2018 10:15 AM Sandhya Mejia [facility]        Today's Diagnoses     Mixed conductive and sensorineural hearing loss of right ear with restricted hearing of left ear    -  1    Sensorineural hearing loss (SNHL) of left ear with restricted hearing of right ear           Follow-ups after your visit        Your next 10 appointments already scheduled     Oct 04, 2018  2:15 PM CDT   Return Sleep Patient with Jake Solorzano MD   [facility]    [address] [place] [address] [phone]              Who to contact     If you have questions or need follow up information about today's clinic visit or your schedule please contact [facility] directly at [phone].  Normal or non-critical lab and imaging results will be communicated to you by AppleTreeBookhart, letter or phone within 4 business days after the clinic has received the results. If you do not hear from us within 7 days, please contact the clinic through [email or web address] Textbook Timet or phone. If you have a critical or abnormal lab result, we will notify you by phone as soon as possible.  Submit refill requests through Fliptop or call your pharmacy and they will forward the refill request to us. Please allow 3 business days for your refill to be completed.          Additional Information About Your Visit        AppleTreeBookhart Information     Fliptop lets you send messages to your doctor, view your test results, renew your prescriptions, schedule appointments and more. To sign up, go to [email or web address] . Click on \"Log in\" on the left side of the screen, which will take you to the Welcome page. Then click on \"Sign up Now\" on the right side of the page.     You will be " asked to enter the access code listed below, as well as some personal information. Please follow the directions to create your username and password.     Your access code is: [email or web address]  Expires: 2018  2:12 PM     Your access code will  in 90 days. If you need help or a new code, please call your [facility] clinic or [phone].        Care EveryWhere ID     This is your Care EveryWhere ID. This could be used by other organizations to access your [facility] medical records  [id number]        Your Vitals Were     Last Period                   2009            Blood Pressure from Last 3 Encounters:   18 142/84   18 137/70   18 114/59    Weight from Last 3 Encounters:   18 234 lb (106.1 kg)   18 227 lb 12.8 oz (103.3 kg)   18 228 lb (103.4 kg)              We Performed the Following     AUDIOGRAM/TYMPANOGRAM - INTERFACE     COMPREHENSIVE HEARING TEST     TYMPANOMETRY        Primary Care Provider Office Phone # Fax #    Jessy F MD Joann [phone]       [address] [facility] [address]  [facility] [address]        Equal Access to Services     [facility]: Hadii dominik ku hadasho Soomaali, waaxda luqadaha, qaybta kaalmada adeegyada, judith mejia . So [facility] [phone].    ATENCIÓN: Si habla español, tiene a mahoney disposición servicios gratuitos de asistencia lingüística. Rosieame al [phone].    We comply with applicable federal civil rights laws and [place] laws. We do not discriminate on the basis of race, color, national origin, age, disability, sex, sexual orientation, or gender identity.            Thank you!     Thank you for choosing [facility]  for your care. Our goal is always to provide you with excellent care. Hearing back from our patients is one way we can continue to improve our services. Please take a few minutes to complete the written survey that you may receive in the mail after your visit with  us. Thank you!             Your Updated Medication List - Protect others around you: Learn how to safely use, store and throw away your medicines at www.disposemymeds.org.          This list is accurate as of 9/19/18 12:13 PM.  Always use your most recent med list.                   Brand Name Dispense Instructions for use Diagnosis    ACE/ARB/ARNI NOT PRESCRIBED (INTENTIONAL)      Please choose reason not prescribed, below    Major depressive disorder, recurrent episode, moderate (H)       albuterol 108 (90 Base) MCG/ACT inhaler    VENTOLIN HFA    1 Inhaler    Inhale 2 puffs into the lungs every 4 hours as needed for shortness of breath / dyspnea or wheezing    SOB (shortness of breath)       cetirizine 10 MG tablet    zyrTEC    60 tablet    Take 1 tablet (10 mg) by mouth 2 times daily    Pruritic disorder       escitalopram 10 MG tablet    LEXAPRO    90 tablet    Take 1 tablet (10 mg) by mouth daily    Major depressive disorder, recurrent episode, moderate (H)       flecainide acetate 150 MG Tabs     180 tablet    Take 1 tablet (150 mg) by mouth every 12 hours    Paroxysmal atrial fibrillation (H)       HYDROcodone-acetaminophen 5-325 MG per tablet    NORCO    18 tablet    Take 1 tablet by mouth every 4 hours as needed for pain    Stress incontinence in female       hydrOXYzine 25 MG tablet    ATARAX    60 tablet    Take 1-2 tablets (25-50 mg) by mouth nightly as needed    Insomnia, unspecified type       simvastatin 5 MG tablet    ZOCOR    90 tablet    Take 1 tablet (5 mg) by mouth daily    Hyperlipidemia, unspecified hyperlipidemia type       triamcinolone 0.1 % cream    KENALOG    80 g    Apply twice daily as needed    Pruritus, Dermatitis       zolpidem 5 MG tablet    AMBIEN    1 tablet    Take tablet by mouth 15 minutes prior to sleep, for Sleep Study    Other insomnia

## 2018-09-19 NOTE — PROGRESS NOTES
"AUDIOLOGY REPORT:    Patient was referred from ENT by Dr. Solorzano for audiology evaluation. Patient has a history of right-sided ear surgery. Previous audiogram from [facility] on 6/21/2016 is consistent with moderate-moderately severe sensorineural hearing loss in the left ear and severe-profound mixed hearing loss in the right ear. Patient feels that her hearing has worsened since then. She reports occasional popping noises and otalgia in he right ear and states that her right ear \"never feels normal\". Patient wears a ReSound MARÍA hearing aid in her left ear, fit at [facility] within the last year. She feels that her hearing aid is working well for her.    Testing:    Otoscopy:   Otoscopic exam indicates ears are clear of cerumen bilaterally   NOTE: patient's hearing aid dome became lodged in her ear when she removed the hearing aid for testing. I attempted to remove it using an alligator forceps but was unable to do so. Patient was seen by Dr. Solorzano for removal, and testing continued following removal.     Tympanograms:    RIGHT: large ear canal volume (surgical ear)     LEFT:   normal eardrum mobility  Today's results demonstrate decline in hearing of 10-20 dB in the left ear from 500-1000 Hz and from 0917-8556 Hz, and a decline of 10-25 dB in the right ear from 250-4000 Hz re: audiogram from 6/21/2016.    Thresholds:   Pure Tone Thresholds assessed using conventional audiometry with good  reliability from 250-8000 Hz bilaterally using insert earphones and circumaural headphones     RIGHT:  severe and profound mixed hearing loss    LEFT:    moderately severe-severe sensorineural hearing loss    Speech Reception Threshold:    RIGHT: 105 dB HL    LEFT:   70 dB HL  Results are in agreement with pure tone average.     Word Recognition Score:     RIGHT: 80% at 95 dB HL using NU-6 recorded word list.    LEFT:   28% at 105 dB HL using NU-6 recorded word list.    Discussed results with the patient.     Patient was " returned to ENT for follow up.     Sanaz Bob, CCC-A  Licensed Audiologist #[id number]  9/19/2018

## 2018-09-19 NOTE — LETTER
9/19/2018         RE: Tiesha Gurrola  [address] [facility] [address] [facility] [address]        Dear Colleague,    Thank you for referring your patient, Tiesha Gurrola, to the [facility]. Please see a copy of my visit note below.    ENT Consultation    Tiesha Gurrola who is a 67 year old female who was a former [facility] patient.       History of Present Illness - Tiesha Gurrola is a 67 year old female who presents today after getting the end of her hearing aid stuck in her left ear canal. Patient reports that her hearing aid improves her hearing on the left. She does not have a hearing aid for the right since the hearing is so far gone.       Body mass index is 40.17 kg/(m^2).    Weight management plan: Patient was referred to their PCP to discuss a diet and exercise plan.    BP Readings from Last 1 Encounters:   09/19/18 142/84     BP noted to be well controlled today in office.     Tiesha IS a smoker/uses chewing tobacco.  Tiesha is not ready to quit      Past Medical History -   Past Medical History:   Diagnosis Date     Adhesive capsulitis      Adhesive capsulitis of shoulder 7/30/2013     History of blood transfusion      Hyperlipemia      Mitral valve disorder      Mitral valve disorders(424.0)     Hx of mitral valve prolapse     OA (osteoarthritis) of hip      Osteoarthritis of acromioclavicular joint 10/17/2012     Paroxysmal atrial fibrillation (H) 7/2008     Paroxysmal supraventricular tachycardia (H)      Rotator cuff tear 10/17/2012     Tobacco abuse        Current Medications -   Current Outpatient Prescriptions:      cetirizine (ZYRTEC) 10 MG tablet, Take 1 tablet (10 mg) by mouth 2 times daily, Disp: 60 tablet, Rfl: 11     escitalopram (LEXAPRO) 10 MG tablet, Take 1 tablet (10 mg) by mouth daily, Disp: 90 tablet, Rfl: 3     flecainide acetate 150 MG TABS, Take 1 tablet (150 mg) by mouth every 12 hours, Disp: 180 tablet, Rfl: 3     simvastatin (ZOCOR) 5 MG tablet, Take 1 tablet  (5 mg) by mouth daily, Disp: 90 tablet, Rfl: 3     triamcinolone (KENALOG) 0.1 % cream, Apply twice daily as needed, Disp: 80 g, Rfl: 0     zolpidem (AMBIEN) 5 MG tablet, Take tablet by mouth 15 minutes prior to sleep, for Sleep Study, Disp: 1 tablet, Rfl: 0     ACE/ARB NOT PRESCRIBED, INTENTIONAL,, Please choose reason not prescribed, below, Disp: , Rfl:      albuterol (VENTOLIN HFA) 108 (90 BASE) MCG/ACT Inhaler, Inhale 2 puffs into the lungs every 4 hours as needed for shortness of breath / dyspnea or wheezing, Disp: 1 Inhaler, Rfl: 1     HYDROcodone-acetaminophen (NORCO) 5-325 MG per tablet, Take 1 tablet by mouth every 4 hours as needed for pain (Patient not taking: Reported on 9/19/2018), Disp: 18 tablet, Rfl: 0     hydrOXYzine (ATARAX) 25 MG tablet, Take 1-2 tablets (25-50 mg) by mouth nightly as needed (Patient taking differently: Take 25-50 mg by mouth nightly as needed ), Disp: 60 tablet, Rfl: 0    Allergies -   Allergies   Allergen Reactions     Oxycodone Nausea and Vomiting       Social History -   Social History     Social History     Marital status:      Spouse name: N/A     Number of children: 3     Years of education: N/A     Occupational History                Social History Main Topics     Smoking status: Current Every Day Smoker     Packs/day: 0.25     Years: 32.00     Types: Cigarettes     Smokeless tobacco: Never Used      Comment: 7 cigs a day     Alcohol use No     Drug use: No     Sexual activity: Yes     Partners: Male      Comment: no b/c     Other Topics Concern     Parent/Sibling W/ Cabg, Mi Or Angioplasty Before 65f 55m? No     Caffeine Concern No     Occupational Exposure No     Sleep Concern No     Stress Concern No     Weight Concern No     Exercise No     Seat Belt Yes     Social History Narrative       Family History -   Family History   Problem Relation Age of Onset     Allergies Son      Hayfever     HEART DISEASE Son      Paroxysmal tach.     Arthritis Mother   "    Depression Mother      depression and anxiety     Respiratory Mother      Asthma     Arthritis Father      HEART DISEASE Father      Lipids Father      Arrhythmia Father      Pacemaker Father      Heart Surgery Father      bypass x3     Cancer Maternal Grandmother      Breast CA     Cancer Paternal Grandmother      ?? pancreatic CA     Osteoporosis Paternal Grandmother      Neurologic Disorder Daughter      ?? epilepsy     Obesity Daughter      Family History Negative Brother      Family History Negative Son      Family History Negative Brother      Diabetes Other      Maternal cousin --- [email or web address]     EYE* Other      Niece-- lazy eye     HEART DISEASE Paternal Uncle      death at 56/bypass surgery     HEART DISEASE Paternal Aunt        Review of Systems - As per HPI and PMHx, otherwise review of system review of the head and neck negative.    Physical Exam  /84  Ht 1.626 m (5' 4\")  Wt 106.1 kg (234 lb)  LMP 05/01/2009  BMI 40.17 kg/m2  BMI: Body mass index is 40.17 kg/(m^2).    General - The patient is well nourished and well developed, and appears to have good nutritional status.  Alert and oriented to person and place, answers questions and cooperates with examination appropriately.    SKIN - No suspicious lesions or rashes.  Respiration - No respiratory distress.  Head and Face - Normocephalic and atraumatic, with no gross asymmetry noted of the contour of the facial features.  The facial nerve is intact, with strong symmetric movements.    Voice and Breathing - The patient was breathing comfortably without the use of accessory muscles. The patients voice was clear and strong, and had appropriate pitch and quality.    Ears - Bilateral pinna and EACs with normal appearing overlying skin. Tympanic membrane intact with good mobility on pneumatic otoscopy bilaterally. Bony landmarks of the ossicular chain are normal. The tympanic membranes are normal in appearance. No retraction, perforation, or " masses.  No fluid or purulence was seen in the external canal or the middle ear.     Eyes - Extraocular movements intact.  Sclera were not icteric or injected, conjunctiva were pink and moist.    Mouth - Examination of the oral cavity showed pink, healthy oral mucosa. No lesions or ulcerations noted.  The tongue was mobile and midline, and the dentition were in good condition.      Throat - The walls of the oropharynx were smooth, pink, moist, symmetric, and had no lesions or ulcerations.  The tonsillar pillars and soft palate were symmetric.  The uvula was midline on elevation.    Neck - Normal midline excursion of the laryngotracheal complex during swallowing.  Full range of motion on passive movement.  Palpation of the occipital, submental, submandibular, internal jugular chain, and supraclavicular nodes did not demonstrate any abnormal lymph nodes or masses.  The carotid pulse was palpable bilaterally.  Palpation of the thyroid was soft and smooth, with no nodules or goiter appreciated.  The trachea was mobile and midline.    Nose - External contour is symmetric, no gross deflection or scars.  Nasal mucosa is pink and moist with no abnormal mucus.  The septum was midline and non-obstructive, turbinates of normal size and position.  No polyps, masses, or purulence noted on examination.    Neuro - Nonfocal neuro exam is normal, CN 2 through 12 intact, normal gait and muscle tone.      Performed in clinic today:  Procedure - Cleaning and debridement of mastoid bowl  The patient was positioned semi-supine in the examination chair.  I examined the mastoid bowl and performed debridement using the magnified speculum on the right ear.  I began by using a cerumen loop to gently peel the squamous debris away from the anterior and inferior aspects of the external canal.  Working my medially, I exposed the tympanic membrane and cleared the debris in an anterior to posterior direction.  Once I was clear of the TM, I then  switched to a #7 suction to debulk as much debris as I could from the mastoid bowl.  Once this was done, I used an alligator forcep to grasp the edge of the impacted mass, and rotated out of the ear in several large pieces.  I inspected the mastoid bowl and it was well epithelialized.  No evidence of cholesteatoma or herniation through the tegmen.  The patient tolerated the procedure well.      A/P - Tiesha Gurrola is a 67 year old female with significant sensorineural hearing loss much more so on the right side essentially nonserviceable ear and still very good word recognition score in the left where she does wear hearing aid.  Also mastoidectomy cavity was cleaned appropriately from debris.  Patient will show up in 6 months for exam of the mastoid cavity possible debridement.      This document serves as a record of the services and decisions personally performed and made by Dr. Jake Solorzano MD. It was created on his behalf by Francisca Caban, a trained medical scribe. The creation of this document is based the provider's statements to the medical scribe.  Francisca Caban 9/19/2018    Provider:   The information in this document, created by the medical scribe for me, accurately reflects the services I personally performed and the decisions made by me. I have reviewed and approved this document for accuracy prior to leaving the patient care area.  Dr. Jake Solorzano MD 9/19/2018    Jake Solorzano MD      Again, thank you for allowing me to participate in the care of your patient.        Sincerely,        Jake Solorzano MD, MD

## 2018-10-01 NOTE — PROGRESS NOTES
Sleep Study Follow-Up Visit:    Date on this visit: 10/4/2018    Tiesha Gurrola comes in today for follow-up of her sleep study done on 9/11/18 at the [facility] for excessive daytime sleepiness, unrefreshed sleep and possible sleep apnea.    Sleep latency 21 minutes with Ambien.  REM achieved.   REM latency 396 minutes.  Sleep efficiency 71%. Total sleep time 344 minutes.    Sleep architecture:  Stage 1, 31% (5%), stage 2, 46% (45-55%), stage 3, 7% (15-20%), stage REM, 16% (20-25%).  AHI was 16.4, without desaturations. RDI 27.7.  REM RDI 6.5, consistent with mild REM RENU.  Supine RDI 31.4, consistent with severe SUPINE RENU.  Periodic Limb Movement Index 21.8/hour.   (without significant arousals). The patient denies RLS symptoms but has severe arthritis and pain and moves a lot.           These findings were reviewed with patient.     Past medical/surgical history, family history, social history, medications and allergies were reviewed.      Problem List:  Patient Active Problem List    Diagnosis Date Noted     Morbid obesity (H) 05/23/2017     Priority: High     Health Care Home 07/20/2011     Priority: High     X    EMERGENCY CARE PLAN  Presenting Problem Signs and Symptoms Treatment Plan    Questions or conerns during clinic hours    I will call the clinic directly     Questions or conerns outside clinic hours    I will call the 24 hour nurse line at [phone]    Patient needs to schedule an appointment    I will call the 24 hour scheduling team at [phone] or clinic directly    Same day treatment     I will call the clinic first, nurse line if after hours, urgent care and express care if needed                            DX V65.8 REPLACED WITH [address] [facility] (04/08/2013)       Polymorphic light eruption 08/01/2017     Priority: Medium     Acromioclavicular joint arthritis 06/09/2017     Priority: Medium     SOB (shortness of breath) 04/05/2017     Priority: Medium      Pruritic disorder 04/05/2017     Priority: Medium     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 She was presented treatment options and wishes to start with dental appliance since she has significant overbite.  She will follow up with me in about 2 month(s).     Fifteen minutes spent with patient, all of which were spent face-to-face counseling, consulting, coordinating plan of care.      Jake Solorzano MD      CC: Jessy David

## 2018-10-04 ENCOUNTER — OFFICE VISIT (OUTPATIENT)
Dept: SLEEP MEDICINE | Facility: CLINIC | Age: 68
End: 2018-10-04
Payer: COMMERCIAL

## 2018-10-04 VITALS
WEIGHT: 233 LBS | HEART RATE: 68 BPM | HEIGHT: 64 IN | BODY MASS INDEX: 39.78 KG/M2 | SYSTOLIC BLOOD PRESSURE: 156 MMHG | DIASTOLIC BLOOD PRESSURE: 75 MMHG | OXYGEN SATURATION: 95 %

## 2018-10-04 DIAGNOSIS — G47.33 OSA (OBSTRUCTIVE SLEEP APNEA): Primary | ICD-10-CM

## 2018-10-04 PROCEDURE — 99213 OFFICE O/P EST LOW 20 MIN: CPT | Performed by: OTOLARYNGOLOGY

## 2018-10-04 NOTE — MR AVS SNAPSHOT
After Visit Summary   10/4/2018    Tiesha Gurrola    MRN: [id number]           Patient Information     Date Of Birth          1950        Visit Information        Provider Department      10/4/2018 2:15 PM Jake Solorzano MD [facility]        Today's Diagnoses     RENU (obstructive sleep apnea)    -  1       Follow-ups after your visit        Additional Services     SLEEP DENTAL REFERRAL       Dental appliance resources recommended by [facility]     Below is a list of dental appliance resources recommended by [facility]   If you wish to choose your own dental sleep dentist, you may identify a provider close to you: [email or web address]    [facility]   Sleep Medicine [facility]   Jatin Kemp DDS, [place] [id number] [place] [address] [address]  [place] [address]   Appointments: [phone]  Fax: [phone]   Email: [email or web address]   Dental and Oral Surgery Clinic   ABIODUN Nuñez DDS   [address] [place] [address], [address]   [place] [address]   Appointments: [phone]   Website: [email or web address]    Snoring and Sleep Apnea   Dental Treatment Center   MICHELLE Lobo DDS  [address]   [place] [address]   Appointments: [phone]   Fax: [phone]   Email: [email or web address]  Website: [email or web address]     [facility]   Office 1   Jeffry Mchugh DDS - [DME Medicare]  [address]   [place] [address]  Appointments: [phone]  Fax: [phone]  Website: Namely    [facility]   Office 2  Jeffry Mchugh DDS - [DME Medicare]  [address]  [place] [address]  Appointments: [phone]  Fax: [phone]  Website: Namely    [facility]    [facility]  Kamari Mcallister DDS  [address] [place] [address]  Appointments: [phone]    [facility]   [place] Office   Nish Madden DDS   [facility]   [address]   [place] [address]   Appointments: [phone]   Fax: [phone]   Website: [employer]     [facility]   [place] Office   Karthik Valentino DDS, MS - [DME Medicare]  [facility]   [address], [address]   [place] [address]   Appointments: [phone]   Fax: [phone]   Website: [employer]     [facility]  Jesús Phillips DMD, MSD - [DME Medicare]  [address], [address]  [place] [address]  Appointments [phone]  Fax: [phone]  Website: [email or web address]    [facility]   [place] Office   Brenda Becker DDS, PhD, MS   [facility]   [address], [address]   [place] [address]   Appointments: [phone]   Fax: [phone]   Website: [employer]     [facility]   [place] Office   Brenda Becker DDS, PhD, MS   [facility]   [address], [address]   [place] [address]   Appointments: [phone]   Fax: [phone]   Website: [employer]     [facility]  Jennifer Deal DDS  [facility]  [address] [place] [address]  Appointments: [phone]   Fax: [phone]    If you wish to choose your own dental sleep dentist, you may identify a provider close to you: [email or web address]      AHI: 16.4 PSG DATE: 9/11/18     Return to clinic in 2 months for Home Sleep Test to confirm adequacy of Treatment.    Other information regarding this referral: Mandibular repositioning appliance for RENU. If your insurance asks for a CPT code, it is .    Please be aware that coverage of these  "services is subject to the terms and limitations of your health insurance plan.  Call member services at your health plan with any benefit or coverage questions.      Please bring the following to your appointment:    >>   List of current medications   >>   This referral request   >>   Any documents/labs given to you for this referral                  Who to contact     If you have questions or need follow up information about today's clinic visit or your schedule please contact [facility] directly at [phone].  Normal or non-critical lab and imaging results will be communicated to you by MyChart, letter or phone within 4 business days after the clinic has received the results. If you do not hear from us within 7 days, please contact the clinic through MyChart or phone. If you have a critical or abnormal lab result, we will notify you by phone as soon as possible.  Submit refill requests through [employer] or call your pharmacy and they will forward the refill request to us. Please allow 3 business days for your refill to be completed.          Additional Information About Your Visit        Care EveryWhere ID     This is your Care EveryWhere ID. This could be used by other organizations to access your [facility] medical records  [id number]        Your Vitals Were     Pulse Height Last Period Pulse Oximetry BMI (Body Mass Index)       68 1.626 m (5' 4\") 05/01/2009 95% 39.99 kg/m2        Blood Pressure from Last 3 Encounters:   10/04/18 156/75   09/19/18 142/84   08/30/18 137/70    Weight from Last 3 Encounters:   10/04/18 105.7 kg (233 lb)   09/19/18 106.1 kg (234 lb)   08/30/18 103.3 kg (227 lb 12.8 oz)              We Performed the Following     SLEEP DENTAL REFERRAL        Primary Care Provider Office Phone # Fax #    Jessy David [phone]       [address]  [facility] [address]        Equal Access to Services     ADALID HIDALGO AH: Jazmine pradhan " Sravani, robertda lujudahadaha, qaybta kaaldaren devlin, judith goyal masonzachariah belltom mikaela. So [facility] [phone].    ATENCIÓN: Si chilo snider, tiene a mahoney disposición servicios gratuitos de asistencia lingüística. Vamshi al [phone].    We comply with applicable federal civil rights laws and [place] laws. We do not discriminate on the basis of race, color, national origin, age, disability, sex, sexual orientation, or gender identity.            Thank you!     Thank you for choosing [facility]  for your care. Our goal is always to provide you with excellent care. Hearing back from our patients is one way we can continue to improve our services. Please take a few minutes to complete the written survey that you may receive in the mail after your visit with us. Thank you!             Your Updated Medication List - Protect others around you: Learn how to safely use, store and throw away your medicines at www.disposemymeds.org.          This list is accurate as of 10/4/18  3:10 PM.  Always use your most recent med list.                   Brand Name Dispense Instructions for use Diagnosis    ACE/ARB/ARNI NOT PRESCRIBED (INTENTIONAL)      Please choose reason not prescribed, below    Major depressive disorder, recurrent episode, moderate (H)       albuterol 108 (90 Base) MCG/ACT inhaler    VENTOLIN HFA    1 Inhaler    Inhale 2 puffs into the lungs every 4 hours as needed for shortness of breath / dyspnea or wheezing    SOB (shortness of breath)       cetirizine 10 MG tablet    zyrTEC    60 tablet    Take 1 tablet (10 mg) by mouth 2 times daily    Pruritic disorder       escitalopram 10 MG tablet    LEXAPRO    90 tablet    Take 1 tablet (10 mg) by mouth daily    Major depressive disorder, recurrent episode, moderate (H)       flecainide acetate 150 MG Tabs     180 tablet    Take 1 tablet (150 mg) by mouth every 12 hours    Paroxysmal atrial fibrillation (H)       HYDROcodone-acetaminophen  5-325 MG per tablet    NORCO    18 tablet    Take 1 tablet by mouth every 4 hours as needed for pain    Stress incontinence in female       hydrOXYzine 25 MG tablet    ATARAX    60 tablet    Take 1-2 tablets (25-50 mg) by mouth nightly as needed    Insomnia, unspecified type       simvastatin 5 MG tablet    ZOCOR    90 tablet    Take 1 tablet (5 mg) by mouth daily    Hyperlipidemia, unspecified hyperlipidemia type       triamcinolone 0.1 % cream    KENALOG    80 g    Apply twice daily as needed    Pruritus, Dermatitis

## 2018-11-27 DIAGNOSIS — I48.0 PAROXYSMAL ATRIAL FIBRILLATION (H): ICD-10-CM

## 2018-11-27 RX ORDER — FLECAINIDE ACETATE 150 MG/1
150 TABLET ORAL EVERY 12 HOURS
Qty: 60 TABLET | Refills: 0 | Status: SHIPPED | OUTPATIENT
Start: 2018-11-27 | End: 2019-01-04

## 2019-01-04 DIAGNOSIS — I48.0 PAROXYSMAL ATRIAL FIBRILLATION (H): ICD-10-CM

## 2019-01-04 RX ORDER — FLECAINIDE ACETATE 150 MG/1
150 TABLET ORAL EVERY 12 HOURS
Qty: 60 TABLET | Refills: 1 | Status: SHIPPED | OUTPATIENT
Start: 2019-01-04 | End: 2019-04-09

## 2019-01-30 NOTE — PROGRESS NOTES
TC patient  Mail stable labs.     Jordan Smith PA-C   Patient/Caregiver provided printed discharge information.

## 2019-04-09 DIAGNOSIS — I48.0 PAROXYSMAL ATRIAL FIBRILLATION (H): ICD-10-CM

## 2019-04-09 RX ORDER — FLECAINIDE ACETATE 150 MG/1
150 TABLET ORAL EVERY 12 HOURS
Qty: 60 TABLET | Refills: 0 | Status: SHIPPED | OUTPATIENT
Start: 2019-04-09 | End: 2019-05-08

## 2019-05-08 ENCOUNTER — OFFICE VISIT (OUTPATIENT)
Dept: CARDIOLOGY | Facility: CLINIC | Age: 69
End: 2019-05-08
Payer: MEDICARE

## 2019-05-08 VITALS
HEIGHT: 64 IN | HEART RATE: 58 BPM | SYSTOLIC BLOOD PRESSURE: 117 MMHG | BODY MASS INDEX: 36.41 KG/M2 | WEIGHT: 213.3 LBS | DIASTOLIC BLOOD PRESSURE: 73 MMHG

## 2019-05-08 DIAGNOSIS — I48.0 PAROXYSMAL ATRIAL FIBRILLATION (H): Primary | ICD-10-CM

## 2019-05-08 PROCEDURE — 93000 ELECTROCARDIOGRAM COMPLETE: CPT | Performed by: INTERNAL MEDICINE

## 2019-05-08 PROCEDURE — 99213 OFFICE O/P EST LOW 20 MIN: CPT | Performed by: INTERNAL MEDICINE

## 2019-05-08 RX ORDER — FLECAINIDE ACETATE 150 MG/1
TABLET ORAL
COMMUNITY
End: 2019-06-03

## 2019-05-08 ASSESSMENT — MIFFLIN-ST. JEOR: SCORE: 1492.52

## 2019-05-08 NOTE — LETTER
5/8/2019    Jessy David MD  [address]  [facility] [address]    RE: Tiesha Gurrola       Dear Colleague,    I had the pleasure of seeing Tiesha Gurrola in the [facility].    HPI and Plan:   Thank you for allowing me to participate in care of this very delightful patient.  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  She underwent isolation of pulmonary veins and empiric ablation of the CTI at that time.    When I saw her last which was more than a year ago I recommend her to taper off the flecainide to truly  the success of the ablation.  Tiesha did reduce the dose of flecainide from 150 the morning to 75 at night and was doing quite well for 2 months until she decided to take 75 mg twice a day.  Unfortunately, with a low dose of 75 mg twice daily  she started to have more palpitations and decided to go back to 150 mg in the morning and 75 mg at night.  She does have a chads Vascor of 2 mm on the left female her age.    Tiesha does still have occasional episodes of palpitations for which she is convinced h.  But she is happy with the overall burden s atrial fibrillation now and would like to continue with her current dose of flecainide.  EKG today demonstrates sinus rhythm with normal QRS width.  Because of her chads Vascor of 2 I would like Tiesha to be on Eliquis 5 mg twice daily.  I gave her a sample of it and asked her to check with her insurance company to see how much o she has to pay if not too expensive II can then prescribe Eliquis for her I did.  I recommended Tiesha to go up on 150 mg  twice daily like it was before if the AF burden keeps increasing.  If she continues to have A. fib despite maximum dose of flecainide then we should consider a second A. fib ablation.        Orders Placed This Encounter    Procedures     Follow-Up with Cardiac Advanced Practice Provider     EKG 12-lead complete w/read - Clinics (performed today)       Orders Placed This Encounter   Medications     flecainide (TAMBOCOR) 150 MG tablet     Sig: Takes 1 tab in am, 1/2 tab in pm       Medications Discontinued During This Encounter   Medication Reason     flecainide (TAMBOCOR) 150 MG tablet Medication Reconciliation Clean Up         Encounter Diagnosis   Name Primary?     Paroxysmal atrial fibrillation (H) Yes       CURRENT MEDICATIONS:  Current Outpatient Medications   Medication Sig Dispense Refill     albuterol (VENTOLIN HFA) 108 (90 BASE) MCG/ACT Inhaler Inhale 2 puffs into the lungs every 4 hours as needed for shortness of breath / dyspnea or wheezing 1 Inhaler 1     cetirizine (ZYRTEC) 10 MG tablet Take 1 tablet (10 mg) by mouth 2 times daily (Patient taking differently: Take 10 mg by mouth daily ) 60 tablet 11     escitalopram (LEXAPRO) 10 MG tablet Take 1 tablet (10 mg) by mouth daily 90 tablet 3     flecainide (TAMBOCOR) 150 MG tablet Takes 1 tab in am, 1/2 tab in pm       hydrOXYzine (ATARAX) 25 MG tablet Take 1-2 tablets (25-50 mg) by mouth nightly as needed 60 tablet 0     simvastatin (ZOCOR) 5 MG tablet Take 1 tablet (5 mg) by mouth daily 90 tablet 3     triamcinolone (KENALOG) 0.1 % cream Apply twice daily as needed 80 g 0     ACE/ARB NOT PRESCRIBED, INTENTIONAL, Please choose reason not prescribed, below       HYDROcodone-acetaminophen (NORCO) 5-325 MG per tablet Take 1 tablet by mouth every 4 hours as needed for pain (Patient not taking: Reported on 9/19/2018) 18 tablet 0       ALLERGIES     Allergies   Allergen Reactions     Oxycodone Nausea and Vomiting       PAST MEDICAL HISTORY:  Past Medical History:   Diagnosis Date     Adhesive capsulitis      Adhesive capsulitis of shoulder 7/30/2013     History of blood transfusion      Hyperlipemia      Mitral valve disorder      Mitral valve disorders(424.0)     Hx of mitral  valve prolapse     OA (osteoarthritis) of hip      Osteoarthritis of acromioclavicular joint 10/17/2012     Paroxysmal atrial fibrillation (H) 7/2008     Paroxysmal supraventricular tachycardia (H)      Rotator cuff tear 10/17/2012     Tobacco abuse        PAST SURGICAL HISTORY:  Past Surgical History:   Procedure Laterality Date     C LAP,UTERUS,UNLISTED PROCEDURE  08/27/2007    Lyis of adhesions of the uterus to the abdominal wall.     C TREAT ECTOPIC PREG,ABD PREG  1974    about 3 mos.     CANALPLASTY Right 6/23/2016    Procedure: CANALPLASTY;  Surgeon: Rishabh Boudreaux MD;  [facility]     COLONOSCOPY  07/11/07     CYSTOSCOPY N/A 8/20/2018    Procedure: CYSTOSCOPY;  cystoscopy, mid uretheral sling;  Surgeon: Kamari Sexton MD;  Location: [facility] OR     DILATION AND CURETTAGE  2/18/16    HD&C     DILATION AND CURETTAGE, OPERATIVE HYSTEROSCOPY, COMBINED N/A 2/18/2016    Procedure: COMBINED DILATION AND CURETTAGE, OPERATIVE HYSTEROSCOPY;  Surgeon: Kehsia Chang DO;  Location: [facility] OR     GRAFT THIERSCH STATUS POST TYMPANOMASTOIDECTOMY Right 6/30/2016    Procedure: GRAFT THIERSCH STATUS POST TYMPANOMASTOIDECTOMY;  Surgeon: Rishabh Boudreaux MD;  Location: [facility] OR     H ABLATION FOCAL AFIB  10/12/16     HC LAPAROSCOPY, SURGICAL; APPENDECTOMY  08/27/2007     JOINT REPLACEMENT, HIP RT/LT Right 8/12/14    Joint Replacement Hip RT/LT     MEATOPLASTY EAR Right 6/23/2016    Procedure: MEATOPLASTY EAR;  Surgeon: Rishabh Boudreaux MD;  [facility]     MYRINGOTOMY Right 4/26/2016    Procedure: MYRINGOTOMY;  Surgeon: Jake Solorzano MD;  [facility]     SHOULDER SURGERY Left 1/7/15    torn bicep, arthroplasty, rotator cuff     SLING TRANSVAGINAL N/A 8/20/2018    Procedure: SLING TRANSVAGINAL;;  Surgeon: Kamari Sexton MD;  [facility]     TYMPANOMASTOIDECTOMY Right 6/23/2016    Procedure: TYMPANOMASTOIDECTOMY;  Surgeon: Rishabh Boudreaux MD;  [facility]     TYMPANOMASTOIDECTOMY  WITH FACIAL MONITORING  2011    Procedure:TYMPANOMASTOIDECTOMY WITH FACIAL MONITORING; right tympanoplasty, mastoidectomy with facial nerve monitoring; Surgeon:EVI LLAMAS; Location:[facility]       FAMILY HISTORY:  Family History   Problem Relation Age of Onset     Allergies Son         Hayfever     Heart Disease Son         Paroxysmal tach.     Arthritis Mother      Depression Mother         depression and anxiety     Respiratory Mother         Asthma     Arthritis Father      Heart Disease Father      Lipids Father      Arrhythmia Father      Pacemaker Father      Heart Surgery Father         bypass x3     Cancer Maternal Grandmother         Breast CA     Cancer Paternal Grandmother         ?? pancreatic CA     Osteoporosis Paternal Grandmother      Neurologic Disorder Daughter         ?? epilepsy     Obesity Daughter      Family History Negative Brother      Family History Negative Son      Family History Negative Brother      Diabetes Other         Maternal cousin --- [email or web address]     EYE* Other         Niece-- lazy eye     Heart Disease Paternal Uncle         death at 56/bypass surgery     Heart Disease Paternal Aunt        SOCIAL HISTORY:  Social History     Socioeconomic History     Marital status:      Spouse name: None     Number of children: 3     Years of education: None     Highest education level: None   Occupational History     Comment:    Social Needs     Financial resource strain: None     Food insecurity:     Worry: None     Inability: None     Transportation needs:     Medical: None     Non-medical: None   Tobacco Use     Smoking status: Former Smoker     Packs/day: 0.25     Years: 32.00     Pack years: 8.00     Types: Cigarettes     Last attempt to quit: 2018     Years since quittin.6     Smokeless tobacco: Never Used     Tobacco comment: 7 cigs a day   Substance and Sexual Activity     Alcohol use: No     Drug use: No     Sexual activity: Yes     Partners: Male  "    Comment: no b/c   Lifestyle     Physical activity:     Days per week: None     Minutes per session: None     Stress: None   Relationships     Social connections:     Talks on phone: None     Gets together: None     Attends Alevism service: None     Active member of club or organization: None     Attends meetings of clubs or organizations: None     Relationship status: None     Intimate partner violence:     Fear of current or ex partner: None     Emotionally abused: None     Physically abused: None     Forced sexual activity: None   Other Topics Concern     Parent/sibling w/ CABG, MI or angioplasty before 65F 55M? No      Service Not Asked     Blood Transfusions Not Asked     Caffeine Concern No     Occupational Exposure No     Hobby Hazards Not Asked     Sleep Concern No     Stress Concern No     Weight Concern No     Special Diet Not Asked     Back Care Not Asked     Exercise No     Bike Helmet Not Asked     Seat Belt Yes     Self-Exams Not Asked   Social History Narrative     None       Review of Systems:  Skin:  Negative       Eyes:  Positive for glasses    ENT:  Negative      Respiratory:  Positive for dyspnea on exertion asthma   Cardiovascular:    Positive for;palpitations;chest pain;lightheadedness;edema    Gastroenterology: Negative      Genitourinary:  Negative      Musculoskeletal:  Positive for joint pain;arthritis    Neurologic:  Positive for headaches    Psychiatric:  Negative      Heme/Lymph/Imm:  Positive for allergies    Endocrine:  Negative        Physical Exam:  Vitals: /73   Pulse 58   Ht 1.626 m (5' 4\")   Wt 96.8 kg (213 lb 4.8 oz)   LMP 05/01/2009   BMI 36.61 kg/m       Constitutional:  cooperative, alert and oriented, well developed, well nourished, in no acute distress        Skin:  warm and dry to the touch, no apparent skin lesions or masses noted          Head:  normocephalic, no masses or lesions        Eyes:  pupils equal and round, conjunctivae and lids " unremarkable, sclera white, no xanthalasma, EOMS intact, no nystagmus        Lymph:No Cervical lymphadenopathy present     ENT:  no pallor or cyanosis, dentition good        Neck:  carotid pulses are full and equal bilaterally, JVP normal, no carotid bruit        Respiratory:  normal breath sounds, clear to auscultation, normal A-P diameter, normal symmetry, normal respiratory excursion, no use of accessory muscles         Cardiac: regular rhythm, normal S1/S2, no S3 or S4, apical impulse not displaced, no murmurs, gallops or rubs                pulses full and equal, no bruits auscultated                                        GI:  abdomen soft, non-tender, BS normoactive, no mass, no HSM, no bruits        Extremities and Muscular Skeletal:  no deformities, clubbing, cyanosis, erythema observed              Neurological:  no gross motor deficits        Psych:           CC  No referring provider defined for this encounter.                Thank you for allowing me to participate in the care of your patient.      Sincerely,     Luis Aguilar MD     [facility]    cc:   No referring provider defined for this encounter.

## 2019-05-08 NOTE — PROGRESS NOTES
HPI and Plan:   Thank you for allowing me to participate in care of this very delightful patient.  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  She underwent isolation of pulmonary veins and empiric ablation of the CTI at that time.    When I saw her last which was more than a year ago I recommend her to taper off the flecainide to truly  the success of the ablation.  Tiesha did reduce the dose of flecainide from 150 the morning to 75 at night and was doing quite well for 2 months until she decided to take 75 mg twice a day.  Unfortunately, with a low dose of 75 mg twice daily  she started to have more palpitations and decided to go back to 150 mg in the morning and 75 mg at night.  She does have a chads Vascor of 2 mm on the left female her age.    Tiesha does still have occasional episodes of palpitations for which she is convinced h.  But she is happy with the overall burden s atrial fibrillation now and would like to continue with her current dose of flecainide.  EKG today demonstrates sinus rhythm with normal QRS width.  Because of her chads Vascor of 2 I would like Tiesha to be on Eliquis 5 mg twice daily.  I gave her a sample of it and asked her to check with her insurance company to see how much o she has to pay if not too expensive II can then prescribe Eliquis for her I did.  I recommended Tiesha to go up on 150 mg  twice daily like it was before if the AF burden keeps increasing.  If she continues to have A. fib despite maximum dose of flecainide then we should consider a second A. fib ablation.        Orders Placed This Encounter   Procedures     Follow-Up with Cardiac Advanced Practice Provider     EKG 12-lead complete w/read - Clinics (performed today)       Orders Placed This Encounter   Medications     flecainide (TAMBOCOR) 150 MG tablet     Sig: Takes  1 tab in am, 1/2 tab in pm       Medications Discontinued During This Encounter   Medication Reason     flecainide (TAMBOCOR) 150 MG tablet Medication Reconciliation Clean Up         Encounter Diagnosis   Name Primary?     Paroxysmal atrial fibrillation (H) Yes       CURRENT MEDICATIONS:  Current Outpatient Medications   Medication Sig Dispense Refill     albuterol (VENTOLIN HFA) 108 (90 BASE) MCG/ACT Inhaler Inhale 2 puffs into the lungs every 4 hours as needed for shortness of breath / dyspnea or wheezing 1 Inhaler 1     cetirizine (ZYRTEC) 10 MG tablet Take 1 tablet (10 mg) by mouth 2 times daily (Patient taking differently: Take 10 mg by mouth daily ) 60 tablet 11     escitalopram (LEXAPRO) 10 MG tablet Take 1 tablet (10 mg) by mouth daily 90 tablet 3     flecainide (TAMBOCOR) 150 MG tablet Takes 1 tab in am, 1/2 tab in pm       hydrOXYzine (ATARAX) 25 MG tablet Take 1-2 tablets (25-50 mg) by mouth nightly as needed 60 tablet 0     simvastatin (ZOCOR) 5 MG tablet Take 1 tablet (5 mg) by mouth daily 90 tablet 3     triamcinolone (KENALOG) 0.1 % cream Apply twice daily as needed 80 g 0     ACE/ARB NOT PRESCRIBED, INTENTIONAL, Please choose reason not prescribed, below       HYDROcodone-acetaminophen (NORCO) 5-325 MG per tablet Take 1 tablet by mouth every 4 hours as needed for pain (Patient not taking: Reported on 9/19/2018) 18 tablet 0       ALLERGIES     Allergies   Allergen Reactions     Oxycodone Nausea and Vomiting       PAST MEDICAL HISTORY:  Past Medical History:   Diagnosis Date     Adhesive capsulitis      Adhesive capsulitis of shoulder 7/30/2013     History of blood transfusion      Hyperlipemia      Mitral valve disorder      Mitral valve disorders(424.0)     Hx of mitral valve prolapse     OA (osteoarthritis) of hip      Osteoarthritis of acromioclavicular joint 10/17/2012     Paroxysmal atrial fibrillation (H) 7/2008     Paroxysmal supraventricular tachycardia (H)      Rotator cuff tear 10/17/2012      Tobacco abuse        PAST SURGICAL HISTORY:  Past Surgical History:   Procedure Laterality Date     C LAP,UTERUS,UNLISTED PROCEDURE  08/27/2007    Lyis of adhesions of the uterus to the abdominal wall.     C TREAT ECTOPIC PREG,ABD PREG  1974    about 3 mos.     CANALPLASTY Right 6/23/2016    Procedure: CANALPLASTY;  Surgeon: Rishabh Boudreaux MD;  [facility]     COLONOSCOPY  07/11/07     CYSTOSCOPY N/A 8/20/2018    Procedure: CYSTOSCOPY;  cystoscopy, mid uretheral sling;  Surgeon: Kamari Sexton MD;  [facility]     DILATION AND CURETTAGE  2/18/16    HD&C     DILATION AND CURETTAGE, OPERATIVE HYSTEROSCOPY, COMBINED N/A 2/18/2016    Procedure: COMBINED DILATION AND CURETTAGE, OPERATIVE HYSTEROSCOPY;  Surgeon: Keshia Chang DO;  [facility]     GRAFT THIERSCH STATUS POST TYMPANOMASTOIDECTOMY Right 6/30/2016    Procedure: GRAFT THIERSCH STATUS POST TYMPANOMASTOIDECTOMY;  Surgeon: Rishabh Boudreaux MD;  [facility]     H ABLATION FOCAL AFIB  10/12/16     HC LAPAROSCOPY, SURGICAL; APPENDECTOMY  08/27/2007     JOINT REPLACEMENT, HIP RT/LT Right 8/12/14    Joint Replacement Hip RT/LT     MEATOPLASTY EAR Right 6/23/2016    Procedure: MEATOPLASTY EAR;  Surgeon: Rishabh Boudreaux MD;  [facility]     MYRINGOTOMY Right 4/26/2016    Procedure: MYRINGOTOMY;  Surgeon: Evi Solorzano MD;  [facility]     SHOULDER SURGERY Left 1/7/15    torn bicep, arthroplasty, rotator cuff     SLING TRANSVAGINAL N/A 8/20/2018    Procedure: SLING TRANSVAGINAL;;  Surgeon: Kamari Sexton MD;  [facility]     TYMPANOMASTOIDECTOMY Right 6/23/2016    Procedure: TYMPANOMASTOIDECTOMY;  Surgeon: Rishabh Boudreaux MD;  [facility]     TYMPANOMASTOIDECTOMY WITH FACIAL MONITORING  12/13/2011    Procedure:TYMPANOMASTOIDECTOMY WITH FACIAL MONITORING; right tympanoplasty, mastoidectomy with facial nerve monitoring; Surgeon:EVI SOLORZANO; [facility]       FAMILY HISTORY:  Family  History   Problem Relation Age of Onset     Allergies Son         Hayfever     Heart Disease Son         Paroxysmal tach.     Arthritis Mother      Depression Mother         depression and anxiety     Respiratory Mother         Asthma     Arthritis Father      Heart Disease Father      Lipids Father      Arrhythmia Father      Pacemaker Father      Heart Surgery Father         bypass x3     Cancer Maternal Grandmother         Breast CA     Cancer Paternal Grandmother         ?? pancreatic CA     Osteoporosis Paternal Grandmother      Neurologic Disorder Daughter         ?? epilepsy     Obesity Daughter      Family History Negative Brother      Family History Negative Son      Family History Negative Brother      Diabetes Other         Maternal cousin --- juev.onset     EYE* Other         Niece-- lazy eye     Heart Disease Paternal Uncle         death at 56/bypass surgery     Heart Disease Paternal Aunt        SOCIAL HISTORY:  Social History     Socioeconomic History     Marital status:      Spouse name: None     Number of children: 3     Years of education: None     Highest education level: None   Occupational History     Comment: school cook   Social Needs     Financial resource strain: None     Food insecurity:     Worry: None     Inability: None     Transportation needs:     Medical: None     Non-medical: None   Tobacco Use     Smoking status: Former Smoker     Packs/day: 0.25     Years: 32.00     Pack years: 8.00     Types: Cigarettes     Last attempt to quit: 2018     Years since quittin.6     Smokeless tobacco: Never Used     Tobacco comment: 7 cigs a day   Substance and Sexual Activity     Alcohol use: No     Drug use: No     Sexual activity: Yes     Partners: Male     Comment: no b/c   Lifestyle     Physical activity:     Days per week: None     Minutes per session: None     Stress: None   Relationships     Social connections:     Talks on phone: None     Gets together: None     Attends  "Bahai service: None     Active member of club or organization: None     Attends meetings of clubs or organizations: None     Relationship status: None     Intimate partner violence:     Fear of current or ex partner: None     Emotionally abused: None     Physically abused: None     Forced sexual activity: None   Other Topics Concern     Parent/sibling w/ CABG, MI or angioplasty before 65F 55M? No      Service Not Asked     Blood Transfusions Not Asked     Caffeine Concern No     Occupational Exposure No     Hobby Hazards Not Asked     Sleep Concern No     Stress Concern No     Weight Concern No     Special Diet Not Asked     Back Care Not Asked     Exercise No     Bike Helmet Not Asked     Seat Belt Yes     Self-Exams Not Asked   Social History Narrative     None       Review of Systems:  Skin:  Negative       Eyes:  Positive for glasses    ENT:  Negative      Respiratory:  Positive for dyspnea on exertion asthma   Cardiovascular:    Positive for;palpitations;chest pain;lightheadedness;edema    Gastroenterology: Negative      Genitourinary:  Negative      Musculoskeletal:  Positive for joint pain;arthritis    Neurologic:  Positive for headaches    Psychiatric:  Negative      Heme/Lymph/Imm:  Positive for allergies    Endocrine:  Negative        Physical Exam:  Vitals: /73   Pulse 58   Ht 1.626 m (5' 4\")   Wt 96.8 kg (213 lb 4.8 oz)   LMP 05/01/2009   BMI 36.61 kg/m      Constitutional:  cooperative, alert and oriented, well developed, well nourished, in no acute distress        Skin:  warm and dry to the touch, no apparent skin lesions or masses noted          Head:  normocephalic, no masses or lesions        Eyes:  pupils equal and round, conjunctivae and lids unremarkable, sclera white, no xanthalasma, EOMS intact, no nystagmus        Lymph:No Cervical lymphadenopathy present     ENT:  no pallor or cyanosis, dentition good        Neck:  carotid pulses are full and equal bilaterally, JVP " normal, no carotid bruit        Respiratory:  normal breath sounds, clear to auscultation, normal A-P diameter, normal symmetry, normal respiratory excursion, no use of accessory muscles         Cardiac: regular rhythm, normal S1/S2, no S3 or S4, apical impulse not displaced, no murmurs, gallops or rubs                pulses full and equal, no bruits auscultated                                        GI:  abdomen soft, non-tender, BS normoactive, no mass, no HSM, no bruits        Extremities and Muscular Skeletal:  no deformities, clubbing, cyanosis, erythema observed              Neurological:  no gross motor deficits        Psych:           CC  No referring provider defined for this encounter.

## 2019-05-08 NOTE — LETTER
5/8/2019    Jessy David MD  [address]  [place] [address]    RE: Tiesha Gurrola       Dear Colleague,    I had the pleasure of seeing Tiesha Gurrola in the [facility].    HPI and Plan:   Thank you for allowing me to participate in care of this very delightful patient.  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  She underwent isolation of pulmonary veins and empiric ablation of the CTI at that time.    When I saw her last which was more than a year ago I recommend her to taper off the flecainide to truly  the success of the ablation.  Tiesha did reduce the dose of flecainide from 150 the morning to 75 at night and was doing quite well for 2 months until she decided to take 75 mg twice a day.  Unfortunately, with a low dose of 75 mg twice daily  she started to have more palpitations and decided to go back to 150 mg in the morning and 75 mg at night.  She does have a chads Vascor of 2 mm on the left female her age.    Tiesha does still have occasional episodes of palpitations for which she is convinced h.  But she is happy with the overall burden s atrial fibrillation now and would like to continue with her current dose of flecainide.  EKG today demonstrates sinus rhythm with normal QRS width.  Because of her chads Vascor of 2 I would like Tiesha to be on Eliquis 5 mg twice daily.  I gave her a sample of it and asked her to check with her insurance company to see how much o she has to pay if not too expensive II can then prescribe Eliquis for her I did.  I recommended Tiesha to go up on 150 mg  twice daily like it was before if the AF burden keeps increasing.  If she continues to have A. fib despite maximum dose of flecainide then we should consider a second A. fib ablation.        Orders Placed This Encounter    Procedures     Follow-Up with Cardiac Advanced Practice Provider     EKG 12-lead complete w/read - Clinics (performed today)       Orders Placed This Encounter   Medications     flecainide (TAMBOCOR) 150 MG tablet     Sig: Takes 1 tab in am, 1/2 tab in pm       Medications Discontinued During This Encounter   Medication Reason     flecainide (TAMBOCOR) 150 MG tablet Medication Reconciliation Clean Up         Encounter Diagnosis   Name Primary?     Paroxysmal atrial fibrillation (H) Yes       CURRENT MEDICATIONS:  Current Outpatient Medications   Medication Sig Dispense Refill     albuterol (VENTOLIN HFA) 108 (90 BASE) MCG/ACT Inhaler Inhale 2 puffs into the lungs every 4 hours as needed for shortness of breath / dyspnea or wheezing 1 Inhaler 1     cetirizine (ZYRTEC) 10 MG tablet Take 1 tablet (10 mg) by mouth 2 times daily (Patient taking differently: Take 10 mg by mouth daily ) 60 tablet 11     escitalopram (LEXAPRO) 10 MG tablet Take 1 tablet (10 mg) by mouth daily 90 tablet 3     flecainide (TAMBOCOR) 150 MG tablet Takes 1 tab in am, 1/2 tab in pm       hydrOXYzine (ATARAX) 25 MG tablet Take 1-2 tablets (25-50 mg) by mouth nightly as needed 60 tablet 0     simvastatin (ZOCOR) 5 MG tablet Take 1 tablet (5 mg) by mouth daily 90 tablet 3     triamcinolone (KENALOG) 0.1 % cream Apply twice daily as needed 80 g 0     ACE/ARB NOT PRESCRIBED, INTENTIONAL, Please choose reason not prescribed, below       HYDROcodone-acetaminophen (NORCO) 5-325 MG per tablet Take 1 tablet by mouth every 4 hours as needed for pain (Patient not taking: Reported on 9/19/2018) 18 tablet 0       ALLERGIES     Allergies   Allergen Reactions     Oxycodone Nausea and Vomiting       PAST MEDICAL HISTORY:  Past Medical History:   Diagnosis Date     Adhesive capsulitis      Adhesive capsulitis of shoulder 7/30/2013     History of blood transfusion      Hyperlipemia      Mitral valve disorder      Mitral valve disorders(424.0)     Hx of mitral  valve prolapse     OA (osteoarthritis) of hip      Osteoarthritis of acromioclavicular joint 10/17/2012     Paroxysmal atrial fibrillation (H) 7/2008     Paroxysmal supraventricular tachycardia (H)      Rotator cuff tear 10/17/2012     Tobacco abuse        PAST SURGICAL HISTORY:  Past Surgical History:   Procedure Laterality Date     C LAP,UTERUS,UNLISTED PROCEDURE  08/27/2007    Lyis of adhesions of the uterus to the abdominal wall.     C TREAT ECTOPIC PREG,ABD PREG  1974    about 3 mos.     CANALPLASTY Right 6/23/2016    Procedure: CANALPLASTY;  Surgeon: Rishabh Boudreaux MD;  [facility]     COLONOSCOPY  07/11/07     CYSTOSCOPY N/A 8/20/2018    Procedure: CYSTOSCOPY;  cystoscopy, mid uretheral sling;  Surgeon: Kamari Sexotn MD;  [facility]     DILATION AND CURETTAGE  2/18/16    HD&C     DILATION AND CURETTAGE, OPERATIVE HYSTEROSCOPY, COMBINED N/A 2/18/2016    Procedure: COMBINED DILATION AND CURETTAGE, OPERATIVE HYSTEROSCOPY;  Surgeon: Keshia Chang DO;  [facility]     GRAFT THIERSCH STATUS POST TYMPANOMASTOIDECTOMY Right 6/30/2016    Procedure: GRAFT THIERSCH STATUS POST TYMPANOMASTOIDECTOMY;  Surgeon: Rishabh Boudreaux MD;  [facility]     H ABLATION FOCAL AFIB  10/12/16     HC LAPAROSCOPY, SURGICAL; APPENDECTOMY  08/27/2007     JOINT REPLACEMENT, HIP RT/LT Right 8/12/14    Joint Replacement Hip RT/LT     MEATOPLASTY EAR Right 6/23/2016    Procedure: MEATOPLASTY EAR;  Surgeon: Rishabh Boudreaux MD;  [facility]     MYRINGOTOMY Right 4/26/2016    Procedure: MYRINGOTOMY;  Surgeon: Jake Solorzano MD;  [facility]     SHOULDER SURGERY Left 1/7/15    torn bicep, arthroplasty, rotator cuff     SLING TRANSVAGINAL N/A 8/20/2018    Procedure: SLING TRANSVAGINAL;;  Surgeon: Kamari Sexton MD;  [facility]     TYMPANOMASTOIDECTOMY Right 6/23/2016    Procedure: TYMPANOMASTOIDECTOMY;  Surgeon: Rishabh Boudreaux MD;  [facility]     TYMPANOMASTOIDECTOMY  WITH FACIAL MONITORING  2011    Procedure:TYMPANOMASTOIDECTOMY WITH FACIAL MONITORING; right tympanoplasty, mastoidectomy with facial nerve monitoring; Surgeon:EVI LLAMAS; [facility]       FAMILY HISTORY:  Family History   Problem Relation Age of Onset     Allergies Son         Hayfever     Heart Disease Son         Paroxysmal tach.     Arthritis Mother      Depression Mother         depression and anxiety     Respiratory Mother         Asthma     Arthritis Father      Heart Disease Father      Lipids Father      Arrhythmia Father      Pacemaker Father      Heart Surgery Father         bypass x3     Cancer Maternal Grandmother         Breast CA     Cancer Paternal Grandmother         ?? pancreatic CA     Osteoporosis Paternal Grandmother      Neurologic Disorder Daughter         ?? epilepsy     Obesity Daughter      Family History Negative Brother      Family History Negative Son      Family History Negative Brother      Diabetes Other         Maternal cousin --- juev.onset     EYE* Other         Niece-- lazy eye     Heart Disease Paternal Uncle         death at 56/bypass surgery     Heart Disease Paternal Aunt        SOCIAL HISTORY:  Social History     Socioeconomic History     Marital status:      Spouse name: None     Number of children: 3     Years of education: None     Highest education level: None   Occupational History     Comment:    Social Needs     Financial resource strain: None     Food insecurity:     Worry: None     Inability: None     Transportation needs:     Medical: None     Non-medical: None   Tobacco Use     Smoking status: Former Smoker     Packs/day: 0.25     Years: 32.00     Pack years: 8.00     Types: Cigarettes     Last attempt to quit: 2018     Years since quittin.6     Smokeless tobacco: Never Used     Tobacco comment: 7 cigs a day   Substance and Sexual Activity     Alcohol use: No     Drug use: No     Sexual activity: Yes     Partners: Male  "    Comment: no b/c   Lifestyle     Physical activity:     Days per week: None     Minutes per session: None     Stress: None   Relationships     Social connections:     Talks on phone: None     Gets together: None     Attends Rastafarian service: None     Active member of club or organization: None     Attends meetings of clubs or organizations: None     Relationship status: None     Intimate partner violence:     Fear of current or ex partner: None     Emotionally abused: None     Physically abused: None     Forced sexual activity: None   Other Topics Concern     Parent/sibling w/ CABG, MI or angioplasty before 65F 55M? No      Service Not Asked     Blood Transfusions Not Asked     Caffeine Concern No     Occupational Exposure No     Hobby Hazards Not Asked     Sleep Concern No     Stress Concern No     Weight Concern No     Special Diet Not Asked     Back Care Not Asked     Exercise No     Bike Helmet Not Asked     Seat Belt Yes     Self-Exams Not Asked   Social History Narrative     None       Review of Systems:  Skin:  Negative       Eyes:  Positive for glasses    ENT:  Negative      Respiratory:  Positive for dyspnea on exertion asthma   Cardiovascular:    Positive for;palpitations;chest pain;lightheadedness;edema    Gastroenterology: Negative      Genitourinary:  Negative      Musculoskeletal:  Positive for joint pain;arthritis    Neurologic:  Positive for headaches    Psychiatric:  Negative      Heme/Lymph/Imm:  Positive for allergies    Endocrine:  Negative        Physical Exam:  Vitals: /73   Pulse 58   Ht 1.626 m (5' 4\")   Wt 96.8 kg (213 lb 4.8 oz)   LMP 05/01/2009   BMI 36.61 kg/m       Constitutional:  cooperative, alert and oriented, well developed, well nourished, in no acute distress        Skin:  warm and dry to the touch, no apparent skin lesions or masses noted          Head:  normocephalic, no masses or lesions        Eyes:  pupils equal and round, conjunctivae and lids " unremarkable, sclera white, no xanthalasma, EOMS intact, no nystagmus        Lymph:No Cervical lymphadenopathy present     ENT:  no pallor or cyanosis, dentition good        Neck:  carotid pulses are full and equal bilaterally, JVP normal, no carotid bruit        Respiratory:  normal breath sounds, clear to auscultation, normal A-P diameter, normal symmetry, normal respiratory excursion, no use of accessory muscles         Cardiac: regular rhythm, normal S1/S2, no S3 or S4, apical impulse not displaced, no murmurs, gallops or rubs                pulses full and equal, no bruits auscultated                                        GI:  abdomen soft, non-tender, BS normoactive, no mass, no HSM, no bruits        Extremities and Muscular Skeletal:  no deformities, clubbing, cyanosis, erythema observed              Neurological:  no gross motor deficits        Psych:             Thank you for allowing me to participate in the care of your patient.    Sincerely,     Luis Aguilar MD     [facility]

## 2019-06-03 DIAGNOSIS — I48.91 ATRIAL FIBRILLATION (H): Primary | ICD-10-CM

## 2019-06-03 RX ORDER — FLECAINIDE ACETATE 150 MG/1
150 TABLET ORAL EVERY 12 HOURS
Qty: 180 TABLET | Refills: 1 | Status: SHIPPED | OUTPATIENT
Start: 2019-06-03 | End: 2019-12-30

## 2019-07-12 DIAGNOSIS — E78.5 HYPERLIPIDEMIA, UNSPECIFIED HYPERLIPIDEMIA TYPE: ICD-10-CM

## 2019-07-12 DIAGNOSIS — F33.1 MAJOR DEPRESSIVE DISORDER, RECURRENT EPISODE, MODERATE (H): ICD-10-CM

## 2019-07-12 NOTE — LETTER
July 18, 2019      Tiesha Gurrola  [address] [facility] [address] [address]        Dear Tiesha,     We have tried to reach you by phone and have been unsuccessful. This letter is to inform you that we received a refill request of your Lexapro and it has been refilled for one time only. You are due to be seen for a physical/mood follow up with Dr. David before any further refills. Please call the clinic at [phone] to schedule an appointment.    Please let us know if you have any further questions or concerns.    Thanks,  [facility]

## 2019-07-15 RX ORDER — SIMVASTATIN 5 MG
TABLET ORAL
Qty: 30 TABLET | Refills: 0 | Status: SHIPPED | OUTPATIENT
Start: 2019-07-15 | End: 2019-08-23

## 2019-07-15 RX ORDER — ESCITALOPRAM OXALATE 10 MG/1
TABLET ORAL
Qty: 30 TABLET | Refills: 0 | Status: SHIPPED | OUTPATIENT
Start: 2019-07-15 | End: 2019-08-23

## 2019-07-15 NOTE — TELEPHONE ENCOUNTER
Xin given, please schedule physical with TC     NAVI Rudolph CNP  Questions or concerns please feel free to send me a [employer] message or call me  Phone : [phone]

## 2019-07-15 NOTE — TELEPHONE ENCOUNTER
Pending Prescriptions:                       Disp   Refills    escitalopram (LEXAPRO) 10 MG tablet [Phar*90 tab*3            Sig: TAKE ONE TABLET BY MOUTH ONCE DAILY     PHQ-9 SCORE 3/17/2017 5/23/2017 6/1/2018   PHQ-9 Total Score - - -   PHQ-9 Total Score MyChart - 3 (Minimal depression) -   PHQ-9 Total Score 6 - 3     Routing refill request to provider for review/approval because:  Labs not current:  PHQ9       simvastatin (ZOCOR) 5 MG tablet [Pharmacy*90 tab*3            Sig: TAKE ONE TABLET BY MOUTH ONCE DAILY      Routing refill request to provider for review/approval because:  Labs not current:  Lipids  LOB 8/10/2018    Daysi Alcaraz, RN, BSN

## 2019-07-16 NOTE — TELEPHONE ENCOUNTER
LM for patient to return phone call to clinic about message below.  Maribel Bob CMA ([facility])

## 2019-08-19 NOTE — PROGRESS NOTES
"Subjective     Tiesha Gurrola is a 68 year old female who presents to clinic today for the following health issues:      Hyperlipidemia:  She presents for follow up of hyperlipidemia.  She is taking medication to lower cholesterol. She is not having myalgia or other side effects to statin medications.She is not reporting shortness of breath, increased sweating or nausea with activity, left-sided neck or arm pain, chest pain or pressure, or pain in calves when walking 1-2 blocks.     Annual Wellness Visit    Are you in the first 12 months of your Medicare Part B coverage?  No    Physical Health:    In general, how would you rate your overall physical health? fair    If you drink alcohol do you typically have >3 drinks per day or >7 drinks per week? No    Do you usually eat at least 4 servings of fruit and vegetables a day, include whole grains & fiber and avoid regularly eating high fat or \"junk\" foods? Yes    Needs assistance for the following daily activities: no assistance needed    Which of the following safety concerns are present in your home?  none identified     Hearing impairment: Yes, Wears hearing Aides    In the past 6 months, have you been bothered by leaking of urine? yes    Mental Health:    In general, how would you rate your overall mental or emotional health? fair  PHQ-2 Score:      Do you feel safe in your environment? Yes    Do you have a Health Care Directive? No: Advance care planning was reviewed with patient; patient declined at this time.    Fall risk:  Fallen 2 or more times in the past year?: No  Any fall with injury in the past year?: No    Cognitive Screenin) Repeat 3 items (Leader, Season, Table)    2) Clock draw: NORMAL  3) 3 item recall: Recalls 3 objects  Results: 3 items recalled: COGNITIVE IMPAIRMENT LESS LIKELY    Mini-CogTM Copyright MONIKA Varela. Licensed by the author for use in [facility]; reprinted with permission (jeremias@.[facility]). All rights reserved.  "     Current providers sharing in care for this patient include:   Patient Care Team:  Jessy David MD as PCP - General  Jessy David MD as Assigned PCP    Patient Active Problem List   Diagnosis     Mitral valve disorder     Anxiety     Major depressive disorder, recurrent episode, moderate (H)     Health Care Home     SVT (supraventricular tachycardia) (H)     Hyperlipidemia     Paroxysmal atrial fibrillation (H)     SOB (shortness of breath)     Pruritic disorder     Dermatographism     Morbid obesity (H)     Acromioclavicular joint arthritis     Polymorphic light eruption     Obstructive sleep apnea syndrome     Past Surgical History:   Procedure Laterality Date     C LAP,UTERUS,UNLISTED PROCEDURE  08/27/2007    Lyis of adhesions of the uterus to the abdominal wall.     C TREAT ECTOPIC PREG,ABD PREG  1974    about 3 mos.     CANALPLASTY Right 6/23/2016    Procedure: CANALPLASTY;  Surgeon: Rishabh Boudreaux MD;  [facility]     COLONOSCOPY  07/11/07     CYSTOSCOPY N/A 8/20/2018    Procedure: CYSTOSCOPY;  cystoscopy, mid uretheral sling;  Surgeon: Kamari Sexton MD;  Location: [facility]     DILATION AND CURETTAGE  2/18/16    HD&C     DILATION AND CURETTAGE, OPERATIVE HYSTEROSCOPY, COMBINED N/A 2/18/2016    Procedure: COMBINED DILATION AND CURETTAGE, OPERATIVE HYSTEROSCOPY;  Surgeon: Keshia Chang DO;  Location: [facility] OR     GRAFT THIERSCH STATUS POST TYMPANOMASTOIDECTOMY Right 6/30/2016    Procedure: GRAFT THIERSCH STATUS POST TYMPANOMASTOIDECTOMY;  Surgeon: Rishabh Boudreaux MD;  [facility]: [facility]     H ABLATION FOCAL AFIB  10/12/16     HC LAPAROSCOPY, SURGICAL; APPENDECTOMY  08/27/2007     JOINT REPLACEMENT, HIP RT/LT Right 8/12/14    Joint Replacement Hip RT/LT     MEATOPLASTY EAR Right 6/23/2016    Procedure: MEATOPLASTY EAR;  Surgeon: Rishabh Boudreaux MD;  [facility]     MYRINGOTOMY Right 4/26/2016    Procedure: MYRINGOTOMY;  Surgeon: Jake Solorzano MD;  Location:  [facility]     SHOULDER SURGERY Left 1/7/15    torn bicep, arthroplasty, rotator cuff     SLING TRANSVAGINAL N/A 2018    Procedure: SLING TRANSVAGINAL;;  Surgeon: Kamari Sexton MD;  [facility]     TYMPANOMASTOIDECTOMY Right 2016    Procedure: TYMPANOMASTOIDECTOMY;  Surgeon: Rishabh Boudreaux MD;  [facility]     TYMPANOMASTOIDECTOMY WITH FACIAL MONITORING  2011    Procedure:TYMPANOMASTOIDECTOMY WITH FACIAL MONITORING; right tympanoplasty, mastoidectomy with facial nerve monitoring; Surgeon:EVI LLAMAS; Location:[facility]       Social History     Tobacco Use     Smoking status: Former Smoker     Packs/day: 0.25     Years: 32.00     Pack years: 8.00     Types: Cigarettes     Last attempt to quit: 2018     Years since quittin.9     Smokeless tobacco: Never Used     Tobacco comment: 7 cigs a day   Substance Use Topics     Alcohol use: No     Family History   Problem Relation Age of Onset     Allergies Son         Hayfever     Heart Disease Son         Paroxysmal tach.     Arthritis Mother      Depression Mother         depression and anxiety     Respiratory Mother         Asthma     Arthritis Father      Heart Disease Father      Lipids Father      Arrhythmia Father      Pacemaker Father      Heart Surgery Father         bypass x3     Cancer Maternal Grandmother         Breast CA     Cancer Paternal Grandmother         ?? pancreatic CA     Osteoporosis Paternal Grandmother      Neurologic Disorder Daughter         ?? epilepsy     Obesity Daughter      Family History Negative Brother      Family History Negative Son      Family History Negative Brother      Diabetes Other         Maternal cousin --- [email or web address]     EYE* Other         Niece-- lazy eye     Heart Disease Paternal Uncle         death at 56/bypass surgery     Heart Disease Paternal Aunt            Reviewed and updated as needed this visit by Provider  Tobacco  Allergies  Meds  Problems         Review of Systems    ROS COMP: CONSTITUTIONAL: NEGATIVE for fever, chills, change in weight  ENT/MOUTH: NEGATIVE for ear, mouth and throat problems.  POSITIVE for hearing loss  RESP: NEGATIVE for significant cough or shortness of breath   CV: NEGATIVE for chest pain, palpitations or peripheral edema      Objective    /80 (BP Location: Left arm, Patient Position: Chair, Cuff Size: Adult Large)   Pulse 86   Temp 98.2  F (36.8  C) (Temporal)   Resp 20   Wt 96.6 kg (213 lb)   LMP 05/01/2009   SpO2 100%   Breastfeeding? No   BMI 36.56 kg/m    Body mass index is 36.56 kg/m .  Physical Exam   Constitutional: She is oriented to person, place, and time. She appears well-developed and well-nourished. No distress.   HENT:   Right Ear: Tympanic membrane and external ear normal. Decreased hearing is noted.   Left Ear: Tympanic membrane and external ear normal. Decreased hearing is noted.   Nose: Nose normal.   Mouth/Throat: Oropharynx is clear and moist. No oropharyngeal exudate.   Forgot her hearing aids today   Eyes: Pupils are equal, round, and reactive to light. Conjunctivae are normal. Right eye exhibits no discharge. Left eye exhibits no discharge.   Neck: Neck supple. No tracheal deviation present. No thyromegaly present.   Cardiovascular: Normal rate, regular rhythm, S1 normal, S2 normal, normal heart sounds and normal pulses. Exam reveals no S3, no S4 and no friction rub.   No murmur heard.  Pulmonary/Chest: Effort normal and breath sounds normal. No respiratory distress. She has no wheezes. She has no rales.   Abdominal: Soft. There is no hepatosplenomegaly.   Musculoskeletal: Normal range of motion. She exhibits no edema.   Lymphadenopathy:     She has no cervical adenopathy.   Neurological: She is alert and oriented to person, place, and time. She has normal strength and normal reflexes. She exhibits normal muscle tone.   Skin: Skin is warm and dry. No rash noted.   Psychiatric: She has a normal mood and affect. Judgment  "and thought content normal. Cognition and memory are normal.            Assessment & Plan     1. Encounter for Medicare annual wellness exam      2. Hyperlipidemia, unspecified hyperlipidemia type  Remains on statin, labs drawn, refills given.    - Lipid panel reflex to direct LDL Fasting  - Comprehensive metabolic panel  - simvastatin (ZOCOR) 5 MG tablet; Take 1 tablet (5 mg) by mouth daily  Dispense: 90 tablet; Refill: 3    3. Major depressive disorder, recurrent episode, moderate (H)  Stable.  Frustrated that her  wants to move and she doesn't want to.    - escitalopram (LEXAPRO) 10 MG tablet; Take 1 tablet (10 mg) by mouth daily  Dispense: 90 tablet; Refill: 3    4. Need for vaccination against Streptococcus pneumoniae    - Pneumococcal vaccine 23 valent PPSV23  (Pneumovax) [62972]    5. Morbid obesity (H)  Encouraged her to continue weight loss, she has been exercising and eating less.       Tobacco Cessation:   reports that she has been smoking cigarettes.  She has a 8.00 pack-year smoking history. She has never used smokeless tobacco.  Tobacco Cessation Action Plan: Information offered: Patient not interested at this time      BMI:   Estimated body mass index is 36.56 kg/m  as calculated from the following:    Height as of 5/8/19: 1.626 m (5' 4\").    Weight as of this encounter: 96.6 kg (213 lb).   Weight management plan: Discussed healthy diet and exercise guidelines      Return in about 1 year (around 8/23/2020) for Physical Exam.    Jessy David MD  [facility]"

## 2019-08-23 ENCOUNTER — OFFICE VISIT (OUTPATIENT)
Dept: FAMILY MEDICINE | Facility: OTHER | Age: 69
End: 2019-08-23
Payer: MEDICARE

## 2019-08-23 VITALS
DIASTOLIC BLOOD PRESSURE: 80 MMHG | OXYGEN SATURATION: 100 % | HEART RATE: 86 BPM | WEIGHT: 213 LBS | BODY MASS INDEX: 36.56 KG/M2 | RESPIRATION RATE: 20 BRPM | SYSTOLIC BLOOD PRESSURE: 132 MMHG | TEMPERATURE: 98.2 F

## 2019-08-23 DIAGNOSIS — E78.5 HYPERLIPIDEMIA, UNSPECIFIED HYPERLIPIDEMIA TYPE: ICD-10-CM

## 2019-08-23 DIAGNOSIS — Z00.00 ENCOUNTER FOR MEDICARE ANNUAL WELLNESS EXAM: Primary | ICD-10-CM

## 2019-08-23 DIAGNOSIS — F33.1 MAJOR DEPRESSIVE DISORDER, RECURRENT EPISODE, MODERATE (H): ICD-10-CM

## 2019-08-23 DIAGNOSIS — E66.01 MORBID OBESITY (H): ICD-10-CM

## 2019-08-23 DIAGNOSIS — Z23 NEED FOR VACCINATION AGAINST STREPTOCOCCUS PNEUMONIAE: ICD-10-CM

## 2019-08-23 PROBLEM — G47.33 OBSTRUCTIVE SLEEP APNEA SYNDROME: Status: ACTIVE | Noted: 2018-11-01

## 2019-08-23 LAB
ALBUMIN SERPL-MCNC: 3.7 G/DL (ref 3.4–5)
ALP SERPL-CCNC: 68 U/L (ref 40–150)
ALT SERPL W P-5'-P-CCNC: 19 U/L (ref 0–50)
ANION GAP SERPL CALCULATED.3IONS-SCNC: 10 MMOL/L (ref 3–14)
AST SERPL W P-5'-P-CCNC: 12 U/L (ref 0–45)
BILIRUB SERPL-MCNC: 0.4 MG/DL (ref 0.2–1.3)
BUN SERPL-MCNC: 17 MG/DL (ref 7–30)
CALCIUM SERPL-MCNC: 8.8 MG/DL (ref 8.5–10.1)
CHLORIDE SERPL-SCNC: 111 MMOL/L (ref 94–109)
CHOLEST SERPL-MCNC: 218 MG/DL
CO2 SERPL-SCNC: 24 MMOL/L (ref 20–32)
CREAT SERPL-MCNC: 0.72 MG/DL (ref 0.52–1.04)
GFR SERPL CREATININE-BSD FRML MDRD: 87 ML/MIN/{1.73_M2}
GLUCOSE SERPL-MCNC: 78 MG/DL (ref 70–99)
HDLC SERPL-MCNC: 43 MG/DL
LDLC SERPL CALC-MCNC: 135 MG/DL
NONHDLC SERPL-MCNC: 175 MG/DL
POTASSIUM SERPL-SCNC: 4.2 MMOL/L (ref 3.4–5.3)
PROT SERPL-MCNC: 7.3 G/DL (ref 6.8–8.8)
SODIUM SERPL-SCNC: 145 MMOL/L (ref 133–144)
TRIGL SERPL-MCNC: 198 MG/DL

## 2019-08-23 PROCEDURE — 36415 COLL VENOUS BLD VENIPUNCTURE: CPT | Performed by: FAMILY MEDICINE

## 2019-08-23 PROCEDURE — 80061 LIPID PANEL: CPT | Performed by: FAMILY MEDICINE

## 2019-08-23 PROCEDURE — G0009 ADMIN PNEUMOCOCCAL VACCINE: HCPCS | Performed by: FAMILY MEDICINE

## 2019-08-23 PROCEDURE — 80053 COMPREHEN METABOLIC PANEL: CPT | Performed by: FAMILY MEDICINE

## 2019-08-23 PROCEDURE — 99213 OFFICE O/P EST LOW 20 MIN: CPT | Mod: 25 | Performed by: FAMILY MEDICINE

## 2019-08-23 PROCEDURE — G0438 PPPS, INITIAL VISIT: HCPCS | Performed by: FAMILY MEDICINE

## 2019-08-23 PROCEDURE — 90732 PPSV23 VACC 2 YRS+ SUBQ/IM: CPT | Performed by: FAMILY MEDICINE

## 2019-08-23 RX ORDER — ESCITALOPRAM OXALATE 10 MG/1
10 TABLET ORAL DAILY
Qty: 90 TABLET | Refills: 3 | Status: SHIPPED | OUTPATIENT
Start: 2019-08-23 | End: 2020-06-05

## 2019-08-23 RX ORDER — SIMVASTATIN 5 MG
5 TABLET ORAL DAILY
Qty: 90 TABLET | Refills: 3 | Status: SHIPPED | OUTPATIENT
Start: 2019-08-23 | End: 2019-08-27

## 2019-08-23 ASSESSMENT — ANXIETY QUESTIONNAIRES
2. NOT BEING ABLE TO STOP OR CONTROL WORRYING: SEVERAL DAYS
3. WORRYING TOO MUCH ABOUT DIFFERENT THINGS: SEVERAL DAYS
7. FEELING AFRAID AS IF SOMETHING AWFUL MIGHT HAPPEN: NOT AT ALL
5. BEING SO RESTLESS THAT IT IS HARD TO SIT STILL: SEVERAL DAYS
1. FEELING NERVOUS, ANXIOUS, OR ON EDGE: SEVERAL DAYS
6. BECOMING EASILY ANNOYED OR IRRITABLE: SEVERAL DAYS
GAD7 TOTAL SCORE: 6

## 2019-08-23 ASSESSMENT — PATIENT HEALTH QUESTIONNAIRE - PHQ9: 5. POOR APPETITE OR OVEREATING: SEVERAL DAYS

## 2019-08-23 NOTE — PATIENT INSTRUCTIONS
Patient Education   Personalized Prevention Plan  You are due for the preventive services outlined below.  Your care team is available to assist you in scheduling these services.  If you have already completed any of these items, please share that information with your care team to update in your medical record.  Health Maintenance Due   Topic Date Due     Zoster (Shingles) Vaccine (1 of 2) 11/25/2002     Depression Assessment  12/01/2018     FALL RISK ASSESSMENT  06/01/2019     Pneumococcal Vaccine (2 of 2 - PPSV23) 06/01/2019     Comprehensive Metabolic Panel  06/13/2019     Cholesterol Lab  06/13/2019

## 2019-08-24 ASSESSMENT — ANXIETY QUESTIONNAIRES: GAD7 TOTAL SCORE: 6

## 2019-08-27 ENCOUNTER — TELEPHONE (OUTPATIENT)
Dept: FAMILY MEDICINE | Facility: OTHER | Age: 69
End: 2019-08-27

## 2019-08-27 DIAGNOSIS — E78.5 HYPERLIPIDEMIA, UNSPECIFIED HYPERLIPIDEMIA TYPE: ICD-10-CM

## 2019-08-27 RX ORDER — SIMVASTATIN 10 MG
10 TABLET ORAL DAILY
Qty: 90 TABLET | Refills: 3 | Status: SHIPPED | OUTPATIENT
Start: 2019-08-27 | End: 2020-09-04

## 2019-08-27 NOTE — TELEPHONE ENCOUNTER
LM for patient to return phone call to clinic about message below.  Maribel Bob CMA ([facility])    ----- Message from Jessy David MD sent at 8/27/2019  8:11 AM CDT -----  Your cholesterol is increasing.  I would recommend increasing her simvastatin to 10 mg daily.  Electronically signed by Jessy David MD on 8/27/2019

## 2019-09-19 ENCOUNTER — ANCILLARY PROCEDURE (OUTPATIENT)
Dept: MAMMOGRAPHY | Facility: OTHER | Age: 69
End: 2019-09-19
Attending: FAMILY MEDICINE
Payer: MEDICARE

## 2019-09-19 DIAGNOSIS — Z12.31 VISIT FOR SCREENING MAMMOGRAM: ICD-10-CM

## 2019-09-19 PROCEDURE — 77067 SCR MAMMO BI INCL CAD: CPT | Mod: TC

## 2019-12-30 DIAGNOSIS — I48.91 ATRIAL FIBRILLATION (H): ICD-10-CM

## 2019-12-30 RX ORDER — FLECAINIDE ACETATE 150 MG/1
150 TABLET ORAL EVERY 12 HOURS
Qty: 180 TABLET | Refills: 1 | Status: SHIPPED | OUTPATIENT
Start: 2019-12-30 | End: 2020-03-25

## 2020-03-25 DIAGNOSIS — I48.91 ATRIAL FIBRILLATION (H): ICD-10-CM

## 2020-03-25 RX ORDER — FLECAINIDE ACETATE 150 MG/1
150 TABLET ORAL EVERY 12 HOURS
Qty: 180 TABLET | Refills: 0 | Status: SHIPPED | OUTPATIENT
Start: 2020-03-25 | End: 2020-04-02

## 2020-04-02 DIAGNOSIS — I48.91 ATRIAL FIBRILLATION (H): ICD-10-CM

## 2020-04-02 RX ORDER — FLECAINIDE ACETATE 150 MG/1
150 TABLET ORAL EVERY 12 HOURS
Qty: 180 TABLET | Refills: 0 | Status: SHIPPED | OUTPATIENT
Start: 2020-04-02 | End: 2020-10-13

## 2020-04-28 DIAGNOSIS — I48.91 ATRIAL FIBRILLATION (H): Primary | ICD-10-CM

## 2020-06-05 DIAGNOSIS — F33.1 MAJOR DEPRESSIVE DISORDER, RECURRENT EPISODE, MODERATE (H): ICD-10-CM

## 2020-06-05 RX ORDER — ESCITALOPRAM OXALATE 10 MG/1
TABLET ORAL
Qty: 90 TABLET | Refills: 0 | Status: SHIPPED | OUTPATIENT
Start: 2020-06-05 | End: 2020-09-04

## 2020-06-17 NOTE — PROGRESS NOTES
"Tiesha Gurrola is a 69 year old female who is being evaluated via a billable telephone visit.      The patient has been notified of following:     \"This telephone visit will be conducted via a call between you and your physician/provider. We have found that certain health care needs can be provided without the need for a physical exam.  This service lets us provide the care you need with a short phone conversation.  If a prescription is necessary we can send it directly to your pharmacy.  If lab work is needed we can place an order for that and you can then stop by our lab to have the test done at a later time.    Telephone visits are billed at different rates depending on your insurance coverage. During this emergency period, for some insurers they may be billed the same as an in-person visit.  Please reach out to your insurance provider with any questions.    If during the course of the call the physician/provider feels a telephone visit is not appropriate, you will not be charged for this service.\"    Patient has given verbal consent for Telephone visit?  Yes    What phone number would you like to be contacted at? [phone]    How would you like to obtain your AVS? Mail a copy    CC:  Atrial Fibrillation    VITALS:  Patient unable to assess vitals    BRIEF HPI:  70 yo F who sees Dr. Vega for her h/o:    1. Sx'c paroxysmal AFib - refractory to flecainide, s/p PVI, SVC isolation, and empiric ablation of CTI 10/2016.Of note, EPS report also indicates SVC isolation done but in no other subsequent notes. Recurrent palpitations (likely AFib) and back on flecainide.  2. Chronic AC for CHADSVASc 2 (age, sex). Has not been on Eliquis due to cost  3. RENU - not on CPAP. Was recommended for treatment.  4. Dyslipidemia - followed by PCP    Dr. Vega saw her 5/2019 at which time she was doing well, comfortable with her recurrent AFib burden on flecainide 150 mg in AM/75 mg at night. Dr. Vega noted that repeat ablation could " "be done if she continued with palpitations despite max dose flecainide. She's now here for annual follow-up.     INTERVAL HISTORY:  Notes that in 11/2019, she had \"a bad AFib episode\" intermittently in arrhythmia for an entire week back in 11/2019 and then recently had another episode a few days ago.  Otherwise, no arrhythmias lasting longer than 10 seconds. Remains on flecainide 150 mg BID. She's thinking she is going to need a repeat ablation as AFib is occurring more often than it was last year.    Never took the Eliquis as it was too expensive. She takes ASA only. CHADSVASc 2 - note recent recommendation change and in females, anticoagulation recommended for CHADSVASc 3 or more.    No c/o CP, SOB. No edema, orthopnea or PND.    DIAGNOSTICS:  EKG 5/2019 SB 58 bpm with QRS duration 118 ms  Echo 2007 with normal EF. No valve issues.    REVIEW OF SYSTEMS:  Negative except as noted above     PHYSICAL EXAM:  Deferred, telephone    ASSESSMENT/PLAN:    1. Paroxysmal AFib    S/p PVI (with empiric CTI vs SVC isolation - some discrepancy is op report) 10/2016    Remains on flecainide 150 mg BID but having more breakthrough     Remains on AC with Eliquis for CHADSVASc 2 (age, sex). Reviewed newest guidelines     PLAN:    Given increasing breakthroughs of AFib, interested in repeat ablation with Dr. Vega, but not until August based on her schedule    Will remain on ASA and flecainide until then. Will message Dr. Vega to ensure no CT/CHEMA required    Will have a phone visit with me beforehand to review    Encouraged to get CPAP as had been instructed to after oral appliance was not tolerate.    I have reviewed the note as documented above.  This accurately captures the substance of my conversation with the patient.        Phone call contact time  Call Started at 1253  Call Ended at 1311  Duration 18 minutes       Debi Nur PA-C [facility]      "

## 2020-06-18 ENCOUNTER — VIRTUAL VISIT (OUTPATIENT)
Dept: CARDIOLOGY | Facility: CLINIC | Age: 70
End: 2020-06-18
Attending: INTERNAL MEDICINE
Payer: MEDICARE

## 2020-06-18 ENCOUNTER — DOCUMENTATION ONLY (OUTPATIENT)
Dept: CARDIOLOGY | Facility: CLINIC | Age: 70
End: 2020-06-18

## 2020-06-18 DIAGNOSIS — I48.0 PAROXYSMAL ATRIAL FIBRILLATION (H): Primary | ICD-10-CM

## 2020-06-18 PROCEDURE — 99442 ZZC PHYSICIAN TELEPHONE EVALUATION 11-20 MIN: CPT | Performed by: PHYSICIAN ASSISTANT

## 2020-06-18 RX ORDER — ASPIRIN 81 MG/1
81 TABLET ORAL DAILY
COMMUNITY
Start: 2020-06-18 | End: 2020-06-18

## 2020-06-18 NOTE — LETTER
"6/18/2020    Jessy David MD  [address]  [place] [address]    RE: Tiesha Gurrola       Dear Colleague,    I had the pleasure of seeing Tiesha Gurrola in the [facility].    Tiesha Gurrola is a 69 year old female who is being evaluated via a billable telephone visit.        BRIEF HPI:  70 yo F who sees Dr. Vega for her h/o:    1. Sx'c paroxysmal AFib - refractory to flecainide, s/p PVI, SVC isolation, and empiric ablation of CTI 10/2016.Of note, EPS report also indicates SVC isolation done but in no other subsequent notes. Recurrent palpitations (likely AFib) and back on flecainide.  2. Chronic AC for CHADSVASc 2 (age, sex). Has not been on Eliquis due to cost  3. RENU - not on CPAP. Was recommended for treatment.  4. Dyslipidemia - followed by PCP    Dr. Vega saw her 5/2019 at which time she was doing well, comfortable with her recurrent AFib burden on flecainide 150 mg in AM/75 mg at night. Dr. Vega noted that repeat ablation could be done if she continued with palpitations despite max dose flecainide. She's now here for annual follow-up.     INTERVAL HISTORY:  Notes that in 11/2019, she had \"a bad AFib episode\" intermittently in arrhythmia for an entire week back in 11/2019 and then recently had another episode a few days ago.  Otherwise, no arrhythmias lasting longer than 10 seconds. Remains on flecainide 150 mg BID. She's thinking she is going to need a repeat ablation as AFib is occurring more often than it was last year.    Never took the Eliquis as it was too expensive. She takes ASA only. CHADSVASc 2 - note recent recommendation change and in females, anticoagulation recommended for CHADSVASc 3 or more.    No c/o CP, SOB. No edema, orthopnea or PND.    DIAGNOSTICS:  EKG 5/2019 SB 58 bpm with QRS duration 118 ms  Echo 2007 with normal EF. No valve issues.    REVIEW OF SYSTEMS:  Negative except as noted above     PHYSICAL EXAM:  Deferred, " telephone    ASSESSMENT/PLAN:    1. Paroxysmal AFib    S/p PVI (with empiric CTI vs SVC isolation - some discrepancy is op report) 10/2016    Remains on flecainide 150 mg BID but having more breakthrough     Remains on AC with Eliquis for CHADSVASc 2 (age, sex). Reviewed newest guidelines     PLAN:    Given increasing breakthroughs of AFib, interested in repeat ablation with Dr. Vega, but not until August based on her schedule    Will remain on ASA and flecainide until then. Will message Dr. Vega to ensure no CT/CHEMA required    Will have a phone visit with me beforehand to review    Encouraged to get CPAP as had been instructed to after oral appliance was not tolerate.    I have reviewed the note as documented above.  This accurately captures the substance of my conversation with the patient.        Phone call contact time  Call Started at 1253  Call Ended at 1311  Duration 18 minutes       Thank you for allowing me to participate in the care of your patient.    Sincerely,     Debi Nur PA-C     [facility]

## 2020-06-18 NOTE — PATIENT INSTRUCTIONS
Darshan - it was nice to speak with you today!      1. We reviewed that you're having more breakthrough AFib despite flecainide 150 mg twice a day  2. Otherwise, it seems like you're doing well    PLAN:  1. Opted to scheduled repeat ablation with Dr. Vega ~8/2020  2. Another phone visit with me beforehand to review  3. GET and USE CPAP!!!!  Untreated sleep apnea is a major cause of recurrent AFib!!  4. I'll touch base with Dr. Vega to make sure he agrees with everything!    If you need anything, pls call Dr. Vega/Gabi's nurses DARSHAN CHEEK & PAT: [phone]    Gabi

## 2020-06-18 NOTE — PROGRESS NOTES
"Pls call Mary Carmen and let her know I d/w Dr. Vega.  B/c it's been so long since her last \"bad\" AFib episode (11/2019), he wants to make sure we know what rhythms we're dealing with.    Pls have her wear 2 week ZP before her ablation (which Gayatri will contact her about b/c she'd like to do it in August).    I have ordered.    Thx - Gabi    I don't think we  have documented what arrhythmias she is having. Let us get 2 wks zio if we know for sure freq of sxs because depending of what arrhythmias she is having type of ablation would depend on it. On the other hand if freq unpredictable and she is absolute certain then please go ahead and order afib ablation. Thanks qp    Message text      Luis Vega MD Moody, Debi Cuadra, EFRAÍN    Caller: Unspecified (Today,  1:19 PM)               No need for keke or ct if paf.        "

## 2020-06-18 NOTE — PROGRESS NOTES
Dr. Vega - I had virtual visit with Mary Carmen today.  She's had increasing episodes of AFib despite flecainide 150 mg BID. You had previously mentioned doing another ablation on her (first PVI and SVC vs CTI (some discrepancy in op report) was done 10/2016)    1) Let me know if you want her to hold flecainide 150  before ablation  2) CT done 10/2016 - pls let me know if you need a new one  3) CHADSVASc 2 - Eliquis was $$ so only on .  Pls let me know if you want a CHEMA. Has had pAFib, most recently with episodes lasting a few minutes. This past 11/2019, had longer episodes lasting hours.    Elisabeth Ashley

## 2020-06-18 NOTE — PROGRESS NOTES
Message left for patient to review recommendations per AVIS Mota.  Awaiting return call.  FRANCES Robert

## 2020-06-19 NOTE — PROGRESS NOTES
6/19 Second attempt- Attempted to call pt but reached MICHELLE MARADIAGA and requested callback. David 930 am

## 2020-06-22 NOTE — PROGRESS NOTES
6/22/20 Spoke w patient who is agreeable to 2 week ZP to determine exact type of arrhythmia prior to Ablation. Pt transferred to scheduling to facilitate. No further questions at this time. David 9 am

## 2020-06-26 ENCOUNTER — TELEPHONE (OUTPATIENT)
Dept: CARDIOLOGY | Facility: CLINIC | Age: 70
End: 2020-06-26

## 2020-06-26 NOTE — TELEPHONE ENCOUNTER
PATIENT WELLNESS TELEPHONE SCREENING     Step 1 Screening Questions    In the past 3 weeks, have you been exposed to someone with a suspected or known illness?  COVID-19? No  Chickenpox? No   Measles? No  Pertussis? No    In the past 2 weeks, have you had any of the following symptoms?   Fever/Chills? No   Cough? No   Shortness of breath? No   New loss of taste or smell? No  Sore throat? No  Muscle or body aches? No  Headaches? No  Fatigue? No  Vomiting or diarrhea? No    Step 2 Screening Results (Skip if the patient is negative for symptoms)    If the patient is positive for new or worsening symptoms, contact the ordering provider to determine if the procedure is deemed necessary. Determine if patient can be re-scheduled when the patient is symptom free or has a negative COVID test.     If ordering provider deems the procedure is necessary, notify your manager/supervisor. Provide the patient with the procedural department phone number and inform the patient to call the procedural department upon arrival.  The patient will be registered over the phone.    Step 3 Review Visitor Policy  Patient informed of the updated visitor policy   1 visitor allowed per patient   Visitor must screen negative for COVID symptoms   Visitor must wear a mask    Toyin Munguia

## 2020-06-29 ENCOUNTER — HOSPITAL ENCOUNTER (OUTPATIENT)
Dept: CARDIOLOGY | Facility: CLINIC | Age: 70
Discharge: HOME | End: 2020-06-29
Attending: PHYSICIAN ASSISTANT | Admitting: PHYSICIAN ASSISTANT
Payer: MEDICARE

## 2020-06-29 DIAGNOSIS — I48.0 PAROXYSMAL ATRIAL FIBRILLATION (H): ICD-10-CM

## 2020-06-29 PROCEDURE — 0296T ZIO PATCH HOLTER ADULT PEDIATRIC GREATER THAN 48 HRS: CPT

## 2020-06-29 PROCEDURE — 0298T ZIO PATCH HOLTER ADULT PEDIATRIC GREATER THAN 48 HRS: CPT | Performed by: INTERNAL MEDICINE

## 2020-07-22 ENCOUNTER — DOCUMENTATION ONLY (OUTPATIENT)
Dept: CARDIOLOGY | Facility: CLINIC | Age: 70
End: 2020-07-22

## 2020-07-22 DIAGNOSIS — I48.0 PAROXYSMAL ATRIAL FIBRILLATION (H): Primary | ICD-10-CM

## 2020-07-22 NOTE — PROGRESS NOTES
"Spoke to patient regarding results of ZP worn (6/29-7/13). and recommendations.    1. NO AFib... when I saw her 6/18 she c/o more breakthrough AFib despite flecainide 150 BID. When she was wearing the monitor, did she think she had any AFib at all??     2. We had initially talked about repeat AFib ablation (last done 2016) but based on this monitor, doesn't look like she had AFib at all. Longest episode was less than 5 seconds.      3. ZP showed 16 episodes of SVT ... these were all asx'c based on monitor and seemed to occur between midnight and 2 AM     4. \"Triggered\" episodes were almost all SR ... a few had PACs     At this point, would NOT set her up for AFib ablation b/c we've not proven she's had recurrent AFib.  Let me know how she's feeling and how she was doing while wearing the monitor.       Patient reports feeling well.  She will keep diary of any events and will call if having problems.  Patient provided verbal understanding regarding above.  FRANCES Cruz     "

## 2020-07-22 NOTE — PROGRESS NOTES
Elisabeth for update. I have cancelled orders for pre-op with me and AFib ablation.    Placed order for 6 m follow-up with me (virtual OK).    Jeanette Ashley

## 2020-07-22 NOTE — PROGRESS NOTES
"Pls let Tiesha know that her ZioPatch  (6/29-7/13).    1. NO AFib... when I saw her 6/18 she c/o more breakthrough AFib despite flecainide 150 BID. When she was wearing the monitor, did she think she had any AFib at all??    2. We had initially talked about repeat AFib ablation (last done 2016) but based on this monitor, doesn't look like she had AFib at all. Longest episode was less than 5 seconds.     3. ZP showed 16 episodes of SVT ... these were all asx'c based on monitor and seemed to occur between midnight and 2 AM    4. \"Triggered\" episodes were almost all SR ... a few had PACs    At this point, would NOT set her up for AFib ablation b/c we've not proven she's had recurrent AFib.  Let me know how she's feeling and how she was doing while wearing the monitor.    Elisabeth Ashley  "

## 2020-09-03 NOTE — PROGRESS NOTES
"Tiesha Gurrola is a 69 year old female who is being evaluated via a billable telephone visit.      The patient has been notified of following:     \"This telephone visit will be conducted via a call between you and your physician/provider. We have found that certain health care needs can be provided without the need for a physical exam.  This service lets us provide the care you need with a short phone conversation.  If a prescription is necessary we can send it directly to your pharmacy.  If lab work is needed we can place an order for that and you can then stop by our lab to have the test done at a later time.    Telephone visits are billed at different rates depending on your insurance coverage. During this emergency period, for some insurers they may be billed the same as an in-person visit.  Please reach out to your insurance provider with any questions.    If during the course of the call the physician/provider feels a telephone visit is not appropriate, you will not be charged for this service.\"    Patient has given verbal consent for Telephone visit?  Yes    What phone number would you like to be contacted at? [phone]    How would you like to obtain your AVS? Mail a copy    Subjective     Tiesha Gurrola is a 69 year old female who presents via phone visit today for the following health issues:    HPI    Depression and Anxiety Follow-Up    How are you doing with your depression since your last visit? Improved     How are you doing with your anxiety since your last visit?  Improved     Are you having other symptoms that might be associated with depression or anxiety? No    Have you had a significant life event? No     Do you have any concerns with your use of alcohol or other drugs? No    Social History     Tobacco Use     Smoking status: Current Every Day Smoker     Packs/day: 0.25     Years: 32.00     Pack years: 8.00     Types: Cigarettes     Last attempt to quit: 8/28/2018     Years since " quittin.0     Smokeless tobacco: Never Used     Tobacco comment: 7 cigs a day   Substance Use Topics     Alcohol use: No     Drug use: No     PHQ 3/17/2017 2018 2020   PHQ-9 Total Score 6 3 3   Q9: Thoughts of better off dead/self-harm past 2 weeks Not at all Not at all Not at all     GAGE-7 SCORE 2017   Total Score - - -   Total Score 3 (minimal anxiety) - -   Total Score - 1 6       Suicide Assessment Five-step Evaluation and Treatment (SAFE-T)      Review of Systems   CONSTITUTIONAL: NEGATIVE for fever, chills, change in weight  RESP: NEGATIVE for significant cough or shortness of breath   CV: NEGATIVE for chest pain, palpitations or peripheral edema       Objective          Vitals:  No vitals were obtained today due to virtual visit.    Gen: hard of hearing, alert and no distress  PSYCH: Alert and oriented times 3; coherent speech, normal   rate and volume, able to articulate logical thoughts, able   to abstract reason, no tangential thoughts, no hallucinations   or delusions  Her affect is normal  RESP: No cough, no audible wheezing, able to talk in full sentences  Remainder of exam unable to be completed due to telephone visits          Assessment/Plan:    Assessment & Plan     Major depressive disorder, recurrent episode, moderate (H)  Stable.  We discussed continuing at this dose versus tapering down.  Because of all the situational stuff happening in the world we decided to stay on this dose.  She does admit to some hair falling out and would like to have her thyroid checked as well.    - escitalopram (LEXAPRO) 10 MG tablet; Take 1 tablet (10 mg) by mouth daily  - **TSH with free T4 reflex FUTURE anytime; Future    Hyperlipidemia, unspecified hyperlipidemia type  She remains on statin.  She does follow with cardiology.  She is due for fasting labs.    - COMPREHENSIVE METABOLIC PANEL; Future  - Lipid panel reflex to direct LDL Fasting; Future  - **TSH with free T4 reflex  FUTURE anytime; Future  - simvastatin (ZOCOR) 10 MG tablet; Take 1 tablet (10 mg) by mouth daily       Return in about 1 year (around 9/4/2021) for Routine Visit.    Jessy David MD  [facility]    Phone call duration:  6 minutes

## 2020-09-04 ENCOUNTER — VIRTUAL VISIT (OUTPATIENT)
Dept: FAMILY MEDICINE | Facility: OTHER | Age: 70
End: 2020-09-04
Payer: MEDICARE

## 2020-09-04 ENCOUNTER — TELEPHONE (OUTPATIENT)
Dept: FAMILY MEDICINE | Facility: OTHER | Age: 70
End: 2020-09-04

## 2020-09-04 DIAGNOSIS — E78.5 HYPERLIPIDEMIA, UNSPECIFIED HYPERLIPIDEMIA TYPE: ICD-10-CM

## 2020-09-04 DIAGNOSIS — F33.1 MAJOR DEPRESSIVE DISORDER, RECURRENT EPISODE, MODERATE (H): Primary | ICD-10-CM

## 2020-09-04 PROCEDURE — 99441 ZZC PHYSICIAN TELEPHONE EVALUATION 5-10 MIN: CPT | Mod: 95 | Performed by: FAMILY MEDICINE

## 2020-09-04 RX ORDER — SIMVASTATIN 10 MG
10 TABLET ORAL DAILY
Qty: 90 TABLET | Refills: 3 | Status: SHIPPED | OUTPATIENT
Start: 2020-09-04 | End: 2021-11-15

## 2020-09-04 RX ORDER — CETIRIZINE HYDROCHLORIDE 10 MG/1
10 TABLET ORAL DAILY
COMMUNITY
Start: 2020-09-04

## 2020-09-04 RX ORDER — ESCITALOPRAM OXALATE 10 MG/1
10 TABLET ORAL DAILY
Qty: 90 TABLET | Refills: 3 | Status: SHIPPED | OUTPATIENT
Start: 2020-09-04 | End: 2021-11-05

## 2020-09-04 ASSESSMENT — PATIENT HEALTH QUESTIONNAIRE - PHQ9: SUM OF ALL RESPONSES TO PHQ QUESTIONS 1-9: 3

## 2020-09-04 NOTE — TELEPHONE ENCOUNTER
Left message for patient to call back. Please help her schedule a fasting lab appointment if she calls back.  Sheryl Chacon CMA

## 2020-09-04 NOTE — LETTER
[facility]  [address]  [place] [address]  Phone: [phone]    09/08/20    Tiesha Gurrola  [address] [address]          Tiesha,     We have tried reaching you via phone and have been unsuccessful.     We are writing to inform you that you are due for a fasting lab appointment.     Please give the clinic a call at [phone] and we can assist you with scheduling.     Sincerely,  Your [facility] Care Team

## 2020-10-13 DIAGNOSIS — I48.91 ATRIAL FIBRILLATION (H): ICD-10-CM

## 2020-10-13 RX ORDER — FLECAINIDE ACETATE 150 MG/1
150 TABLET ORAL EVERY 12 HOURS
Qty: 180 TABLET | Refills: 1 | Status: SHIPPED | OUTPATIENT
Start: 2020-10-13 | End: 2021-04-07

## 2020-11-18 ENCOUNTER — TELEPHONE (OUTPATIENT)
Dept: FAMILY MEDICINE | Facility: OTHER | Age: 70
End: 2020-11-18

## 2020-11-18 NOTE — TELEPHONE ENCOUNTER
Summary:    Patient is due/failing the following:   Medicare annual wellness visit, CMP and LDL    Action needed:   Patient needs office visit for Medicare annual wellness visit. and Patient needs fasting lab only appointment    Type of outreach:    Phone, left message for patient to call back.     Questions for provider review:    None                                                                                                                                    Ramandeep Velarde [facility]       Chart routed to Care Team .        Panel Management Review      Patient has the following on her problem list:     Depression / Dysthymia review    Measure:  Needs PHQ-9 score of 4 or less during index window.  Administer PHQ-9 and if score is 5 or more, send encounter to provider for next steps.      PHQ-9 SCORE 5/23/2017 6/1/2018 9/4/2020   PHQ-9 Total Score - - -   PHQ-9 Total Score MyChart 3 (Minimal depression) - -   PHQ-9 Total Score - 3 3       If PHQ-9 recheck is 5 or more, route to provider for next steps.    Patient is due for:  PHQ9      Composite cancer screening  Chart review shows that this patient is due/due soon for the following None

## 2020-11-18 NOTE — LETTER
[facility]  [address]  [place] [address]  Phone: [phone]  December 22, 2020      Tiesha Gurrola  [address] [address] [address]      Dear Tiesha,    We care about your health and have reviewed your health plan including your medical conditions, medications, and lab results.  Based on this review, it is recommended that you follow up regarding the following health topic(s):  -Wellness (Physical) Visit     We recommend you take the following action(s):  -schedule a WELLNESS (Physical) APPOINTMENT.  We will perform the following labs: Lipids (fasting cholesterol - nothing to eat except water and/or meds for 8-10 hours) and CMP(complete metabolic panel).     Please call us at the [facility] - [phone] (or use [employer]) to address the above recommendations.     Thank you for trusting [facility] and we appreciate the opportunity to serve you.  We look forward to supporting your healthcare needs in the future.    Healthy Regards,    Your Health Care Team  [facility]

## 2021-04-06 ENCOUNTER — TELEPHONE (OUTPATIENT)
Dept: CARDIOLOGY | Facility: CLINIC | Age: 71
End: 2021-04-06

## 2021-04-06 DIAGNOSIS — I48.91 ATRIAL FIBRILLATION (H): ICD-10-CM

## 2021-04-06 NOTE — PROGRESS NOTES
"SUBJECTIVE:   Tiesha Gurrola is a 70 year old female who presents for Preventive Visit.    Patient has been advised of split billing requirements and indicates understanding: Yes   Are you in the first 12 months of your Medicare coverage?  No    Healthy Habits:     In general, how would you rate your overall health?  Fair    Frequency of exercise:  1 day/week    Duration of exercise:  Less than 15 minutes    Do you usually eat at least 4 servings of fruit and vegetables a day, include whole grains    & fiber and avoid regularly eating high fat or \"junk\" foods?  No    Taking medications regularly:  Yes    Medication side effects:  None and No significant flushing    Ability to successfully perform activities of daily living:  No assistance needed    Home Safety:  No safety concerns identified    Hearing Impairment:  Need to ask people to speak up or repeat themselves    In the past 6 months, have you been bothered by leaking of urine? Yes    In general, how would you rate your overall mental or emotional health?  Good      PHQ-2 Total Score: 0    Additional concerns today:  No     Answers for HPI/ROS submitted by the patient on 4/9/2021   Annual Exam:  If you checked off any problems, how difficult have these problems made it for you to do your work, take care of things at home, or get along with other people?: Not difficult at all  PHQ9 TOTAL SCORE: 3  GAGE 7 TOTAL SCORE: 0    Feet/leg swelling and they turn dark purple when she sits. Toes feel cold and numb, especially in left foot. She notes that the leg swelling worsens when her shortness of breath worsens. This tends to resolve on its own and leg elevation. She reports some pitting to the edema on the worst days. She notes a diet high in salt and little water intake. She denies pain in her legs, erythema or redness in the legs  She notes the color changes in toes and feet are normal for her but now the swelling is a new concern and the color changes seem to be " extending upward.  There is no pain in the lower extremities otherwise.  She hasn't had any problem with varicose veins.     She quit smoking in January and has noticed that her shortness of breath has improved since stopping. Has tried the albuterol inhaler a couple of times. Her shortness of breath is worse with activity as well and recently noticed that it occasionally comes on with rest. She denies chest pain and episodes of atrial fibrillation with the shortness of breath. She also denies PND or orthopnea. She is very aware of her Atrial fibrillation and reports that her last episode was in December when she was eating a lot of chocolate and when she was smoking more she would have more episodes of a fib.       Pain in lower back - intermittent for the last year but steady for the last three months. Cleaning and after about 20 mins she has to sit. achey and sharp, used to be in the middle of the back, now it is all the way across. She denies numbness, tingling, saddle paresthesia, weakness, trauma. She notes that the pain comes on with activity and resolves with rest.     Do you feel safe in your environment? Yes    Have you ever done Advance Care Planning? (For example, a Health Directive, POLST, or a discussion with a medical provider or your loved ones about your wishes): No, advance care planning information given to patient to review.  Advanced care planning was discussed at today's visit.      Fall risk  Fallen 2 or more times in the past year?: (P) No  Any fall with injury in the past year?: (P) No    Cognitive Screening   1) Repeat 3 items (Leader, Season, Table)    2) Clock draw: NORMAL  3) 3 item recall: Recalls 3 objects  Results: 3 items recalled: COGNITIVE IMPAIRMENT LESS LIKELY    Mini-CogTM Copyright S Nichole. Licensed by the author for use in [facility]; reprinted with permission (jeremias@.[email or web address]). All rights reserved.      Do you have sleep apnea, excessive snoring or daytime  drowsiness?: yes she has had a sleep study and was recommended to have the mouth guard. She did not like how sore her jaw was after the use of this during the sleep study and called them and did not want to proceed with that. She is more interested in the CPAP and would like to look into getting one.     Reviewed and updated as needed this visit by clinical staff  Tobacco  Allergies  Meds  Problems  Med Hx  Surg Hx  Fam Hx  Soc Hx          Reviewed and updated as needed this visit by Provider  Tobacco  Allergies  Meds  Problems  Med Hx  Surg Hx  Fam Hx         Social History     Tobacco Use     Smoking status: Former Smoker     Packs/day: 0.25     Years: 32.00     Pack years: 8.00     Types: Cigarettes     Quit date: 2021     Years since quittin.2     Smokeless tobacco: Never Used     Tobacco comment: 7 cigs a day   Substance Use Topics     Alcohol use: No     Alcohol Use 2021   Prescreen: >3 drinks/day or >7 drinks/week? Not Applicable   Prescreen: >3 drinks/day or >7 drinks/week? -     Current providers sharing in care for this patient include:   Patient Care Team:  Jessy David MD as PCP - General  Jessy David MD as Assigned PCP  Debi uNr PA-C as Assigned Heart and Vascular Provider    The following health maintenance items are reviewed in Epic and correct as of today:  Health Maintenance Due   Topic Date Due     COVID-19 Vaccine (1) Never done     ZOSTER IMMUNIZATION (1 of 2) Never done     INFLUENZA VACCINE (1) 2020     PHQ-9  2021     Lab work is in process  Mammogram Screening: Mammogram Screening: Recommended mammography every 1-2 years with patient discussion and risk factor consideration  Any new diagnosis of family breast, ovarian, or bowel cancer? Yes, Aunt on her father's side was just diagnosed       FSH-7:   Breast CA Risk Assessment (FHS-7) 2021   Did any of your first-degree relatives have breast or ovarian cancer? Yes  "  Did any of your relatives have bilateral breast cancer? Unknown   Did any man in your family have breast cancer? No   Did any woman in your family have breast and ovarian cancer? No   Did any woman in your family have breast cancer before age 50 y? No   Do you have 2 or more relatives with breast and/or ovarian cancer? Yes   Do you have 2 or more relatives with breast and/or bowel cancer? No       Review of Systems   Constitutional: Negative for chills and fever.   HENT: Positive for hearing loss.    Respiratory: Positive for shortness of breath. Negative for cough, chest tightness and wheezing.    Cardiovascular: Positive for peripheral edema. Negative for chest pain.   Gastrointestinal: Positive for constipation. Negative for abdominal pain.   Breasts:  Negative for tenderness, breast mass and discharge.   Genitourinary: Positive for frequency. Negative for difficulty urinating, flank pain, pelvic pain, vaginal bleeding and vaginal discharge.   Musculoskeletal: Positive for back pain (see hpi). Negative for gait problem, joint swelling, myalgias, neck pain and neck stiffness.   Neurological: Negative for dizziness, weakness, numbness and paresthesias (including saddle).   Psychiatric/Behavioral: Negative for confusion.       OBJECTIVE:   /88   Pulse 62   Temp 97.4  F (36.3  C) (Temporal)   Resp 16   Ht 1.635 m (5' 4.37\")   Wt 109.6 kg (241 lb 9.6 oz)   LMP 05/01/2009   BMI 41.00 kg/m   Estimated body mass index is 41 kg/m  as calculated from the following:    Height as of this encounter: 1.635 m (5' 4.37\").    Weight as of this encounter: 109.6 kg (241 lb 9.6 oz).  Physical Exam  Constitutional:       Appearance: Normal appearance. She is obese.   HENT:      Head: Normocephalic and atraumatic.   Eyes:      Pupils: Pupils are equal, round, and reactive to light.   Neck:      Musculoskeletal: Normal range of motion and neck supple.   Cardiovascular:      Rate and Rhythm: Normal rate and regular " rhythm.      Chest Wall: PMI is not displaced.      Pulses:           Dorsalis pedis pulses are 1+ on the right side and 1+ on the left side.        Posterior tibial pulses are 2+ on the right side and 2+ on the left side.      Heart sounds: Normal heart sounds. No murmur.   Pulmonary:      Effort: Pulmonary effort is normal. No accessory muscle usage or respiratory distress.      Breath sounds: Normal breath sounds. No decreased breath sounds, wheezing, rhonchi or rales.   Musculoskeletal:      Right lower le+ Pitting Edema present.      Left lower le+ Pitting Edema present.   Feet:      Comments: Midfoot to toes dusky to purple when resting and resolves slightly with foot elevation.  Neurological:      Mental Status: She is alert.         Diagnostic Test Results:  Labs reviewed in Epic  No results found for any visits on 21.    ASSESSMENT / PLAN:   1. Encounter for Medicare annual wellness exam  2. Hyperlipidemia, unspecified hyperlipidemia type  Due for recheck on lipids today, screening for cardiac  - COMPREHENSIVE METABOLIC PANEL  - Lipid panel reflex to direct LDL Fasting    3. Major depressive disorder, recurrent episode, moderate (H)  We discussed her mood today and she has no new complaints and has been doing well on the Lexapro. She has been raising her grandson which adds some stress at home. She feels she is managing the stress at home well and her daughter comes once a month to help her with other tasks around the house for her.    4. Morbid obesity (H)  Discussed diet and exercise today and to include more fruits and vegetables in her diet. She is somewhat active at home with her grandson. I did recommend increasing her exercise if possible with some daily walks.    5. Shortness of breath  Peripheral edema  Cyanosis  Other specified symptoms and signs involving the circulatory and respiratory systems   She did report increase in her shortness of breath although this is improved since she  "stopped smoking she is noticing it more at rest. She is also noting an increase in her peripheral edema. Question if this is from her atrial fibrillation, cardiac remodeling or from early heart failure. Ordered a CMP and BNP to check liver, kidney and if heart failure is present. She has a follow up in May with her cardiologist. She has not had a echo since 2007 will reorder today to check for cardiac remodeling. With her cyanosis in her feet and increased swelling question if there is PAD, however no pain or early fatigue in legs and she has normal pulses bilaterally. She is at higher risk with her history of smoking and atrial fibrillation. Ordered an ADILSON to check arterial function.   - Echocardiogram Complete; Future  - BNP-N terminal pro  - US ADILSON Doppler with Exercise Bilateral; Future    6. A fib:  Follows with Cardiology regularly     7. RENU:  - recommended she follow-up with Dr. Solorzano for her hearing as well as to discuss CPAP machine.     Patient has been advised of split billing requirements and indicates understanding: Yes  COUNSELING:  Reviewed preventive health counseling, as reflected in patient instructions    Estimated body mass index is 41 kg/m  as calculated from the following:    Height as of this encounter: 1.635 m (5' 4.37\").    Weight as of this encounter: 109.6 kg (241 lb 9.6 oz).    Weight management plan: Discussed healthy diet and exercise guidelines    She reports that she quit smoking about 3 months ago. Her smoking use included cigarettes. She has a 8.00 pack-year smoking history. She has never used smokeless tobacco.    Appropriate preventive services were discussed with this patient, including applicable screening as appropriate for cardiovascular disease, diabetes, osteopenia/osteoporosis, and glaucoma.  As appropriate for age/gender, discussed screening for colorectal cancer, prostate cancer, breast cancer, and cervical cancer. Checklist reviewing preventive services available " has been given to the patient.    Reviewed patients plan of care and provided an AVS. The Intermediate Care Plan ( asthma action plan, low back pain action plan, and migraine action plan) for Tiesha meets the Care Plan requirement. This Care Plan has been established and reviewed with the Patient.    Counseling Resources:  ATP IV Guidelines  Pooled Cohorts Equation Calculator  Breast Cancer Risk Calculator  Breast Cancer: Medication to Reduce Risk  FRAX Risk Assessment  ICSI Preventive Guidelines  Dietary Guidelines for Americans, 2010  USDA's MyPlate  ASA Prophylaxis  Lung CA Screening    Jossy ALBARRAN-S2 and MEERA PriceC  [facility]    Identified Health Risks:

## 2021-04-06 NOTE — TELEPHONE ENCOUNTER
4/6/21 msg recd from refill line  Receivd a refill request from [facility] for flecainide 150 mg every 12 hours   LOV 6/18/20 with Gabi Nur. Was to follow up 1/2021   Please refill as appropriate   Attempted to call pt, reached VM, LM and requested callback.  David 350 pm

## 2021-04-06 NOTE — PROGRESS NOTES
SUBJECTIVE:   CC: Tiesha Gurrola is an 70 year old woman who presents for preventive health visit.     {Split Bill scripting  The purpose of this visit is to discuss your medical history and prevent health problems before you are sick. You may be responsible for a co-pay, coinsurance, or deductible if your visit today includes services such as checking on a sore throat, having an x-ray or lab test, or treating and evaluating a new or existing condition :685902}  Patient has been advised of split billing requirements and indicates understanding: {Yes and No:024470}  HPI  {Add if <65 person on Medicare  - Required Questions (Optional):452858}  {Outside tests to abstract? :894414}    {additional problems to add (Optional):407308}    Today's PHQ-2 Score:   PHQ-2 (  [employer]) 2018   Q1: Little interest or pleasure in doing things 1   Q2: Feeling down, depressed or hopeless 0   PHQ-2 Score 1   Q1: Little interest or pleasure in doing things Several days   Q2: Feeling down, depressed or hopeless Not at all   PHQ-2 Score 1       Abuse: Current or Past (Physical, Sexual or Emotional) - { :154907}  Do you feel safe in your environment? { :666384}        Social History     Tobacco Use     Smoking status: Current Every Day Smoker     Packs/day: 0.25     Years: 32.00     Pack years: 8.00     Types: Cigarettes     Last attempt to quit: 2018     Years since quittin.6     Smokeless tobacco: Never Used     Tobacco comment: 7 cigs a day   Substance Use Topics     Alcohol use: No     {Rooming Staff- Complete this question if Prescreen response is not shown below for today's visit. If you drink alcohol do you typically have >3 drinks per day or >7 drinks per week? (Optional):052788}    Alcohol Use 2018   Prescreen: >3 drinks/day or >7 drinks/week? Not Applicable   Prescreen: >3 drinks/day or >7 drinks/week? -   {add AUDIT responses (Optional) (A score of 7 for adult men is an indication of hazardous drinking;  "a score of 8 or more is an indication of an alcohol use disorder.  A score of 7 or more for adult women is an indication of hazardous drinking or an alchohol use disorder):179106}    Reviewed orders with patient.  Reviewed health maintenance and updated orders accordingly - { :590388::\"Yes\"}  {Chronicprobdata (optional):480423}    Breast Cancer Screening:  Any new diagnosis of family breast, ovarian, or bowel cancer? {Yes_Link to Screening / No:323550}    FSH-7: No flowsheet data found.  {If any of the questions to the BCRA (FHS-7) are answered yes, consider ordering referral for genetic counseling (Optional) :240910::\"click delete button to remove this line now\"}  {AMB Mammogram Decision Support (Optional) :547506}  Pertinent mammograms are reviewed under the imaging tab.    History of abnormal Pap smear: { :026627}  PAP / HPV Latest Ref Rng & Units 1/12/2016 9/25/2012 7/14/2009   PAP - NIL NIL NIL   HPV 16 DNA NEG Negative - -   HPV 18 DNA NEG Negative - -   OTHER HR HPV NEG Negative - -     Reviewed and updated as needed this visit by clinical staff                 Reviewed and updated as needed this visit by Provider                {HISTORY OPTIONS (Optional):556083}    Review of Systems  {FEMALE ROS (Optional):727763}     OBJECTIVE:   LMP 05/01/2009   Physical Exam  {Exam Choices (Optional):392627}    {Diagnostic Test Results (Optional):273791::\"Diagnostic Test Results:\",\"Labs reviewed in Epic\"}    ASSESSMENT/PLAN:   {Diag Picklist:790734}    Patient has been advised of split billing requirements and indicates understanding: {YES / NO:309031::\"Yes\"}  COUNSELING:  {FEMALE COUNSELING MESSAGES:709495::\"Reviewed preventive health counseling, as reflected in patient instructions\"}    Estimated body mass index is 36.56 kg/m  as calculated from the following:    Height as of 5/8/19: 1.626 m (5' 4\").    Weight as of 8/23/19: 96.6 kg (213 lb).    {Weight Management Plan (ACO) Complete if BMI is abnormal-  Ages 18-64  " BMI >24.9.  Age 65+ with BMI <23 or >30 (Optional):662872}    She reports that she has been smoking cigarettes. She has a 8.00 pack-year smoking history. She has never used smokeless tobacco.  Tobacco Cessation Action Plan:   {TOBACCO CESSATION ACTION PLAN:106903}      Counseling Resources:  ATP IV Guidelines  Pooled Cohorts Equation Calculator  Breast Cancer Risk Calculator  BRCA-Related Cancer Risk Assessment: FHS-7 Tool  FRAX Risk Assessment  ICSI Preventive Guidelines  Dietary Guidelines for Americans, 2010  USDA's MyPlate  ASA Prophylaxis  Lung CA Screening    EFRAÍN Price [facility]

## 2021-04-07 RX ORDER — FLECAINIDE ACETATE 150 MG/1
150 TABLET ORAL EVERY 12 HOURS
Qty: 60 TABLET | Refills: 0 | Status: SHIPPED | OUTPATIENT
Start: 2021-04-07 | End: 2021-05-10

## 2021-04-09 ENCOUNTER — OFFICE VISIT (OUTPATIENT)
Dept: FAMILY MEDICINE | Facility: OTHER | Age: 71
End: 2021-04-09
Payer: MEDICARE

## 2021-04-09 VITALS
WEIGHT: 241.6 LBS | SYSTOLIC BLOOD PRESSURE: 124 MMHG | TEMPERATURE: 97.4 F | HEART RATE: 62 BPM | RESPIRATION RATE: 16 BRPM | DIASTOLIC BLOOD PRESSURE: 88 MMHG | BODY MASS INDEX: 41.25 KG/M2 | HEIGHT: 64 IN

## 2021-04-09 DIAGNOSIS — E78.5 HYPERLIPIDEMIA, UNSPECIFIED HYPERLIPIDEMIA TYPE: ICD-10-CM

## 2021-04-09 DIAGNOSIS — R23.0 CYANOSIS: ICD-10-CM

## 2021-04-09 DIAGNOSIS — F33.1 MAJOR DEPRESSIVE DISORDER, RECURRENT EPISODE, MODERATE (H): ICD-10-CM

## 2021-04-09 DIAGNOSIS — E66.01 MORBID OBESITY (H): ICD-10-CM

## 2021-04-09 DIAGNOSIS — I48.0 PAROXYSMAL ATRIAL FIBRILLATION (H): ICD-10-CM

## 2021-04-09 DIAGNOSIS — Z12.31 VISIT FOR SCREENING MAMMOGRAM: ICD-10-CM

## 2021-04-09 DIAGNOSIS — G47.33 OBSTRUCTIVE SLEEP APNEA SYNDROME: ICD-10-CM

## 2021-04-09 DIAGNOSIS — R60.0 PERIPHERAL EDEMA: ICD-10-CM

## 2021-04-09 DIAGNOSIS — R09.89 OTHER SPECIFIED SYMPTOMS AND SIGNS INVOLVING THE CIRCULATORY AND RESPIRATORY SYSTEMS: ICD-10-CM

## 2021-04-09 DIAGNOSIS — R06.02 SHORTNESS OF BREATH: ICD-10-CM

## 2021-04-09 DIAGNOSIS — Z00.00 ENCOUNTER FOR MEDICARE ANNUAL WELLNESS EXAM: Primary | ICD-10-CM

## 2021-04-09 LAB
ALBUMIN SERPL-MCNC: 3.8 G/DL (ref 3.4–5)
ALP SERPL-CCNC: 56 U/L (ref 40–150)
ALT SERPL W P-5'-P-CCNC: 26 U/L (ref 0–50)
ANION GAP SERPL CALCULATED.3IONS-SCNC: 4 MMOL/L (ref 3–14)
AST SERPL W P-5'-P-CCNC: 11 U/L (ref 0–45)
BILIRUB SERPL-MCNC: 0.4 MG/DL (ref 0.2–1.3)
BUN SERPL-MCNC: 17 MG/DL (ref 7–30)
CALCIUM SERPL-MCNC: 8.6 MG/DL (ref 8.5–10.1)
CHLORIDE SERPL-SCNC: 107 MMOL/L (ref 94–109)
CHOLEST SERPL-MCNC: 144 MG/DL
CO2 SERPL-SCNC: 28 MMOL/L (ref 20–32)
CREAT SERPL-MCNC: 0.82 MG/DL (ref 0.52–1.04)
GFR SERPL CREATININE-BSD FRML MDRD: 73 ML/MIN/{1.73_M2}
GLUCOSE SERPL-MCNC: 89 MG/DL (ref 70–99)
HDLC SERPL-MCNC: 43 MG/DL
LDLC SERPL CALC-MCNC: 74 MG/DL
NONHDLC SERPL-MCNC: 101 MG/DL
NT-PROBNP SERPL-MCNC: 93 PG/ML (ref 0–125)
POTASSIUM SERPL-SCNC: 4.5 MMOL/L (ref 3.4–5.3)
PROT SERPL-MCNC: 7.4 G/DL (ref 6.8–8.8)
SODIUM SERPL-SCNC: 139 MMOL/L (ref 133–144)
TRIGL SERPL-MCNC: 136 MG/DL

## 2021-04-09 PROCEDURE — G0439 PPPS, SUBSEQ VISIT: HCPCS | Performed by: PHYSICIAN ASSISTANT

## 2021-04-09 PROCEDURE — 80061 LIPID PANEL: CPT | Performed by: PHYSICIAN ASSISTANT

## 2021-04-09 PROCEDURE — 83880 ASSAY OF NATRIURETIC PEPTIDE: CPT | Performed by: PHYSICIAN ASSISTANT

## 2021-04-09 PROCEDURE — 36415 COLL VENOUS BLD VENIPUNCTURE: CPT | Performed by: PHYSICIAN ASSISTANT

## 2021-04-09 PROCEDURE — 99214 OFFICE O/P EST MOD 30 MIN: CPT | Mod: 25 | Performed by: PHYSICIAN ASSISTANT

## 2021-04-09 PROCEDURE — 80053 COMPREHEN METABOLIC PANEL: CPT | Performed by: PHYSICIAN ASSISTANT

## 2021-04-09 ASSESSMENT — ENCOUNTER SYMPTOMS
MYALGIAS: 0
ABDOMINAL PAIN: 0
DIZZINESS: 0
WHEEZING: 0
PARESTHESIAS: 0
BREAST MASS: 0
CHEST TIGHTNESS: 0
JOINT SWELLING: 0
DIFFICULTY URINATING: 0
COUGH: 0
CHILLS: 0
SHORTNESS OF BREATH: 1
CONSTIPATION: 1
BACK PAIN: 1
NECK PAIN: 0
NECK STIFFNESS: 0
FLANK PAIN: 0
FREQUENCY: 1
NUMBNESS: 0
WEAKNESS: 0
CONFUSION: 0
FEVER: 0

## 2021-04-09 ASSESSMENT — ANXIETY QUESTIONNAIRES
4. TROUBLE RELAXING: NOT AT ALL
7. FEELING AFRAID AS IF SOMETHING AWFUL MIGHT HAPPEN: NOT AT ALL
GAD7 TOTAL SCORE: 0
2. NOT BEING ABLE TO STOP OR CONTROL WORRYING: NOT AT ALL
5. BEING SO RESTLESS THAT IT IS HARD TO SIT STILL: NOT AT ALL
3. WORRYING TOO MUCH ABOUT DIFFERENT THINGS: NOT AT ALL
7. FEELING AFRAID AS IF SOMETHING AWFUL MIGHT HAPPEN: NOT AT ALL
GAD7 TOTAL SCORE: 0
GAD7 TOTAL SCORE: 0
6. BECOMING EASILY ANNOYED OR IRRITABLE: NOT AT ALL
1. FEELING NERVOUS, ANXIOUS, OR ON EDGE: NOT AT ALL

## 2021-04-09 ASSESSMENT — ACTIVITIES OF DAILY LIVING (ADL): CURRENT_FUNCTION: NO ASSISTANCE NEEDED

## 2021-04-09 ASSESSMENT — PATIENT HEALTH QUESTIONNAIRE - PHQ9
SUM OF ALL RESPONSES TO PHQ QUESTIONS 1-9: 3
SUM OF ALL RESPONSES TO PHQ QUESTIONS 1-9: 3
10. IF YOU CHECKED OFF ANY PROBLEMS, HOW DIFFICULT HAVE THESE PROBLEMS MADE IT FOR YOU TO DO YOUR WORK, TAKE CARE OF THINGS AT HOME, OR GET ALONG WITH OTHER PEOPLE: NOT DIFFICULT AT ALL

## 2021-04-09 ASSESSMENT — MIFFLIN-ST. JEOR: SCORE: 1616.76

## 2021-04-09 NOTE — LETTER
April 13, 2021      Tiesha Gurrola  [address] [facility] [address]        Dear ,    Your labs look great, cholesterol is significantly improved, your heart failure lab was within normal limits and your kidney and liver labs were normal.  Please schedule for Echo and ABIs.     Resulted Orders   COMPREHENSIVE METABOLIC PANEL   Result Value Ref Range    Sodium 139 133 - 144 mmol/L    Potassium 4.5 3.4 - 5.3 mmol/L    Chloride 107 94 - 109 mmol/L    Carbon Dioxide 28 20 - 32 mmol/L    Anion Gap 4 3 - 14 mmol/L    Glucose 89 70 - 99 mg/dL    Urea Nitrogen 17 7 - 30 mg/dL    Creatinine 0.82 0.52 - 1.04 mg/dL    GFR Estimate 73 >60 mL/min/[1.73_m2]      Comment:      Non  GFR Calc  Starting 12/18/2018, serum creatinine based estimated GFR (eGFR) will be   calculated using the Chronic Kidney Disease Epidemiology Collaboration   (CKD-EPI) equation.      GFR Estimate If Black 84 >60 mL/min/[1.73_m2]      Comment:       GFR Calc  Starting 12/18/2018, serum creatinine based estimated GFR (eGFR) will be   calculated using the Chronic Kidney Disease Epidemiology Collaboration   (CKD-EPI) equation.      Calcium 8.6 8.5 - 10.1 mg/dL    Bilirubin Total 0.4 0.2 - 1.3 mg/dL    Albumin 3.8 3.4 - 5.0 g/dL    Protein Total 7.4 6.8 - 8.8 g/dL    Alkaline Phosphatase 56 40 - 150 U/L    ALT 26 0 - 50 U/L    AST 11 0 - 45 U/L   Lipid panel reflex to direct LDL Fasting   Result Value Ref Range    Cholesterol 144 <200 mg/dL    Triglycerides 136 <150 mg/dL    HDL Cholesterol 43 (L) >49 mg/dL    LDL Cholesterol Calculated 74 <100 mg/dL      Comment:      Desirable:       <100 mg/dl    Non HDL Cholesterol 101 <130 mg/dL   BNP-N terminal pro   Result Value Ref Range    N-Terminal Pro Bnp 93 0 - 125 pg/mL      Comment:         Reference range shown and results flagged as abnormal are for the outpatient,   non acute settings. Establishing a baseline value for each individual patient   is useful  for follow-up.  Suggested inpatient cut points for confirming diagnosis of CHF in an acute   setting are:   >450 pg/mL (age 18 to less than 50)   >900 pg/mL (age 50 to less than 75)   >1800 pg/mL (75 yrs and older)  An inpatient or emergency department NT-proPBNP <300 pg/mL effectively rules   out acute CHF, with 99% negative predictive value.          If you have any questions or concerns, please call the clinic at the number listed above.       Sincerely,      Enriqueta Mcdonald PA-C

## 2021-04-09 NOTE — PATIENT INSTRUCTIONS
- Set up with Dr. Solorzano - To recheck hearing and for the CPAP.   - They will call you to set up the Echo and ADILSON - this is for the heart and the legs.     Patient Education   Personalized Prevention Plan  You are due for the preventive services outlined below.  Your care team is available to assist you in scheduling these services.  If you have already completed any of these items, please share that information with your care team to update in your medical record.  Health Maintenance Due   Topic Date Due     COVID-19 Vaccine (1) Never done     Zoster (Shingles) Vaccine (1 of 2) Never done     Comprehensive Metabolic Panel  08/23/2020     Cholesterol Lab  08/23/2020     Flu Vaccine (1) 09/01/2020     Depression Assessment  03/04/2021

## 2021-04-10 ASSESSMENT — PATIENT HEALTH QUESTIONNAIRE - PHQ9: SUM OF ALL RESPONSES TO PHQ QUESTIONS 1-9: 3

## 2021-04-10 ASSESSMENT — ANXIETY QUESTIONNAIRES: GAD7 TOTAL SCORE: 0

## 2021-04-16 ENCOUNTER — TELEPHONE (OUTPATIENT)
Dept: FAMILY MEDICINE | Facility: OTHER | Age: 71
End: 2021-04-16

## 2021-04-16 NOTE — TELEPHONE ENCOUNTER
Patient calling about orders for CT scan and US due to toes and legs turning blue. Do not see orders, please review ov on 4/9 and place orders.

## 2021-04-22 ENCOUNTER — HOSPITAL ENCOUNTER (OUTPATIENT)
Dept: ULTRASOUND IMAGING | Facility: CLINIC | Age: 71
Discharge: HOME | End: 2021-04-22
Attending: PHYSICIAN ASSISTANT | Admitting: PHYSICIAN ASSISTANT
Payer: MEDICARE

## 2021-04-22 DIAGNOSIS — R23.0 CYANOSIS: ICD-10-CM

## 2021-04-22 DIAGNOSIS — R06.02 SHORTNESS OF BREATH: ICD-10-CM

## 2021-04-22 DIAGNOSIS — R60.0 PERIPHERAL EDEMA: ICD-10-CM

## 2021-04-22 DIAGNOSIS — R09.89 OTHER SPECIFIED SYMPTOMS AND SIGNS INVOLVING THE CIRCULATORY AND RESPIRATORY SYSTEMS: ICD-10-CM

## 2021-04-22 PROCEDURE — 93924 LWR XTR VASC STDY BILAT: CPT

## 2021-04-23 ENCOUNTER — TELEPHONE (OUTPATIENT)
Dept: FAMILY MEDICINE | Facility: OTHER | Age: 71
End: 2021-04-23

## 2021-04-23 NOTE — LETTER
April 27, 2021      Tiesah Gurrola  [address] [facility] [address]        Dear ,    We are writing to inform you of your test results.    Ultrasound of legs is good with no signs of artery problems.    Resulted Orders   US ADILSON Doppler with Exercise Bilateral    Narrative    ULTRASOUND ANKLE-BRACHIAL INDEX DOPPLER WITH EXERCISE BILATERAL    4/22/2021 12:48 PM     HISTORY: Other specified symptoms and signs involving the circulatory  and respiratory systems. Shortness of breath. Peripheral edema.  Cyanosis.    COMPARISON: None.    FINDINGS:  Right ADILSON:   PT: 1.34.  DP: 1.27.    Left ADILSON:   PT: 1.31.  DP: 1.18.     Waveforms: Triphasic bilaterally.      Impression    IMPRESSION: Normal ABIs bilaterally without evidence of arterial  insufficiency.    ADILSON CRITERIA:  >1.4 NC  0.95-1.4 Normal  0.90 - 0.94 Mild  0.5 - 0.89 Moderate  0.2 - 0.49 Severe  <0.2 Critical    PINKY ARTEAGA, DO       If you have any questions or concerns, please call the clinic at the number listed above.       Sincerely,

## 2021-04-23 NOTE — TELEPHONE ENCOUNTER
----- Message from Enriqueta Mcdonald PA-C sent at 4/23/2021  5:46 AM CDT -----  Please call patient.  Her ultrasound of legs is good, no signs of arterial problems.     Enriqueta Mcdonald PA-C

## 2021-04-27 ENCOUNTER — TELEPHONE (OUTPATIENT)
Dept: FAMILY MEDICINE | Facility: OTHER | Age: 71
End: 2021-04-27

## 2021-04-27 ENCOUNTER — HOSPITAL ENCOUNTER (OUTPATIENT)
Dept: CARDIOLOGY | Facility: CLINIC | Age: 71
Discharge: HOME | End: 2021-04-27
Attending: PHYSICIAN ASSISTANT | Admitting: PHYSICIAN ASSISTANT
Payer: MEDICARE

## 2021-04-27 DIAGNOSIS — R60.0 PERIPHERAL EDEMA: ICD-10-CM

## 2021-04-27 DIAGNOSIS — R06.02 SHORTNESS OF BREATH: Primary | ICD-10-CM

## 2021-04-27 DIAGNOSIS — R06.02 SHORTNESS OF BREATH: ICD-10-CM

## 2021-04-27 PROCEDURE — 93306 TTE W/DOPPLER COMPLETE: CPT | Mod: 26 | Performed by: INTERNAL MEDICINE

## 2021-04-27 PROCEDURE — 999N000208 ECHOCARDIOGRAM COMPLETE

## 2021-04-27 PROCEDURE — 255N000002 HC RX 255 OP 636: Performed by: INTERNAL MEDICINE

## 2021-04-27 RX ADMIN — HUMAN ALBUMIN MICROSPHERES AND PERFLUTREN 4 ML: 10; .22 INJECTION, SOLUTION INTRAVENOUS at 11:34

## 2021-04-27 NOTE — TELEPHONE ENCOUNTER
----- Message from Enriqueta Mcdonald PA-C sent at 4/27/2021  1:11 PM CDT -----  Please call patient.  Her echo was normal which is good. If persistent issues with shortness of breath may want to consider pulmonary function tests.     Enriqueta Mcdonald PA-C

## 2021-04-28 NOTE — PROGRESS NOTES
[facility]IN[facility]      Assessment & Plan   Problem List Items Addressed This Visit     SOB (shortness of breath)    Relevant Orders    NM Lexiscan stress test (nuc card)    CBC with platelets    Paroxysmal atrial fibrillation (H) - Primary    Relevant Orders    EKG 12-lead complete w/read - Clinics (performed today) (Completed)      Other Visit Diagnoses     Peripheral edema        Relevant Orders    US Venous Competency Bilateral      I had the pleasure of seeing Tiesha when she came for follow up of AFib.  This 70 year old sees Dr. Vega for her history of:    1. Sx'c paroxysmal AFib nl EF - refractory to flecainide, s/p PVI, SVC isolation, and empiric ablation of CTI 10/2016.Of note, EPS report also indicates SVC isolation done but in no other subsequent notes. Recurrent palpitations (likely AFib) and back on flecainide.  Dr. Vega noted repeat ablation could be done if needed. No recurrent AFib ZioPatch 6/2020  2. CHADSVASc 2 (age, sex). On ASA only  3. RENU - not on CPAP. Was recommended for treatment.  4. Dyslipidemia - followed by PCP    I saw Tiesha virtually 6/2020 at which time she was doing well on flecainide at the current time, but noted increasing episodes of A. fib and was thinking she would likely need another ablation.  Based on her schedule, she was thinking she would get a repeat ablation done 8/2020.  Follow-up ZP actually showed no recurrent atrial fibrillation, but 16 episodes of SVT (asymptomatic) as well as PACs.  Her triggered episodes actually appeared to be SR with occasional SR/PAC.      Recently saw PCP and noted WOODSON had improved after quitting tobacco but still present with new LLE.  ABIs and echocardiogram detailed below were normal. BNP wnl.    Interval History:  Mary Carmen is overall doing well from a palpitation/AFib standpoint. We reviewed the palpitations and concerns she'd had for recurrent AFib in 6/2020 which in retrospect thinks was due to increased tobacco use and  "increased caffeine/chocolate intake.  Once she cut back on these, her AFib improved dramatically. When she wore the ZioPatch 6/2020 things had really settled down and she wasn't surprised she had no AFib.    No complaints of chest pain, pressure or tightness.      Notes increasing SOA but even sometimes at rest she'll notice it - more consistent now but severity/intensify is not getting worse. No orthopnea.     Her LE edema has worsened x 6 months. She cares for her 12 y/o grandson and really feels that she cooks good, homemade food so doesn't think she eats a lot of sodium. Doesn't use compression stockings. Notes her toes seem to \"turn purple\" at times and \"feel weird.\"  Relieved that ABIs were OK.      VITALS:  Vitals: /87   Pulse 62   Ht 1.626 m (5' 4\")   Wt 110.6 kg (243 lb 12.8 oz)   LMP 05/01/2009   BMI 41.85 kg/m      Diagnostic Testing:  EKG today on flecainide 150 mg q 12 h , which I overread, showed SR 61 bpm. 1st degree AV Block. QRS duration 120 ms. Anterior TWI/ST depression, more prominent than previous 2019  Echocardiogram 4/27/2021 -EF 60-65%. Nl LA, 4.1 cm, 23.0 ml/m2  ABIs 4/2021 nl  ZioPatch 6/29-7/13/2020 on flecainide 150 mg BID - SR 65 bpm ( bpm). 16 episodes of asx'c SVT to 118 bpm. Rare PACs. No AFib  Component      Latest Ref Rng & Units 4/9/2021   N-Terminal Pro Bnp      0 - 125 pg/mL 93     Component      Latest Ref Rng & Units 8/23/2019 4/9/2021   Cholesterol      <200 mg/dL 218 (H) 144   Triglycerides      <150 mg/dL 198 (H) 136   HDL Cholesterol      >49 mg/dL 43 (L) 43 (L)   LDL Cholesterol Calculated      <100 mg/dL 135 (H) 74   Non HDL Cholesterol      <130 mg/dL 175 (H) 101     Component      Latest Ref Rng & Units 8/23/2019 4/9/2021   Sodium      133 - 144 mmol/L 145 (H) 139   Potassium      3.4 - 5.3 mmol/L 4.2 4.5   Chloride      94 - 109 mmol/L 111 (H) 107   Carbon Dioxide      20 - 32 mmol/L 24 28   Anion Gap      3 - 14 mmol/L 10 4   Glucose      70 - 99 " mg/dL 78 89   Urea Nitrogen      7 - 30 mg/dL 17 17   Creatinine      0.52 - 1.04 mg/dL 0.72 0.82   GFR Estimate      >60 mL/min/1.73:m2 87 73   GFR Estimate If Black      >60 mL/min/1.73:m2 >90 84   Calcium      8.5 - 10.1 mg/dL 8.8 8.6     Plan:  1. Venous Competency US   2. CBC  3. Nuclear stress test    Assessment/Plan:    1. Paroxysmal AFib; nl EF    As above, thought she was having breakthrough despite flecainide, but ZP 6/2020 showed no AFib    EKG today, as above, showed SR with QRS duration 120 ms on flecainide 150 mg BID    CHADSVASc 2 (age, sex). On ASA only     PLAN:    Continue flecainide and ASA    2. LE Edema, L>R    ABIs wnl    NTpBNP wnl    PLAN:    Venous Competency studies. Will call if nl and set her up with Vascular if abnl (Pt comes from [place])    Compression stockings    3. SOA     Long smoking hx    Reviewed nl echo, NTpBNP    PLAN:    CBC     Stress Test d/t more prominent anterior wall T waves on EKG and significant risk factors    Agree with Pulm raya Nur PA-C, MSPAS      Orders Placed This Encounter   Procedures     US Venous Competency Bilateral     CBC with platelets     EKG 12-lead complete w/read - Clinics (performed today)     No orders of the defined types were placed in this encounter.    There are no discontinued medications.      Encounter Diagnoses   Name Primary?     Paroxysmal atrial fibrillation (H) Yes     Peripheral edema      SOB (shortness of breath)        CURRENT MEDICATIONS:  Current Outpatient Medications   Medication Sig Dispense Refill     cetirizine (ZYRTEC) 10 MG tablet Take 1 tablet (10 mg) by mouth daily as needed       escitalopram (LEXAPRO) 10 MG tablet Take 1 tablet (10 mg) by mouth daily 90 tablet 3     flecainide (TAMBOCOR) 150 MG tablet Take 1 tablet (150 mg) by mouth every 12 hours 60 tablet 0     simvastatin (ZOCOR) 10 MG tablet Take 1 tablet (10 mg) by mouth daily 90 tablet 3     triamcinolone (KENALOG) 0.1 % cream Apply twice daily  "as needed 80 g 0     albuterol (VENTOLIN HFA) 108 (90 BASE) MCG/ACT Inhaler Inhale 2 puffs into the lungs every 4 hours as needed for shortness of breath / dyspnea or wheezing (Patient not taking: Reported on 5/3/2021) 1 Inhaler 1     aspirin (ASA) 325 MG EC tablet Take 1 tablet (325 mg) by mouth daily         ALLERGIES     Allergies   Allergen Reactions     Oxycodone Nausea and Vomiting         Review of Systems:  Skin:  Negative     Eyes:  Positive for glasses  ENT:  Positive for hearing loss  Respiratory:  Positive for dyspnea on exertion  Cardiovascular:  Negative for;palpitations;chest pain;edema;lightheadedness;dizziness Positive for;palpitations;lightheadedness;edema  Gastroenterology: Negative for melena;hematochezia  Genitourinary:  Negative    Musculoskeletal:  Positive for joint pain;arthritis  Neurologic:  Positive for headaches  Psychiatric:  Negative    Heme/Lymph/Imm:  Positive for allergies  Endocrine:  Negative      Physical Exam:  Vitals: /87   Pulse 62   Ht 1.626 m (5' 4\")   Wt 110.6 kg (243 lb 12.8 oz)   LMP 05/01/2009   BMI 41.85 kg/m      Constitutional:  cooperative, alert and oriented, well developed, well nourished, in no acute distress obese      Skin:  warm and dry to the touch, no apparent skin lesions or masses noted        Head:  normocephalic, no masses or lesions        Eyes:           ENT:  not assessed this visit        Neck:  JVP normal;no carotid bruit        Chest:  normal breath sounds, clear to auscultation, normal A-P diameter, normal symmetry, normal respiratory excursion, no use of accessory muscles        Cardiac: regular rhythm, normal S1/S2, no S3 or S4, apical impulse not displaced, no murmurs, gallops or rubs                  Abdomen:  abdomen soft obese      Vascular: pulses full and equal                                      Extremities and Back:  no deformities, clubbing, cyanosis, erythema observed        Neurological:  no gross motor deficits        "     PAST MEDICAL HISTORY:  Past Medical History:   Diagnosis Date     Adhesive capsulitis      Adhesive capsulitis of shoulder 7/30/2013     History of blood transfusion      Hyperlipemia      Mitral valve disorder      Mitral valve disorders(424.0)     Hx of mitral valve prolapse     OA (osteoarthritis) of hip      Osteoarthritis of acromioclavicular joint 10/17/2012     Paroxysmal atrial fibrillation (H) 7/2008     Paroxysmal supraventricular tachycardia (H)      Rotator cuff tear 10/17/2012     Tobacco abuse        PAST SURGICAL HISTORY:  Past Surgical History:   Procedure Laterality Date     C LAP,UTERUS,UNLISTED PROCEDURE  08/27/2007    Lyis of adhesions of the uterus to the abdominal wall.     C TREAT ECTOPIC PREG,ABD PREG  1974    about 3 mos.     CANALPLASTY Right 6/23/2016    Procedure: CANALPLASTY;  Surgeon: Rishabh Boudreaux MD;  Location: [facility]     COLONOSCOPY  07/11/07     CYSTOSCOPY N/A 8/20/2018    Procedure: CYSTOSCOPY;  cystoscopy, mid uretheral sling;  Surgeon: Kamari Sexton MD;  Location: [facility]     DILATION AND CURETTAGE  2/18/16    HD&C     DILATION AND CURETTAGE, OPERATIVE HYSTEROSCOPY, COMBINED N/A 2/18/2016    Procedure: COMBINED DILATION AND CURETTAGE, OPERATIVE HYSTEROSCOPY;  Surgeon: Keshia Chang DO;  Location: [facility] OR     GRAFT THIERSCH STATUS POST TYMPANOMASTOIDECTOMY Right 6/30/2016    Procedure: GRAFT THIERSCH STATUS POST TYMPANOMASTOIDECTOMY;  Surgeon: Rishabh Boudreaux MD;  Location: [facility] OR     H ABLATION FOCAL AFIB  10/12/16     HC LAPAROSCOPY, SURGICAL; APPENDECTOMY  08/27/2007     JOINT REPLACEMENT, HIP RT/LT Right 8/12/14    Joint Replacement Hip RT/LT     MEATOPLASTY EAR Right 6/23/2016    Procedure: MEATOPLASTY EAR;  Surgeon: Rishabh Boudreaux MD;  [facility]: [facility]     MYRINGOTOMY Right 4/26/2016    Procedure: MYRINGOTOMY;  Surgeon: Jake Solorzano MD;  Location: [facility]     SHOULDER SURGERY Left 1/7/15    torn bicep, arthroplasty, rotator cuff     SLING  TRANSVAGINAL N/A 8/20/2018    Procedure: SLING TRANSVAGINAL;;  Surgeon: Kamari Sexton MD;  [facility]     TYMPANOMASTOIDECTOMY Right 6/23/2016    Procedure: TYMPANOMASTOIDECTOMY;  Surgeon: Rishabh Boudreaux MD;  [facility]     TYMPANOMASTOIDECTOMY WITH FACIAL MONITORING  12/13/2011    Procedure:TYMPANOMASTOIDECTOMY WITH FACIAL MONITORING; right tympanoplasty, mastoidectomy with facial nerve monitoring; Surgeon:EVI LLAMAS; Location:[facility] OR       FAMILY HISTORY:  Family History   Problem Relation Age of Onset     Allergies Son         Hayfever     Heart Disease Son         Paroxysmal tach.     Arthritis Mother      Depression Mother         depression and anxiety     Respiratory Mother         Asthma     Arthritis Father      Heart Disease Father      Lipids Father      Arrhythmia Father      Pacemaker Father      Heart Surgery Father         bypass x3     Cancer Maternal Grandmother         Breast CA     Cancer Paternal Grandmother         ?? pancreatic CA     Osteoporosis Paternal Grandmother      Neurologic Disorder Daughter         ?? epilepsy     Obesity Daughter      Family History Negative Brother      Family History Negative Son      Family History Negative Brother      Diabetes Other         Maternal cousin --- [email or web address]     EYE* Other         Niece-- lazy eye     Heart Disease Paternal Uncle         death at 56/bypass surgery     Heart Disease Paternal Aunt        SOCIAL HISTORY:  Social History     Socioeconomic History     Marital status:      Spouse name: None     Number of children: 3     Years of education: None     Highest education level: None   Occupational History     Comment:    Social Needs     Financial resource strain: None     Food insecurity     Worry: None     Inability: None     Transportation needs     Medical: None     Non-medical: None   Tobacco Use     Smoking status: Former Smoker     Packs/day: 0.25     Years: 32.00     Pack years: 8.00      Types: Cigarettes     Quit date: 2021     Years since quittin.3     Smokeless tobacco: Never Used     Tobacco comment: 1 cigarette every two weeks   Substance and Sexual Activity     Alcohol use: No     Drug use: No     Sexual activity: Yes     Partners: Male     Comment: no b/c   Lifestyle     Physical activity     Days per week: None     Minutes per session: None     Stress: None   Relationships     Social connections     Talks on phone: None     Gets together: None     Attends Religion service: None     Active member of club or organization: None     Attends meetings of clubs or organizations: None     Relationship status: None     Intimate partner violence     Fear of current or ex partner: None     Emotionally abused: None     Physically abused: None     Forced sexual activity: None   Other Topics Concern     Parent/sibling w/ CABG, MI or angioplasty before 65F 55M? No      Service Not Asked     Blood Transfusions Not Asked     Caffeine Concern No     Occupational Exposure No     Hobby Hazards Not Asked     Sleep Concern No     Stress Concern No     Weight Concern No     Special Diet Not Asked     Back Care Not Asked     Exercise No     Bike Helmet Not Asked     Seat Belt Yes     Self-Exams Not Asked   Social History Narrative     None

## 2021-04-28 NOTE — TELEPHONE ENCOUNTER
Lm informing patient that order has been placed and that someone will call her to schedule otherwise I gave her the number to schedule as well

## 2021-05-03 ENCOUNTER — OFFICE VISIT (OUTPATIENT)
Dept: CARDIOLOGY | Facility: CLINIC | Age: 71
End: 2021-05-03
Attending: PHYSICIAN ASSISTANT
Payer: MEDICARE

## 2021-05-03 VITALS
DIASTOLIC BLOOD PRESSURE: 87 MMHG | BODY MASS INDEX: 41.62 KG/M2 | WEIGHT: 243.8 LBS | SYSTOLIC BLOOD PRESSURE: 139 MMHG | HEIGHT: 64 IN | HEART RATE: 62 BPM

## 2021-05-03 DIAGNOSIS — R06.02 SOB (SHORTNESS OF BREATH): ICD-10-CM

## 2021-05-03 DIAGNOSIS — I48.0 PAROXYSMAL ATRIAL FIBRILLATION (H): Primary | ICD-10-CM

## 2021-05-03 DIAGNOSIS — R60.0 PERIPHERAL EDEMA: ICD-10-CM

## 2021-05-03 PROCEDURE — 93000 ELECTROCARDIOGRAM COMPLETE: CPT | Performed by: PHYSICIAN ASSISTANT

## 2021-05-03 PROCEDURE — 99214 OFFICE O/P EST MOD 30 MIN: CPT | Performed by: PHYSICIAN ASSISTANT

## 2021-05-03 ASSESSMENT — MIFFLIN-ST. JEOR: SCORE: 1620.87

## 2021-05-03 NOTE — PATIENT INSTRUCTIONS
Mary Carmen - it was nice to see you today!    1. Reviewed that the palpitations calmed down after you cut back on the smoking and chocolate  2. Reviewed that the echo looked great - good pumping function and valves.    3. Reviewed the ABIs that showed no blocked arteries  4. Reviewed blood work showing NO evidence of fluid overload.    PLAN:  1. Will get a venous competency studies -  We'll call with results  2. Get one more blood test to make sure you're not losing blood  3. Get lung/breathing testing as your main doctor's office rec'd  4. Wear compression stockings to help with the swelling  5. Quit smoking.   6. Stress test.    [phone]

## 2021-05-03 NOTE — LETTER
5/3/2021    Jessy David MD  [address]  [facility] [address]    RE: Tiesha Gurrola       Dear Colleague,    I had the pleasure of seeing Tiesha Gurrola in the [facility].    [facility]      Assessment & Plan   Problem List Items Addressed This Visit     SOB (shortness of breath)    Relevant Orders    NM Lexiscan stress test (nuc card)    CBC with platelets    Paroxysmal atrial fibrillation (H) - Primary    Relevant Orders    EKG 12-lead complete w/read - Clinics (performed today) (Completed)      Other Visit Diagnoses     Peripheral edema        Relevant Orders    US Venous Competency Bilateral      I had the pleasure of seeing Tiesha when she came for follow up of AFib.  This 70 year old sees Dr. Vega for her history of:    1. Sx'c paroxysmal AFib nl EF - refractory to flecainide, s/p PVI, SVC isolation, and empiric ablation of CTI 10/2016.Of note, EPS report also indicates SVC isolation done but in no other subsequent notes. Recurrent palpitations (likely AFib) and back on flecainide.  Dr. Vega noted repeat ablation could be done if needed. No recurrent AFib ZioPatch 6/2020  2. CHADSVASc 2 (age, sex). On ASA only  3. RENU - not on CPAP. Was recommended for treatment.  4. Dyslipidemia - followed by PCP    I saw Tiesha virtually 6/2020 at which time she was doing well on flecainide at the current time, but noted increasing episodes of A. fib and was thinking she would likely need another ablation.  Based on her schedule, she was thinking she would get a repeat ablation done 8/2020.  Follow-up ZP actually showed no recurrent atrial fibrillation, but 16 episodes of SVT (asymptomatic) as well as PACs.  Her triggered episodes actually appeared to be SR with occasional SR/PAC.      Recently saw PCP and noted WOODSON had improved after quitting tobacco but still present with new LLE.  ABIs and echocardiogram detailed  "below were normal. BNP wnl.    Interval History:  Mary Carmen is overall doing well from a palpitation/AFib standpoint. We reviewed the palpitations and concerns she'd had for recurrent AFib in 6/2020 which in retrospect thinks was due to increased tobacco use and increased caffeine/chocolate intake.  Once she cut back on these, her AFib improved dramatically. When she wore the ZioPatch 6/2020 things had really settled down and she wasn't surprised she had no AFib.    No complaints of chest pain, pressure or tightness.      Notes increasing SOA but even sometimes at rest she'll notice it - more consistent now but severity/intensify is not getting worse. No orthopnea.     Her LE edema has worsened x 6 months. She cares for her 14 y/o grandson and really feels that she cooks good, homemade food so doesn't think she eats a lot of sodium. Doesn't use compression stockings. Notes her toes seem to \"turn purple\" at times and \"feel weird.\"  Relieved that ABIs were OK.      VITALS:  Vitals: /87   Pulse 62   Ht 1.626 m (5' 4\")   Wt 110.6 kg (243 lb 12.8 oz)   LMP 05/01/2009   BMI 41.85 kg/m      Diagnostic Testing:  EKG today on flecainide 150 mg q 12 h , which I overread, showed SR 61 bpm. 1st degree AV Block. QRS duration 120 ms. Anterior TWI/ST depression, more prominent than previous 2019  Echocardiogram 4/27/2021 -EF 60-65%. Nl LA, 4.1 cm, 23.0 ml/m2  ABIs 4/2021 nl  ZioPatch 6/29-7/13/2020 on flecainide 150 mg BID - SR 65 bpm ( bpm). 16 episodes of asx'c SVT to 118 bpm. Rare PACs. No AFib  Component      Latest Ref Rng & Units 4/9/2021   N-Terminal Pro Bnp      0 - 125 pg/mL 93     Component      Latest Ref Rng & Units 8/23/2019 4/9/2021   Cholesterol      <200 mg/dL 218 (H) 144   Triglycerides      <150 mg/dL 198 (H) 136   HDL Cholesterol      >49 mg/dL 43 (L) 43 (L)   LDL Cholesterol Calculated      <100 mg/dL 135 (H) 74   Non HDL Cholesterol      <130 mg/dL 175 (H) 101     Component      Latest Ref Rng & " Units 8/23/2019 4/9/2021   Sodium      133 - 144 mmol/L 145 (H) 139   Potassium      3.4 - 5.3 mmol/L 4.2 4.5   Chloride      94 - 109 mmol/L 111 (H) 107   Carbon Dioxide      20 - 32 mmol/L 24 28   Anion Gap      3 - 14 mmol/L 10 4   Glucose      70 - 99 mg/dL 78 89   Urea Nitrogen      7 - 30 mg/dL 17 17   Creatinine      0.52 - 1.04 mg/dL 0.72 0.82   GFR Estimate      >60 mL/min/1.73:m2 87 73   GFR Estimate If Black      >60 mL/min/1.73:m2 >90 84   Calcium      8.5 - 10.1 mg/dL 8.8 8.6     Plan:  1. Venous Competency US   2. CBC  3. Nuclear stress test    Assessment/Plan:    1. Paroxysmal AFib; nl EF    As above, thought she was having breakthrough despite flecainide, but ZP 6/2020 showed no AFib    EKG today, as above, showed SR with QRS duration 120 ms on flecainide 150 mg BID    CHADSVASc 2 (age, sex). On ASA only     PLAN:    Continue flecainide and ASA    2. LE Edema, L>R    ABIs wnl    NTpBNP wnl    PLAN:    Venous Competency studies. Will call if nl and set her up with Vascular if abnl (Pt comes from [facility])    Compression stockings    3. SOA     Long smoking hx    Reviewed nl echo, NTpBNP    PLAN:    CBC     Stress Test d/t more prominent anterior wall T waves on EKG and significant risk factors    Agree with Pulm eval        Gabi Nur PA-C, MSPAS      Orders Placed This Encounter   Procedures     US Venous Competency Bilateral     CBC with platelets     EKG 12-lead complete w/read - Clinics (performed today)     No orders of the defined types were placed in this encounter.    There are no discontinued medications.      Encounter Diagnoses   Name Primary?     Paroxysmal atrial fibrillation (H) Yes     Peripheral edema      SOB (shortness of breath)        CURRENT MEDICATIONS:  Current Outpatient Medications   Medication Sig Dispense Refill     cetirizine (ZYRTEC) 10 MG tablet Take 1 tablet (10 mg) by mouth daily as needed       escitalopram (LEXAPRO) 10 MG tablet Take 1 tablet (10 mg) by mouth daily  "90 tablet 3     flecainide (TAMBOCOR) 150 MG tablet Take 1 tablet (150 mg) by mouth every 12 hours 60 tablet 0     simvastatin (ZOCOR) 10 MG tablet Take 1 tablet (10 mg) by mouth daily 90 tablet 3     triamcinolone (KENALOG) 0.1 % cream Apply twice daily as needed 80 g 0     albuterol (VENTOLIN HFA) 108 (90 BASE) MCG/ACT Inhaler Inhale 2 puffs into the lungs every 4 hours as needed for shortness of breath / dyspnea or wheezing (Patient not taking: Reported on 5/3/2021) 1 Inhaler 1     aspirin (ASA) 325 MG EC tablet Take 1 tablet (325 mg) by mouth daily         ALLERGIES     Allergies   Allergen Reactions     Oxycodone Nausea and Vomiting         Review of Systems:  Skin:  Negative     Eyes:  Positive for glasses  ENT:  Positive for hearing loss  Respiratory:  Positive for dyspnea on exertion  Cardiovascular:  Negative for;palpitations;chest pain;edema;lightheadedness;dizziness Positive for;palpitations;lightheadedness;edema  Gastroenterology: Negative for melena;hematochezia  Genitourinary:  Negative    Musculoskeletal:  Positive for joint pain;arthritis  Neurologic:  Positive for headaches  Psychiatric:  Negative    Heme/Lymph/Imm:  Positive for allergies  Endocrine:  Negative      Physical Exam:  Vitals: /87   Pulse 62   Ht 1.626 m (5' 4\")   Wt 110.6 kg (243 lb 12.8 oz)   LMP 05/01/2009   BMI 41.85 kg/m      Constitutional:  cooperative, alert and oriented, well developed, well nourished, in no acute distress obese      Skin:  warm and dry to the touch, no apparent skin lesions or masses noted        Head:  normocephalic, no masses or lesions        Eyes:           ENT:  not assessed this visit        Neck:  JVP normal;no carotid bruit        Chest:  normal breath sounds, clear to auscultation, normal A-P diameter, normal symmetry, normal respiratory excursion, no use of accessory muscles        Cardiac: regular rhythm, normal S1/S2, no S3 or S4, apical impulse not displaced, no murmurs, gallops or rubs "                  Abdomen:  abdomen soft obese      Vascular: pulses full and equal                                      Extremities and Back:  no deformities, clubbing, cyanosis, erythema observed        Neurological:  no gross motor deficits            PAST MEDICAL HISTORY:  Past Medical History:   Diagnosis Date     Adhesive capsulitis      Adhesive capsulitis of shoulder 7/30/2013     History of blood transfusion      Hyperlipemia      Mitral valve disorder      Mitral valve disorders(424.0)     Hx of mitral valve prolapse     OA (osteoarthritis) of hip      Osteoarthritis of acromioclavicular joint 10/17/2012     Paroxysmal atrial fibrillation (H) 7/2008     Paroxysmal supraventricular tachycardia (H)      Rotator cuff tear 10/17/2012     Tobacco abuse        PAST SURGICAL HISTORY:  Past Surgical History:   Procedure Laterality Date     C LAP,UTERUS,UNLISTED PROCEDURE  08/27/2007    Lyis of adhesions of the uterus to the abdominal wall.     C TREAT ECTOPIC PREG,ABD PREG  1974    about 3 mos.     CANALPLASTY Right 6/23/2016    Procedure: CANALPLASTY;  Surgeon: Rishabh Boudreaux MD;  Location: [facility]     COLONOSCOPY  07/11/07     CYSTOSCOPY N/A 8/20/2018    Procedure: CYSTOSCOPY;  cystoscopy, mid uretheral sling;  Surgeon: Kamari Sexton MD;  Location: [facility]     DILATION AND CURETTAGE  2/18/16    HD&C     DILATION AND CURETTAGE, OPERATIVE HYSTEROSCOPY, COMBINED N/A 2/18/2016    Procedure: COMBINED DILATION AND CURETTAGE, OPERATIVE HYSTEROSCOPY;  Surgeon: Keshia Chang DO;  Location: [facility] OR     GRAFT THIERSCH STATUS POST TYMPANOMASTOIDECTOMY Right 6/30/2016    Procedure: GRAFT THIERSCH STATUS POST TYMPANOMASTOIDECTOMY;  Surgeon: Rishabh Boudreaux MD;  Location: [facility] OR     H ABLATION FOCAL AFIB  10/12/16     HC LAPAROSCOPY, SURGICAL; APPENDECTOMY  08/27/2007     JOINT REPLACEMENT, HIP RT/LT Right 8/12/14    Joint Replacement Hip RT/LT     MEATOPLASTY EAR Right 6/23/2016    Procedure: MEATOPLASTY  EAR;  Surgeon: Rishabh Boudreaux MD;  [facility]: [facility]     MYRINGOTOMY Right 4/26/2016    Procedure: MYRINGOTOMY;  Surgeon: Evi Solorzano MD;  [facility]     SHOULDER SURGERY Left 1/7/15    torn bicep, arthroplasty, rotator cuff     SLING TRANSVAGINAL N/A 8/20/2018    Procedure: SLING TRANSVAGINAL;;  Surgeon: Kamari Sexton MD;  [facility]     TYMPANOMASTOIDECTOMY Right 6/23/2016    Procedure: TYMPANOMASTOIDECTOMY;  Surgeon: Rishabh Boudreaux MD;  [facility]     TYMPANOMASTOIDECTOMY WITH FACIAL MONITORING  12/13/2011    Procedure:TYMPANOMASTOIDECTOMY WITH FACIAL MONITORING; right tympanoplasty, mastoidectomy with facial nerve monitoring; Surgeon:EVI SOLORZANO; Location:[facility] OR       FAMILY HISTORY:  Family History   Problem Relation Age of Onset     Allergies Son         Hayfever     Heart Disease Son         Paroxysmal tach.     Arthritis Mother      Depression Mother         depression and anxiety     Respiratory Mother         Asthma     Arthritis Father      Heart Disease Father      Lipids Father      Arrhythmia Father      Pacemaker Father      Heart Surgery Father         bypass x3     Cancer Maternal Grandmother         Breast CA     Cancer Paternal Grandmother         ?? pancreatic CA     Osteoporosis Paternal Grandmother      Neurologic Disorder Daughter         ?? epilepsy     Obesity Daughter      Family History Negative Brother      Family History Negative Son      Family History Negative Brother      Diabetes Other         Maternal cousin --- juev.onset     EYE* Other         Niece-- lazy eye     Heart Disease Paternal Uncle         death at 56/bypass surgery     Heart Disease Paternal Aunt        SOCIAL HISTORY:  Social History     Socioeconomic History     Marital status:      Spouse name: None     Number of children: 3     Years of education: None     Highest education level: None   Occupational History     Comment:    Social Needs     Financial  resource strain: None     Food insecurity     Worry: None     Inability: None     Transportation needs     Medical: None     Non-medical: None   Tobacco Use     Smoking status: Former Smoker     Packs/day: 0.25     Years: 32.00     Pack years: 8.00     Types: Cigarettes     Quit date: 2021     Years since quittin.3     Smokeless tobacco: Never Used     Tobacco comment: 1 cigarette every two weeks   Substance and Sexual Activity     Alcohol use: No     Drug use: No     Sexual activity: Yes     Partners: Male     Comment: no b/c   Lifestyle     Physical activity     Days per week: None     Minutes per session: None     Stress: None   Relationships     Social connections     Talks on phone: None     Gets together: None     Attends Rastafari service: None     Active member of club or organization: None     Attends meetings of clubs or organizations: None     Relationship status: None     Intimate partner violence     Fear of current or ex partner: None     Emotionally abused: None     Physically abused: None     Forced sexual activity: None   Other Topics Concern     Parent/sibling w/ CABG, MI or angioplasty before 65F 55M? No      Service Not Asked     Blood Transfusions Not Asked     Caffeine Concern No     Occupational Exposure No     Hobby Hazards Not Asked     Sleep Concern No     Stress Concern No     Weight Concern No     Special Diet Not Asked     Back Care Not Asked     Exercise No     Bike Helmet Not Asked     Seat Belt Yes     Self-Exams Not Asked   Social History Narrative     None     Thank you for allowing me to participate in the care of your patient.      Sincerely,     Debi Nur PA-C     [facility]    cc:   Debi Nur PA-C  [address]  [place] [address]

## 2021-05-05 ENCOUNTER — DOCUMENTATION ONLY (OUTPATIENT)
Dept: CARDIOLOGY | Facility: CLINIC | Age: 71
End: 2021-05-05

## 2021-05-05 ENCOUNTER — ANCILLARY PROCEDURE (OUTPATIENT)
Dept: VASCULAR ULTRASOUND | Facility: CLINIC | Age: 71
End: 2021-05-05
Attending: PHYSICIAN ASSISTANT
Payer: MEDICARE

## 2021-05-05 DIAGNOSIS — R06.02 SOB (SHORTNESS OF BREATH): ICD-10-CM

## 2021-05-05 DIAGNOSIS — R60.0 PERIPHERAL EDEMA: ICD-10-CM

## 2021-05-05 DIAGNOSIS — R60.0 PERIPHERAL EDEMA: Primary | ICD-10-CM

## 2021-05-05 LAB
ERYTHROCYTE [DISTWIDTH] IN BLOOD BY AUTOMATED COUNT: 12.4 % (ref 10–15)
HCT VFR BLD AUTO: 40.6 % (ref 35–47)
HGB BLD-MCNC: 13.6 G/DL (ref 11.7–15.7)
MCH RBC QN AUTO: 30.8 PG (ref 26.5–33)
MCHC RBC AUTO-ENTMCNC: 33.5 G/DL (ref 31.5–36.5)
MCV RBC AUTO: 92 FL (ref 78–100)
PLATELET # BLD AUTO: 218 10E9/L (ref 150–450)
RBC # BLD AUTO: 4.41 10E12/L (ref 3.8–5.2)
WBC # BLD AUTO: 6.7 10E9/L (ref 4–11)

## 2021-05-05 PROCEDURE — 36415 COLL VENOUS BLD VENIPUNCTURE: CPT | Performed by: PHYSICIAN ASSISTANT

## 2021-05-05 PROCEDURE — 85027 COMPLETE CBC AUTOMATED: CPT | Performed by: PHYSICIAN ASSISTANT

## 2021-05-05 PROCEDURE — 93970 EXTREMITY STUDY: CPT | Performed by: INTERNAL MEDICINE

## 2021-05-05 NOTE — PROGRESS NOTES
Pls call Mary Carmen -     1. CBC done for SOB is fine.  2. Venous Studies showed severe reflux in LLE, which likely is causing her edema.  She should wear compression stockings and I think she should meet with Vascular. I've placed order. I put in for Harika b/c pt goes to both [place] and [facility] and I believe Harika goes to [place]    3. Stress test (I think going to do in [place]) for SOB - not scheduled so pls remind her to set this up. Still also needs Pulm Consult (PCP's office rec'd) for her SOB/smoking hx    CONCLUSION:  Right lower extremity veins and Limited iliac vein:  1. No DVT  2. No venous reflux.     Left lower extremity veins and limited iliac vein:  1. No DVT  2. Moderate to severe reflux of the GSV the from the proximal to mid thigh with varicose veins communicating with the more distal small saphenous vein of the posterior calf, also with severe reflux.  Component      Latest Ref Rng & Units 5/5/2021   WBC      4.0 - 11.0 10e9/L 6.7   RBC Count      3.8 - 5.2 10e12/L 4.41   Hemoglobin      11.7 - 15.7 g/dL 13.6   Hematocrit      35.0 - 47.0 % 40.6   MCV      78 - 100 fl 92   MCH      26.5 - 33.0 pg 30.8   MCHC      31.5 - 36.5 g/dL 33.5   RDW      10.0 - 15.0 % 12.4   Platelet Count      150 - 450 10e9/L 218

## 2021-05-10 DIAGNOSIS — I48.91 ATRIAL FIBRILLATION (H): ICD-10-CM

## 2021-05-10 RX ORDER — FLECAINIDE ACETATE 150 MG/1
150 TABLET ORAL EVERY 12 HOURS
Qty: 180 TABLET | Refills: 3 | Status: SHIPPED | OUTPATIENT
Start: 2021-05-10 | End: 2022-05-12

## 2021-05-17 NOTE — PROGRESS NOTES
Left second message for pt to call back with results of her venus US and recommendations from Gabi. Duncan

## 2021-05-26 ENCOUNTER — HOSPITAL ENCOUNTER (OUTPATIENT)
Dept: RESPIRATORY THERAPY | Facility: CLINIC | Age: 71
Discharge: HOME | End: 2021-05-26
Attending: FAMILY MEDICINE | Admitting: FAMILY MEDICINE
Payer: MEDICARE

## 2021-05-26 DIAGNOSIS — R06.02 SHORTNESS OF BREATH: ICD-10-CM

## 2021-05-26 PROCEDURE — 94060 EVALUATION OF WHEEZING: CPT

## 2021-05-26 PROCEDURE — 250N000009 HC RX 250

## 2021-05-26 PROCEDURE — 94729 DIFFUSING CAPACITY: CPT

## 2021-05-26 PROCEDURE — 94726 PLETHYSMOGRAPHY LUNG VOLUMES: CPT

## 2021-05-26 RX ORDER — ALBUTEROL SULFATE 0.83 MG/ML
2.5 SOLUTION RESPIRATORY (INHALATION) ONCE
Status: COMPLETED | OUTPATIENT
Start: 2021-05-26 | End: 2021-05-26

## 2021-05-26 RX ADMIN — ALBUTEROL SULFATE 2.5 MG: 2.5 SOLUTION RESPIRATORY (INHALATION) at 13:47

## 2021-05-26 NOTE — PROGRESS NOTES
Pre-Bronch    Post-Bronch       Actual Pred %Pred  Actual %Pred %Chng     ---- SPIROMETRY ----                      FVC (L) 2.17 2.91 74   2.26 77 +3        FEV1 (L) 1.61 2.27 70   1.71 75 +6        FEV1/FVC (%) 74 78     76   +2        FEV1/SVC (%) 64 73                  FEV1/FEV6 (%) 74 79     76   +1        FEF Max (L/sec) 4.94 5.79 85   6.09 105 +23        FEF 25-75% (L/sec) 1.15 1.95 59   1.49 76 +29        FIVC (L) 2.23       2.16   -3        FIF Max (L/sec) 3.49 4.64 75   2.69 58 -22        Expiratory Time (sec) 6.90       6.81   -1        ----LUNG MECHANICS                       MVV (L/min)   87                  MEP (cmH2O)                      MIP (cmH2O)                      ---- LUNG VOLUMES ----                      SVC (L) 2.51 3.10 80                IC (L) 2.24 2.94 76                ERV (L) 0.24 0.16 149                FRC(Pleth) (L) 2.38 2.71 87                RV (Pleth) (L) 2.11 2.03 103                TLC (Pleth) (L) 4.62 4.94 93                RV/TLC (Pleth) (%) 46 42                  ---- DIFFUSION ----                      DLCOunc (ml/min/mmHg) 16.65 19.61 84                DLCOcor (ml/min/mmHg) 16.54 19.61 84                DL/VA (ml/min/mmHg/L) 4.14 4.15                  VA (L) 3.99 4.72 84                IVC (L) 2.34                    Hgb (gm/dL) 13.6 12-18

## 2021-05-26 NOTE — DISCHARGE INSTRUCTIONS
Thank you for completing pulmonary function testing today.  All results will be scanned into your epic results for your doctor to review.  Please resume taking all your current prescribed medications and diet as directed by your provider.   If you have not heard from your provider about your testing within two weeks and do not have a follow-up appointment scheduled with them please contact your provider about any questions you have concerning your testing.   Thank you  [facility]

## 2021-05-27 LAB
DLCOCOR-%PRED-PRE: 84 %
DLCOCOR-PRE: 16.54 ML/MIN/MMHG
DLCOUNC-%PRED-PRE: 84 %
DLCOUNC-PRE: 16.65 ML/MIN/MMHG
DLCOUNC-PRED: 19.61 ML/MIN/MMHG
ERV-%PRED-PRE: 149 %
ERV-PRE: 0.24 L
ERV-PRED: 0.16 L
EXPTIME-PRE: 6.9 SEC
FEF2575-%PRED-POST: 76 %
FEF2575-%PRED-PRE: 59 %
FEF2575-POST: 1.49 L/SEC
FEF2575-PRE: 1.15 L/SEC
FEF2575-PRED: 1.95 L/SEC
FEFMAX-%PRED-PRE: 85 %
FEFMAX-PRE: 4.94 L/SEC
FEFMAX-PRED: 5.79 L/SEC
FEV1-%PRED-PRE: 70 %
FEV1-PRE: 1.61 L
FEV1FEV6-PRE: 74 %
FEV1FEV6-PRED: 79 %
FEV1FVC-PRE: 74 %
FEV1FVC-PRED: 78 %
FEV1SVC-PRE: 64 %
FEV1SVC-PRED: 73 %
FIFMAX-PRE: 3.49 L/SEC
FRCPLETH-%PRED-PRE: 87 %
FRCPLETH-PRE: 2.38 L
FRCPLETH-PRED: 2.71 L
FVC-%PRED-PRE: 74 %
FVC-PRE: 2.17 L
FVC-PRED: 2.91 L
GAW-%PRED-PRE: 49 %
GAW-PRE: 0.51 L/S/CMH2O
GAW-PRED: 1.03 L/S/CMH2O
IC-%PRED-PRE: 76 %
IC-PRE: 2.24 L
IC-PRED: 2.94 L
RVPLETH-%PRED-PRE: 103 %
RVPLETH-PRE: 2.11 L
RVPLETH-PRED: 2.03 L
SGAW-%PRED-PRE: 152 %
SGAW-PRE: 0.16 1/CMH2O*S
SGAW-PRED: 0.1 1/CMH2O*S
SRAW-%PRED-PRE: 135 %
SRAW-PRE: 6.44 CMH2O*S
SRAW-PRED: 4.76 CMH2O*S
TLCPLETH-%PRED-PRE: 93 %
TLCPLETH-PRE: 4.62 L
TLCPLETH-PRED: 4.94 L
VA-%PRED-PRE: 84 %
VA-PRE: 3.99 L
VC-%PRED-PRE: 80 %
VC-PRE: 2.51 L
VC-PRED: 3.1 L

## 2021-05-28 ENCOUNTER — TELEPHONE (OUTPATIENT)
Dept: FAMILY MEDICINE | Facility: OTHER | Age: 71
End: 2021-05-28

## 2021-05-28 DIAGNOSIS — R06.02 SHORTNESS OF BREATH: Primary | ICD-10-CM

## 2021-05-28 NOTE — TELEPHONE ENCOUNTER
Eelazar Robison:       I spoke with Tiesha and she would like to try an inhaler..     She uses [facility] for her pharmacy..         I gave her this message:             Her pulmonary tests showed some signs of obstruction that is mild.  But there was no improvement with the albuterol they administered.  If she is still having shortness of breath and the cardiologist didn't seem to think it was her heart then she could trial an inhaler to see if it helps her symptoms.

## 2021-05-28 NOTE — TELEPHONE ENCOUNTER
Sent inhaler to pharmacy, she should follow-up in 1 month for re-check.  Recommend rinsing mouth after she uses in the inhaler to prevent Thrush.     Enriqueta Mcdonald PA-C

## 2021-06-01 NOTE — TELEPHONE ENCOUNTER
PA Initiation    Medication: beclomethasone HFA (QVAR REDIHALER) 80 MCG/ACT inhaler  Insurance Company: [employer] - Phone [phone] Fax [phone]  Pharmacy Filling the Rx: [id number] - [place], [place] - [address] [address]  Filling Pharmacy Phone: [phone]  Filling Pharmacy Fax: [phone]  Start Date: 6/1/2021

## 2021-06-02 RX ORDER — FLUTICASONE PROPIONATE 44 UG/1
2 AEROSOL, METERED RESPIRATORY (INHALATION) 2 TIMES DAILY
Qty: 10.6 G | Refills: 0 | Status: SHIPPED | OUTPATIENT
Start: 2021-06-02 | End: 2022-03-11

## 2021-06-02 NOTE — TELEPHONE ENCOUNTER
PRIOR AUTHORIZATION DENIED    Medication: beclomethasone HFA (QVAR REDIHALER) 80 MCG/ACT inhaler    Denial Date: 6/2/2021    Denial Rationale: Patient has to first try/fail 2 preferred medications (see list below).        Appeal Information: If provider would like to appeal this decision we will need a detailed letter of medical necessity to start the process. Then re-route this request back to the PA pool.

## 2021-06-03 ENCOUNTER — TELEPHONE (OUTPATIENT)
Dept: FAMILY MEDICINE | Facility: OTHER | Age: 71
End: 2021-06-03

## 2021-06-03 NOTE — TELEPHONE ENCOUNTER
Reason for Call:  Form, our goal is to have forms completed with 72 hours, however, some forms may require a visit or additional information.    Type of letter, form or note:  medical    Who is the form from?: [facility] (if other please explain)    Where did the form come from: form was faxed in    What clinic location was the form placed at?: [facility] - [phone]    Where the form was placed: [facility] Form Bin    What number is listed as a contact on the form?: fax [phone]       Additional comments: Please complete and fax    Call taken on 6/3/2021 at 7:39 AM by Angela Mcdonald

## 2021-06-04 NOTE — TELEPHONE ENCOUNTER
I am not sure what this form is for.  It is a Medicare drug coverage request form but it does not say what medication.  I see that patient has recently seen ES for her annual exam and some shortness of breath.  This might be in regards to the inhalers that ES prescribed.  I will put this in ES basket to review.

## 2021-06-07 NOTE — PROGRESS NOTES
6/7/21 Fourth attempt to call pt.  Left detailed message for pt explaining that blood work was nl and does not show any cause for SOB. Explained reccommends from Gabi for Vascular follow up d/t swelling in LLE. Orders placed , will have scheduling call pt to schedule. Pt already has Stress Test scheduled.  Encounter closed.  David 930 am

## 2021-06-07 NOTE — TELEPHONE ENCOUNTER
Will hold onto this form but provider sent in different Rx that was approved on formulary.  Shouldn't require this form at this time.     Enriqueta Mcdonald PA-C

## 2021-07-28 ENCOUNTER — ANCILLARY PROCEDURE (OUTPATIENT)
Dept: MAMMOGRAPHY | Facility: OTHER | Age: 71
End: 2021-07-28
Payer: MEDICARE

## 2021-07-28 DIAGNOSIS — Z12.31 VISIT FOR SCREENING MAMMOGRAM: ICD-10-CM

## 2021-07-28 PROCEDURE — 77063 BREAST TOMOSYNTHESIS BI: CPT | Mod: TC | Performed by: RADIOLOGY

## 2021-07-28 PROCEDURE — 77067 SCR MAMMO BI INCL CAD: CPT | Mod: TC | Performed by: RADIOLOGY

## 2021-08-05 ENCOUNTER — TELEPHONE (OUTPATIENT)
Dept: FAMILY MEDICINE | Facility: OTHER | Age: 71
End: 2021-08-05

## 2021-08-05 NOTE — TELEPHONE ENCOUNTER
Tried reaching patient to let her know that I had contact [facility] in [place] and they will fill her lexapro. She can go  today if she wants.  When Patient called earlier she said okay to lm at home number but mail box is full. So unable to. And she said NOT to lm on her cell phone because she doesn't know how to retrieve them.    NA at home or cell phone x 3- will keep trying to let her know.

## 2021-08-06 NOTE — PROGRESS NOTES
History of Present Illness - Tiesha Gurrola is a 70 year old female presenting in clinic today for a recheck on Patient presents with:  Follow Up    Patient was seen almost 3 years ago.  She had a canal wall down tympanomastoidectomy on the right for extensive ear disease in the past.  Her has not heard her ear officially looked at that is the cavity or cleaned in or tuning of years.  She denies any discharge but the ear feels plugged and full now.  She wears hearing aid on the left side.  Left ear does not seem to bother her.        BP Readings from Last 1 Encounters:   08/11/21 120/80       BP noted to be well controlled today in office.     Tiesha IS a smoker/uses chewing tobacco.  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/2012     Paroxysmal atrial fibrillation (H) 7/2008     Paroxysmal supraventricular tachycardia (H)      Rotator cuff tear 10/17/2012     Tobacco abuse        Current Medications -   Current Outpatient Medications:      aspirin (ASA) 325 MG EC tablet, Take 1 tablet (325 mg) by mouth daily, Disp: , Rfl:      cetirizine (ZYRTEC) 10 MG tablet, Take 1 tablet (10 mg) by mouth daily as needed, Disp: , Rfl:      escitalopram (LEXAPRO) 10 MG tablet, Take 1 tablet (10 mg) by mouth daily, Disp: 90 tablet, Rfl: 3     flecainide (TAMBOCOR) 150 MG tablet, Take 1 tablet (150 mg) by mouth every 12 hours, Disp: 180 tablet, Rfl: 3     simvastatin (ZOCOR) 10 MG tablet, Take 1 tablet (10 mg) by mouth daily, Disp: 90 tablet, Rfl: 3     albuterol (VENTOLIN HFA) 108 (90 BASE) MCG/ACT Inhaler, Inhale 2 puffs into the lungs every 4 hours as needed for shortness of  breath / dyspnea or wheezing (Patient not taking: Reported on 5/3/2021), Disp: 1 Inhaler, Rfl: 1     fluticasone (FLOVENT HFA) 44 MCG/ACT inhaler, Inhale 2 puffs into the lungs 2 times daily (Patient not taking: Reported on 2021), Disp: 10.6 g, Rfl: 0     triamcinolone (KENALOG) 0.1 % cream, Apply twice daily as needed (Patient not taking: Reported on 2021), Disp: 80 g, Rfl: 0    Allergies -   Allergies   Allergen Reactions     Oxycodone Nausea and Vomiting       Social History -   Social History     Socioeconomic History     Marital status:      Spouse name: Not on file     Number of children: 3     Years of education: Not on file     Highest education level: Not on file   Occupational History     Comment: school cook   Tobacco Use     Smoking status: Former Smoker     Packs/day: 0.25     Years: 32.00     Pack years: 8.00     Types: Cigarettes     Quit date: 2021     Years since quittin.5     Smokeless tobacco: Never Used     Tobacco comment: 1 cigarette every two weeks   Substance and Sexual Activity     Alcohol use: No     Drug use: No     Sexual activity: Yes     Partners: Male     Comment: no b/c   Other Topics Concern     Parent/sibling w/ CABG, MI or angioplasty before 65F 55M? No      Service Not Asked     Blood Transfusions Not Asked     Caffeine Concern No     Occupational Exposure No     Hobby Hazards Not Asked     Sleep Concern No     Stress Concern No     Weight Concern No     Special Diet Not Asked     Back Care Not Asked     Exercise No     Bike Helmet Not Asked     Seat Belt Yes     Self-Exams Not Asked   Social History Narrative     Not on file     Social Determinants of Health     Financial Resource Strain:      Difficulty of Paying Living Expenses:    Food Insecurity:      Worried About Running Out of Food in the Last Year:      Ran Out of Food in the Last Year:    Transportation Needs:      Lack of Transportation (Medical):      Lack of Transportation  "(Non-Medical):    Physical Activity:      Days of Exercise per Week:      Minutes of Exercise per Session:    Stress:      Feeling of Stress :    Social Connections:      Frequency of Communication with Friends and Family:      Frequency of Social Gatherings with Friends and Family:      Attends Jain Services:      Active Member of Clubs or Organizations:      Attends Club or Organization Meetings:      Marital Status:    Intimate Partner Violence:      Fear of Current or Ex-Partner:      Emotionally Abused:      Physically Abused:      Sexually Abused:        Family History -   Family History   Problem Relation Age of Onset     Allergies Son         Hayfever     Heart Disease Son         Paroxysmal tach.     Arthritis Mother      Depression Mother         depression and anxiety     Respiratory Mother         Asthma     Arthritis Father      Heart Disease Father      Lipids Father      Arrhythmia Father      Pacemaker Father      Heart Surgery Father         bypass x3     Cancer Maternal Grandmother         Breast CA     Cancer Paternal Grandmother         ?? pancreatic CA     Osteoporosis Paternal Grandmother      Neurologic Disorder Daughter         ?? epilepsy     Obesity Daughter      Family History Negative Brother      Family History Negative Son      Family History Negative Brother      Diabetes Other         Maternal cousin --- [email or web address]     EYE* Other         Niece-- lazy eye     Heart Disease Paternal Uncle         death at 56/bypass surgery     Heart Disease Paternal Aunt        Review of Systems - As per HPI and PMHx, otherwise review of system review of the head and neck negative. Otherwise 10+ review of system is negative    Physical Exam  /80   Ht 1.626 m (5' 4\")   Wt 110.2 kg (243 lb)   LMP 05/01/2009   BMI 41.71 kg/m    BMI: Body mass index is 41.71 kg/m .    General - The patient is well nourished and well developed, and appears to have good nutritional status.  Alert and oriented " to person and place, answers questions and cooperates with examination appropriately.    SKIN - No suspicious lesions or rashes.  Respiration - No respiratory distress.  Head and Face - Normocephalic and atraumatic, with no gross asymmetry noted of the contour of the facial features.  The facial nerve is intact, with strong symmetric movements.    Voice and Breathing - The patient was breathing comfortably without the use of accessory muscles. The patients voice was clear and strong, and had appropriate pitch and quality.    Ears - Bilateral pinna and EACs with normal appearing overlying skin. Tympanic membrane intact with good mobility on pneumatic otoscopy bilaterally. Bony landmarks of the ossicular chain are normal. The tympanic membranes are normal in appearance. No retraction, perforation, or masses.  No fluid or purulence was seen in the external canal or the middle ear.     Eyes - Extraocular movements intact.  Sclera were not icteric or injected, conjunctiva were pink and moist.    Neuro - Nonfocal neuro exam is normal, CN 2 through 12 intact, normal gait and muscle tone.      Performed in clinic today:  Debridement simple right mastoid cavity:  Significant amount of debris is noted in the mastoid cavity especially laterally superiorly at the tegmen and lateral to the facial ridge.  Using cerumen curette using suction using alligator forceps large and small under the guidance of magnified speculum was able to carefully debride the cavity.  Mucosa underneath appears to be clear and well-healed.  Patient tolerated procedure well.      A/P - Tiesha Meadeger is a 70 year old female Patient presents with:  Follow Up    Patient presents with fullness pressure in the ear mastoid cavity full of debris.  Successful debridement was performed.  She did not wish her hearing test since she did not feel any change in her hearing lately.  Will see us back within a year for repeat exam possible debridement.        At  Tiesha next appointment they will need a hearing test.      Jake Solorzano MD

## 2021-08-11 ENCOUNTER — OFFICE VISIT (OUTPATIENT)
Dept: OTOLARYNGOLOGY | Facility: OTHER | Age: 71
End: 2021-08-11
Payer: MEDICARE

## 2021-08-11 ENCOUNTER — OFFICE VISIT (OUTPATIENT)
Dept: AUDIOLOGY | Facility: OTHER | Age: 71
End: 2021-08-11
Payer: MEDICARE

## 2021-08-11 VITALS
WEIGHT: 243 LBS | HEIGHT: 64 IN | SYSTOLIC BLOOD PRESSURE: 120 MMHG | BODY MASS INDEX: 41.48 KG/M2 | DIASTOLIC BLOOD PRESSURE: 80 MMHG

## 2021-08-11 DIAGNOSIS — H95.191 ENCOUNTER FOR MASTOIDECTOMY CAVITY DEBRIDEMENT, RIGHT: Primary | ICD-10-CM

## 2021-08-11 DIAGNOSIS — H93.8X1 SENSATION OF PLUGGED EAR, RIGHT: ICD-10-CM

## 2021-08-11 PROCEDURE — 92557 COMPREHENSIVE HEARING TEST: CPT | Performed by: AUDIOLOGIST

## 2021-08-11 PROCEDURE — 69220 CLEAN OUT MASTOID CAVITY: CPT | Mod: RT | Performed by: OTOLARYNGOLOGY

## 2021-08-11 PROCEDURE — 99207 PR NO CHARGE LOS: CPT | Performed by: AUDIOLOGIST

## 2021-08-11 PROCEDURE — 92567 TYMPANOMETRY: CPT | Performed by: AUDIOLOGIST

## 2021-08-11 PROCEDURE — 99207 PR DROP WITH A PROCEDURE: CPT | Performed by: OTOLARYNGOLOGY

## 2021-08-11 ASSESSMENT — MIFFLIN-ST. JEOR: SCORE: 1617.24

## 2021-08-11 ASSESSMENT — PAIN SCALES - GENERAL: PAINLEVEL: NO PAIN (0)

## 2021-08-11 NOTE — PROGRESS NOTES
AUDIOLOGY REPORT:    Patient was referred from ENT by Dr. Solorzano for audiology evaluation. The patient reports a history of three mastoid surgeries in her right ear. She reports that she has been having a plugged sensation, pressure, and slight pain in the right ear. The patient reports that she is unsure if she has had any hearing changes in the right ear due to the severity of her hearing loss. She notes decreased hearing in the left ear. The patient wears a hearing aid in the left ear. The patient was last tested at this clinic on 9/19/2018 and results showed severe to profound essentially sensorineural hearing loss in the right ear and moderate to severe sensorineural hearing loss in the left ear.    Testing:    Otoscopy:   Otoscopic exam indicates ears are clear of cerumen bilaterally     Tympanograms:    RIGHT: normal eardrum mobility     LEFT:   normal eardrum mobility    Thresholds:   Pure Tone Thresholds assessed using conventional audiometry with good reliability from 250-8000 Hz bilaterally using insert earphones and circumaural headphones     RIGHT:  severe to profound sensorineural hearing loss    LEFT:    moderately severe to severe sensorineural hearing loss    Speech Reception Threshold:    RIGHT: no response at 100 dB HL, speech detection threshold at 90 dB HL    LEFT:   70 dB HL  Results are in agreement with pure tone average.     Word Recognition Score:     RIGHT: 8% at 100 dB HL using NU-6 recorded word list.    LEFT:   60% at 100 dB HL using NU-6 recorded word list.    Discussed results with the patient. Compared to 9/19/2018, thresholds today are 10 dB poorer in the right ear at 250 Hz, 15 dB poorer in the left ear at 250 Hz, 10 dB better in the left ear at 6000 Hz, and stable at all other tested frequencies. Word recognition is poorer today in both ears to a degree that is likely clinically significant but is not statistically significant.    Patient was returned to ENT for follow up.      Sanaz Jeffery, CCC-A  Licensed Audiologist #[id number]  8/11/2021

## 2021-08-11 NOTE — LETTER
8/11/2021         RE: Tiesha Gurrola  [address] [facility] [address] [address] [facility] [id number]        Dear Colleague,    Thank you for referring your patient, Tiesha Gurrola, to the [facility]. Please see a copy of my visit note below.    History of Present Illness - Tiesha Gurrola is a 70 year old female presenting in clinic today for a recheck on Patient presents with:  Follow Up    Patient was seen almost 3 years ago.  She had a canal wall down tympanomastoidectomy on the right for extensive ear disease in the past.  Her has not heard her ear officially looked at that is the cavity or cleaned in or tuning of years.  She denies any discharge but the ear feels plugged and full now.  She wears hearing aid on the left side.  Left ear does not seem to bother her.        BP Readings from Last 1 Encounters:   08/11/21 120/80       BP noted to be well controlled today in office.     Tiesha IS a smoker/uses chewing tobacco.  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/2012     Paroxysmal atrial fibrillation (H) 7/2008     Paroxysmal supraventricular tachycardia (H)      Rotator cuff tear 10/17/2012     Tobacco abuse        Current Medications -   Current Outpatient Medications:      aspirin (ASA) 325 MG EC tablet, Take 1 tablet (325 mg) by mouth daily, Disp: , Rfl:      cetirizine (ZYRTEC) 10 MG tablet, Take 1 tablet (10 mg) by mouth daily as needed, Disp: , Rfl:      escitalopram (LEXAPRO) 10 MG tablet, Take 1 tablet (10 mg) by mouth daily, Disp: 90 tablet, Rfl: 3     flecainide (TAMBOCOR) 150 MG tablet, Take 1 tablet (150 mg) by mouth every 12 hours,  Disp: 180 tablet, Rfl: 3     simvastatin (ZOCOR) 10 MG tablet, Take 1 tablet (10 mg) by mouth daily, Disp: 90 tablet, Rfl: 3     albuterol (VENTOLIN HFA) 108 (90 BASE) MCG/ACT Inhaler, Inhale 2 puffs into the lungs every 4 hours as needed for shortness of breath / dyspnea or wheezing (Patient not taking: Reported on 5/3/2021), Disp: 1 Inhaler, Rfl: 1     fluticasone (FLOVENT HFA) 44 MCG/ACT inhaler, Inhale 2 puffs into the lungs 2 times daily (Patient not taking: Reported on 2021), Disp: 10.6 g, Rfl: 0     triamcinolone (KENALOG) 0.1 % cream, Apply twice daily as needed (Patient not taking: Reported on 2021), Disp: 80 g, Rfl: 0    Allergies -   Allergies   Allergen Reactions     Oxycodone Nausea and Vomiting       Social History -   Social History     Socioeconomic History     Marital status:      Spouse name: Not on file     Number of children: 3     Years of education: Not on file     Highest education level: Not on file   Occupational History     Comment: school cook   Tobacco Use     Smoking status: Former Smoker     Packs/day: 0.25     Years: 32.00     Pack years: 8.00     Types: Cigarettes     Quit date: 2021     Years since quittin.5     Smokeless tobacco: Never Used     Tobacco comment: 1 cigarette every two weeks   Substance and Sexual Activity     Alcohol use: No     Drug use: No     Sexual activity: Yes     Partners: Male     Comment: no b/c   Other Topics Concern     Parent/sibling w/ CABG, MI or angioplasty before 65F 55M? No      Service Not Asked     Blood Transfusions Not Asked     Caffeine Concern No     Occupational Exposure No     Hobby Hazards Not Asked     Sleep Concern No     Stress Concern No     Weight Concern No     Special Diet Not Asked     Back Care Not Asked     Exercise No     Bike Helmet Not Asked     Seat Belt Yes     Self-Exams Not Asked   Social History Narrative     Not on file     Social Determinants of Health     Financial Resource Strain:       Difficulty of Paying Living Expenses:    Food Insecurity:      Worried About Running Out of Food in the Last Year:      Ran Out of Food in the Last Year:    Transportation Needs:      Lack of Transportation (Medical):      Lack of Transportation (Non-Medical):    Physical Activity:      Days of Exercise per Week:      Minutes of Exercise per Session:    Stress:      Feeling of Stress :    Social Connections:      Frequency of Communication with Friends and Family:      Frequency of Social Gatherings with Friends and Family:      Attends Episcopal Services:      Active Member of Clubs or Organizations:      Attends Club or Organization Meetings:      Marital Status:    Intimate Partner Violence:      Fear of Current or Ex-Partner:      Emotionally Abused:      Physically Abused:      Sexually Abused:        Family History -   Family History   Problem Relation Age of Onset     Allergies Son         Hayfever     Heart Disease Son         Paroxysmal tach.     Arthritis Mother      Depression Mother         depression and anxiety     Respiratory Mother         Asthma     Arthritis Father      Heart Disease Father      Lipids Father      Arrhythmia Father      Pacemaker Father      Heart Surgery Father         bypass x3     Cancer Maternal Grandmother         Breast CA     Cancer Paternal Grandmother         ?? pancreatic CA     Osteoporosis Paternal Grandmother      Neurologic Disorder Daughter         ?? epilepsy     Obesity Daughter      Family History Negative Brother      Family History Negative Son      Family History Negative Brother      Diabetes Other         Maternal cousin --- [email or web address]     EYE* Other         Niece-- lazy eye     Heart Disease Paternal Uncle         death at 56/bypass surgery     Heart Disease Paternal Aunt        Review of Systems - As per HPI and PMHx, otherwise review of system review of the head and neck negative. Otherwise 10+ review of system is negative    Physical Exam  /80   Ht  "1.626 m (5' 4\")   Wt 110.2 kg (243 lb)   LMP 05/01/2009   BMI 41.71 kg/m    BMI: Body mass index is 41.71 kg/m .    General - The patient is well nourished and well developed, and appears to have good nutritional status.  Alert and oriented to person and place, answers questions and cooperates with examination appropriately.    SKIN - No suspicious lesions or rashes.  Respiration - No respiratory distress.  Head and Face - Normocephalic and atraumatic, with no gross asymmetry noted of the contour of the facial features.  The facial nerve is intact, with strong symmetric movements.    Voice and Breathing - The patient was breathing comfortably without the use of accessory muscles. The patients voice was clear and strong, and had appropriate pitch and quality.    Ears - Bilateral pinna and EACs with normal appearing overlying skin. Tympanic membrane intact with good mobility on pneumatic otoscopy bilaterally. Bony landmarks of the ossicular chain are normal. The tympanic membranes are normal in appearance. No retraction, perforation, or masses.  No fluid or purulence was seen in the external canal or the middle ear.     Eyes - Extraocular movements intact.  Sclera were not icteric or injected, conjunctiva were pink and moist.    Neuro - Nonfocal neuro exam is normal, CN 2 through 12 intact, normal gait and muscle tone.      Performed in clinic today:  Debridement simple right mastoid cavity:  Significant amount of debris is noted in the mastoid cavity especially laterally superiorly at the tegmen and lateral to the facial ridge.  Using cerumen curette using suction using alligator forceps large and small under the guidance of magnified speculum was able to carefully debride the cavity.  Mucosa underneath appears to be clear and well-healed.  Patient tolerated procedure well.      A/P - Tieshatanja Gurrola is a 70 year old female Patient presents with:  Follow Up    Patient presents with fullness pressure in the ear " mastoid cavity full of debris.  Successful debridement was performed.  She did not wish her hearing test since she did not feel any change in her hearing lately.  Will see us back within a year for repeat exam possible debridement.        At [place] next appointment they will need a hearing test.      Jake Solorzano MD          Again, thank you for allowing me to participate in the care of your patient.        Sincerely,        Jake Solorzano MD, MD

## 2021-11-03 DIAGNOSIS — F33.1 MAJOR DEPRESSIVE DISORDER, RECURRENT EPISODE, MODERATE (H): ICD-10-CM

## 2021-11-04 NOTE — TELEPHONE ENCOUNTER
Pending Prescriptions:                       Disp   Refills    escitalopram (LEXAPRO) 10 MG tablet [Pharm*90 tab*3        Sig: TAKE ONE TABLET (10 MG) BY MOUTH DAILY.    Routing refill request to provider for review/approval because:  Labs out of range:  PHQ-9 score:    PHQ 4/9/2021   PHQ-9 Total Score 3   Q9: Thoughts of better off dead/self-harm past 2 weeks Not at all             Not current

## 2021-11-05 RX ORDER — ESCITALOPRAM OXALATE 10 MG/1
TABLET ORAL
Qty: 30 TABLET | Refills: 0 | Status: SHIPPED | OUTPATIENT
Start: 2021-11-05 | End: 2021-12-10

## 2021-11-11 DIAGNOSIS — E78.5 HYPERLIPIDEMIA, UNSPECIFIED HYPERLIPIDEMIA TYPE: ICD-10-CM

## 2021-11-15 RX ORDER — SIMVASTATIN 10 MG
TABLET ORAL
Qty: 90 TABLET | Refills: 0 | Status: SHIPPED | OUTPATIENT
Start: 2021-11-15 | End: 2022-02-11

## 2021-12-08 DIAGNOSIS — F33.1 MAJOR DEPRESSIVE DISORDER, RECURRENT EPISODE, MODERATE (H): ICD-10-CM

## 2021-12-10 RX ORDER — ESCITALOPRAM OXALATE 10 MG/1
10 TABLET ORAL DAILY
Qty: 30 TABLET | Refills: 0 | Status: SHIPPED | OUTPATIENT
Start: 2021-12-10 | End: 2022-01-12

## 2021-12-10 NOTE — TELEPHONE ENCOUNTER
Pending Prescriptions:                       Disp   Refills    escitalopram (LEXAPRO) 10 MG tablet [Pharm*30 tab*0        Sig: TAKE ONE TABLET BY MOUTH ONCE DAILY    Routing refill request to provider for review/approval because:  PHQ-9 score:    PHQ 4/9/2021   PHQ-9 Total Score 3   Q9: Thoughts of better off dead/self-harm past 2 weeks Not at all              over 13 months, RN unable to provide a kiarra refill.

## 2021-12-31 NOTE — ASSESSMENT & PLAN NOTE
Dermatographism noted on examination. Symptomatic.     - Cetirizine 10mg PO bid. If symptoms continue then increase to 20mg PO bid.    31-Dec-2021 13:06

## 2022-01-08 DIAGNOSIS — F33.1 MAJOR DEPRESSIVE DISORDER, RECURRENT EPISODE, MODERATE (H): ICD-10-CM

## 2022-01-10 RX ORDER — ESCITALOPRAM OXALATE 10 MG/1
TABLET ORAL
Qty: 30 TABLET | Refills: 0 | OUTPATIENT
Start: 2022-01-10

## 2022-01-10 NOTE — TELEPHONE ENCOUNTER
Was notified last month that she needed appointment, appointment not made.  Will decline refill and ask that she make an appointment.

## 2022-01-12 DIAGNOSIS — F33.1 MAJOR DEPRESSIVE DISORDER, RECURRENT EPISODE, MODERATE (H): ICD-10-CM

## 2022-01-12 RX ORDER — ESCITALOPRAM OXALATE 10 MG/1
10 TABLET ORAL DAILY
Qty: 30 TABLET | Refills: 3 | Status: SHIPPED | OUTPATIENT
Start: 2022-01-12 | End: 2022-03-11

## 2022-01-12 NOTE — TELEPHONE ENCOUNTER
calls and reports that she was talking with pt about medications that are needed. Pt then stated that she was having covid like symptoms but was supposed to get her shot. When  said she would put her on hold, pt said ok and hung up.     Attempted to contact pt. Left message asking that she call the clinic back and speak to a triage nurse regarding her questions (medications, appt needed, covid symptoms, scheduled vaccine) as above.     Makenzie Dominguez, BOYDN, RN, PHN  Registered Nurse-Clinic Triage  [facility]  1/12/2022 at 1:49 PM

## 2022-01-12 NOTE — TELEPHONE ENCOUNTER
Patient called back with three concerns.   1. Requesting refill of her Lexapro.    - RX pended to run through protocol.   2. Patient is schedule for COVID vaccine at a local pharmacy this Friday but she is not feeling well. So wondering if she should cancel that appointment.   -RN advised to wait until she is feeling better and              to reschedule the vaccine appointment.   3. Requesting an appointment for Flu/COVID symptoms that she has had for the past 10 days.    - Patient scheduled for virtual appointment            tomorrow. She states she has body aches, fatigue, pressure in her ear, sinus pressure is now better, nausea and decreased appetite. Denies Fever, SOB, cough.

## 2022-01-13 ENCOUNTER — VIRTUAL VISIT (OUTPATIENT)
Dept: FAMILY MEDICINE | Facility: OTHER | Age: 72
End: 2022-01-13
Payer: MEDICARE

## 2022-01-13 DIAGNOSIS — Z20.822 PERSON UNDER INVESTIGATION FOR COVID-19: Primary | ICD-10-CM

## 2022-01-13 PROCEDURE — 99441 PR PHYSICIAN TELEPHONE EVALUATION 5-10 MIN: CPT | Mod: 95 | Performed by: NURSE PRACTITIONER

## 2022-01-13 NOTE — PROGRESS NOTES
"Tiesha is a 71 year old who is being evaluated via a billable telephone visit.      What phone number would you like to be contacted at  How would you like to obtain your AVS? MyChart    Assessment & Plan     Person under investigation for COVID-19  - Recommend viral testing given patients current symptoms.   - Patient denies any severe symptoms, discussed warning symptoms and when to go in for evaluation.   - Recommend that she continues with home cares.   - Symptomatic; Yes; 1/9/2022 COVID-19 Virus (Coronavirus) by PCR; Future  - Influenza A & B Antigen - Clinic Collect         The patient indicates understanding of these issues and agrees with the plan.    There are no Patient Instructions on file for this visit.    No follow-ups on file.    [facility]    Barron Motley is a 71 year old who presents for the following health issues     HPI     Patient notes that  last Sunday she felt like she was getting a head cold, she developed a sinus headache. She took some medication and went to bed and it was gone and then woke up and was better then the next day she had chills on and off. She notes that she would have chills off and on. Warm and cold. 4-5 days later she had body aches. Did not need medication for it. Taste is not 100 percent. Eating and drinking. She has not taken her temperature, but does not think she has a fever. No coughing and no troubles with breathing. Very intermittent cough, not all the time and not consistent. Sneezing off and on. She is not worse but not feeling good. She states she has \"sniffles\" but nose is not running.   She did take a covid at home test last Friday negative. This was a week and a half ago.       Review of Systems         Objective           Vitals:  No vitals were obtained today due to virtual visit.    Physical Exam   no distress  PSYCH: Alert and oriented times 3; coherent speech, normal   rate and volume, able to " articulate logical thoughts, able   to abstract reason, no tangential thoughts, no hallucinations   or delusions  Her affect is normal  RESP: No cough, no audible wheezing, able to talk in full sentences  Remainder of exam unable to be completed due to telephone visits    Diagnostic: None         Phone call duration: 9  minutes

## 2022-02-09 NOTE — TELEPHONE ENCOUNTER
Patient is calling to report she has an appointment coming up, but states she does not have refills to get to this appointment.  She is informed that this medication should have 3 refills and [facility] needs to give them to her.     She will call [facility] to get her refills.  Closing this encounter.  Isamar Houser, BOYDN, RN

## 2022-02-10 DIAGNOSIS — E78.5 HYPERLIPIDEMIA, UNSPECIFIED HYPERLIPIDEMIA TYPE: ICD-10-CM

## 2022-02-11 RX ORDER — SIMVASTATIN 10 MG
TABLET ORAL
Qty: 90 TABLET | Refills: 0 | Status: SHIPPED | OUTPATIENT
Start: 2022-02-11 | End: 2022-03-11

## 2022-03-11 ENCOUNTER — OFFICE VISIT (OUTPATIENT)
Dept: FAMILY MEDICINE | Facility: OTHER | Age: 72
End: 2022-03-11
Payer: MEDICARE

## 2022-03-11 VITALS
WEIGHT: 229 LBS | OXYGEN SATURATION: 97 % | HEART RATE: 64 BPM | BODY MASS INDEX: 39.09 KG/M2 | HEIGHT: 64 IN | RESPIRATION RATE: 16 BRPM | SYSTOLIC BLOOD PRESSURE: 124 MMHG | TEMPERATURE: 97.2 F | DIASTOLIC BLOOD PRESSURE: 60 MMHG

## 2022-03-11 DIAGNOSIS — R06.02 SOB (SHORTNESS OF BREATH): ICD-10-CM

## 2022-03-11 DIAGNOSIS — I48.0 PAROXYSMAL ATRIAL FIBRILLATION (H): ICD-10-CM

## 2022-03-11 DIAGNOSIS — F33.1 MAJOR DEPRESSIVE DISORDER, RECURRENT EPISODE, MODERATE (H): ICD-10-CM

## 2022-03-11 DIAGNOSIS — E78.5 HYPERLIPIDEMIA, UNSPECIFIED HYPERLIPIDEMIA TYPE: ICD-10-CM

## 2022-03-11 DIAGNOSIS — E66.01 SEVERE OBESITY (BMI 35.0-39.9) WITH COMORBIDITY (H): ICD-10-CM

## 2022-03-11 DIAGNOSIS — Z00.00 ENCOUNTER FOR MEDICARE ANNUAL WELLNESS EXAM: Primary | ICD-10-CM

## 2022-03-11 LAB
ALBUMIN SERPL-MCNC: 3.8 G/DL (ref 3.4–5)
ALP SERPL-CCNC: 56 U/L (ref 40–150)
ALT SERPL W P-5'-P-CCNC: 31 U/L (ref 0–50)
ANION GAP SERPL CALCULATED.3IONS-SCNC: 2 MMOL/L (ref 3–14)
AST SERPL W P-5'-P-CCNC: 11 U/L (ref 0–45)
BILIRUB SERPL-MCNC: 0.4 MG/DL (ref 0.2–1.3)
BUN SERPL-MCNC: 19 MG/DL (ref 7–30)
CALCIUM SERPL-MCNC: 8.9 MG/DL (ref 8.5–10.1)
CHLORIDE BLD-SCNC: 109 MMOL/L (ref 94–109)
CHOLEST SERPL-MCNC: 144 MG/DL
CO2 SERPL-SCNC: 29 MMOL/L (ref 20–32)
CREAT SERPL-MCNC: 0.75 MG/DL (ref 0.52–1.04)
FASTING STATUS PATIENT QL REPORTED: NO
GFR SERPL CREATININE-BSD FRML MDRD: 86 ML/MIN/1.73M2
GLUCOSE BLD-MCNC: 111 MG/DL (ref 70–99)
HDLC SERPL-MCNC: 39 MG/DL
LDLC SERPL CALC-MCNC: 62 MG/DL
NONHDLC SERPL-MCNC: 105 MG/DL
POTASSIUM BLD-SCNC: 4.2 MMOL/L (ref 3.4–5.3)
PROT SERPL-MCNC: 7.3 G/DL (ref 6.8–8.8)
SODIUM SERPL-SCNC: 140 MMOL/L (ref 133–144)
TRIGL SERPL-MCNC: 216 MG/DL

## 2022-03-11 PROCEDURE — 80061 LIPID PANEL: CPT | Performed by: FAMILY MEDICINE

## 2022-03-11 PROCEDURE — G0439 PPPS, SUBSEQ VISIT: HCPCS | Performed by: FAMILY MEDICINE

## 2022-03-11 PROCEDURE — 99214 OFFICE O/P EST MOD 30 MIN: CPT | Mod: 25 | Performed by: FAMILY MEDICINE

## 2022-03-11 PROCEDURE — 80053 COMPREHEN METABOLIC PANEL: CPT | Performed by: FAMILY MEDICINE

## 2022-03-11 PROCEDURE — 36415 COLL VENOUS BLD VENIPUNCTURE: CPT | Performed by: FAMILY MEDICINE

## 2022-03-11 RX ORDER — ALBUTEROL SULFATE 90 UG/1
2 AEROSOL, METERED RESPIRATORY (INHALATION) EVERY 4 HOURS PRN
Qty: 18 G | Refills: 1 | Status: SHIPPED | OUTPATIENT
Start: 2022-03-11 | End: 2022-12-15

## 2022-03-11 RX ORDER — ESCITALOPRAM OXALATE 10 MG/1
10 TABLET ORAL DAILY
Qty: 90 TABLET | Refills: 3 | Status: SHIPPED | OUTPATIENT
Start: 2022-03-11 | End: 2023-03-21

## 2022-03-11 RX ORDER — SIMVASTATIN 10 MG
10 TABLET ORAL DAILY
Qty: 90 TABLET | Refills: 3 | Status: SHIPPED | OUTPATIENT
Start: 2022-03-11 | End: 2023-05-05

## 2022-03-11 ASSESSMENT — ACTIVITIES OF DAILY LIVING (ADL)
DRESSING/BATHING_DIFFICULTY: NO
TOILETING_ISSUES: NO
DIFFICULTY_COMMUNICATING: NO
WEAR_GLASSES_OR_BLIND: NO
CONCENTRATING,_REMEMBERING_OR_MAKING_DECISIONS_DIFFICULTY: NO
TRANSFERRING: 0-->INDEPENDENT
DESCRIBE_HEARING_LOSS: HEARING LOSS ON RIGHT SIDE
DIFFICULTY_EATING/SWALLOWING: NO
HEARING_DIFFICULTY_OR_DEAF: YES
USE_OF_HEARING_ASSISTIVE_DEVICES: LEFT HEARING AID
FALL_HISTORY_WITHIN_LAST_SIX_MONTHS: NO
TRANSFERRING: 0-->INDEPENDENT
DOING_ERRANDS_INDEPENDENTLY_DIFFICULTY: NO
WALKING_OR_CLIMBING_STAIRS_DIFFICULTY: YES
DIFFICULTY_COMMUNICATING: NO
WERE_AUXILIARY_AIDS_OFFERED?: NO
CHANGE_IN_FUNCTIONAL_STATUS_SINCE_ONSET_OF_CURRENT_ILLNESS/INJURY: NO
PATIENT'S_PREFERRED_MEANS_OF_COMMUNICATION: ENGLISH SPEAKER WITH HEARING LOSS, NO SPEECH PROBLEMS.

## 2022-03-11 ASSESSMENT — PAIN SCALES - GENERAL: PAINLEVEL: NO PAIN (0)

## 2022-03-11 ASSESSMENT — PATIENT HEALTH QUESTIONNAIRE - PHQ9: SUM OF ALL RESPONSES TO PHQ QUESTIONS 1-9: 5

## 2022-03-11 NOTE — PROGRESS NOTES
"  Assessment & Plan     Encounter for Medicare annual wellness exam    Paroxysmal atrial fibrillation (H)  She follows with cardiology.  They have maintained her on flecainide.  She tells me they told her to stop her aspirin.    Hyperlipidemia, unspecified hyperlipidemia type  She continues on statin.  Labs are drawn.  Refills are given.    - simvastatin (ZOCOR) 10 MG tablet; Take 1 tablet (10 mg) by mouth daily  - COMPREHENSIVE METABOLIC PANEL  - Lipid panel reflex to direct LDL Fasting    Major depressive disorder, recurrent episode, moderate (H)  Mood is stable.  Lexapro was refilled.    - escitalopram (LEXAPRO) 10 MG tablet; Take 1 tablet (10 mg) by mouth daily    Severe obesity (BMI 35.0-39.9) with comorbidity (H)  Comorbid condition includes hyperlipidemia.  Regular exercise and healthy diet and weight loss were discussed.    SOB (shortness of breath)  Patient feels her shortness of breath has improved.  She had essentially normal echocardiogram.  Her pulmonary function test revealed possible mild disease.  She has not had her stress test and she will discuss this with cardiology.  She uses albuterol very rarely but does find it effective and therefore refill was given.    - albuterol (VENTOLIN HFA) 108 (90 Base) MCG/ACT inhaler; Inhale 2 puffs into the lungs every 4 hours as needed for shortness of breath / dyspnea or wheezing     Tobacco Cessation:   reports that she has been smoking cigarettes. She has a 8.00 pack-year smoking history. She has never used smokeless tobacco.  Tobacco Cessation Action Plan: Self help information given to patient    BMI:   Estimated body mass index is 39.31 kg/m  as calculated from the following:    Height as of this encounter: 1.626 m (5' 4\").    Weight as of this encounter: 103.9 kg (229 lb).   Weight management plan: Discussed healthy diet and exercise guidelines      Return in about 53 weeks (around 3/17/2023) for Annual Wellness Visit.    Jessy David MD  M " "[facility] CYN Motley is a 71 year old who presents for the following health issues     History of Present Illness       Reason for visit:  [facility]  Symptoms include:  None  Symptom progression:  Staying the same  Had these symptoms before:  No  What makes it worse:  No  What makes it better:  Ice cream every time I eat it!i    She eats 0-1 servings of fruits and vegetables daily.She consumes 0 sweetened beverage(s) daily.She exercises with enough effort to increase her heart rate 20 to 29 minutes per day.  She exercises with enough effort to increase her heart rate 3 or less days per week. She is missing 1 dose(s) of medications per week.     Due for follow up with Cardiology, was to have Lexiscan last year but had family issues that she was not able to make the appointment.  Father  of \"leaky valves.\"  Feels shortness of breath has improved, not as swollen.  Did review with her that her echocardiogram did not reveal any significant valvular disease.    Annual Wellness Visit    Patient has been advised of split billing requirements and indicates understanding: Yes     Are you in the first 12 months of your Medicare Part B coverage?  No    Physical Health:    In general, how would you rate your overall physical health? good    Outside of work, how many days during the week do you exercise?none    Outside of work, approximately how many minutes a day do you exercise?not applicable    If you drink alcohol do you typically have >3 drinks per day or >7 drinks per week? No    Do you usually eat at least 4 servings of fruit and vegetables a day, include whole grains & fiber and avoid regularly eating high fat or \"junk\" foods? Yes    Do you have any problems taking medications regularly? No    Do you have any side effects from medications? none    Needs assistance for the following daily activities: no assistance needed    Which of the following safety concerns are present in " "your home?  none identified     Hearing impairment: Yes, right ear deaf, left ear about 40%    In the past 6 months, have you been bothered by leaking of urine? yes    Mental Health:    In general, how would you rate your overall mental or emotional health? good  PHQ-2 Score: (P) 0    Do you feel safe in your environment? Yes    Have you ever done Advance Care Planning? (For example, a Health Directive, POLST, or a discussion with a medical provider or your loved ones about your wishes)? no    Fall risk:  Fallen 2 or more times in the past year?: No  Any fall with injury in the past year?: No    Cognitive Screenin) Repeat 3 items (Leader, Season, Table)    2) Clock draw: NORMAL  3) 3 item recall: Recalls 2 objects   Results: NORMAL clock, 1-2 items recalled: COGNITIVE IMPAIRMENT LESS LIKELY    Mini-CogTM Copyright S Nichole. Licensed by the author for use in [facility]; reprinted with permission (soob@[facility]). All rights reserved.        Current providers sharing in care for this patient include:   Patient Care Team:  Jessy David MD as PCP - General  Debi Nur PA-C as Assigned Heart and Vascular Provider  Jake Solorzano MD as Assigned Surgical Provider  Enriqueta Mcdonald PA-C as Assigned PCP      Review of Systems   CONSTITUTIONAL: NEGATIVE for fever, chills, change in weight  ENT/MOUTH: NEGATIVE for ear, mouth and throat problems  RESP: NEGATIVE for significant cough  CV: NEGATIVE for chest pain, palpitations or peripheral edema      Objective    /60   Pulse 64   Temp 97.2  F (36.2  C) (Temporal)   Resp 16   Ht 1.626 m (5' 4\")   Wt 103.9 kg (229 lb)   LMP 2009   SpO2 97%   BMI 39.31 kg/m    Body mass index is 39.31 kg/m .  Physical Exam  Constitutional:       General: She is not in acute distress.     Appearance: She is well-developed.   HENT:      Right Ear: Tympanic membrane and external ear normal.      Left Ear: Tympanic membrane and external ear " normal.      Nose: Nose normal.   Eyes:      General:         Right eye: No discharge.         Left eye: No discharge.      Conjunctiva/sclera: Conjunctivae normal.      Pupils: Pupils are equal, round, and reactive to light.   Neck:      Thyroid: No thyromegaly.      Trachea: No tracheal deviation.   Cardiovascular:      Rate and Rhythm: Normal rate and regular rhythm.      Heart sounds: Normal heart sounds, S1 normal and S2 normal. No murmur heard.  Pulmonary:      Effort: Pulmonary effort is normal. No respiratory distress.      Breath sounds: Normal breath sounds. No wheezing or rales.   Abdominal:      General: Bowel sounds are normal.      Palpations: Abdomen is soft. There is no mass.      Tenderness: There is no abdominal tenderness.   Musculoskeletal:         General: Normal range of motion.      Cervical back: Neck supple.   Lymphadenopathy:      Cervical: No cervical adenopathy.   Skin:     General: Skin is warm and dry.      Findings: No rash.   Neurological:      Mental Status: She is alert and oriented to person, place, and time.      Deep Tendon Reflexes: Reflexes are normal and symmetric.   Psychiatric:         Thought Content: Thought content normal.         Judgment: Judgment normal.

## 2022-03-11 NOTE — PATIENT INSTRUCTIONS
Patient Education   Personalized Prevention Plan  You are due for the preventive services outlined below.  Your care team is available to assist you in scheduling these services.  If you have already completed any of these items, please share that information with your care team to update in your medical record.  Health Maintenance Due   Topic Date Due     COVID-19 Vaccine (1) Never done     Zoster (Shingles) Vaccine (1 of 2) Never done     Flu Vaccine (1) 09/01/2021     Depression Assessment  10/09/2021     Comprehensive Metabolic Panel  04/09/2022     Cholesterol Lab  04/09/2022     FALL RISK ASSESSMENT  04/09/2022

## 2022-03-14 ENCOUNTER — TELEPHONE (OUTPATIENT)
Dept: FAMILY MEDICINE | Facility: OTHER | Age: 72
End: 2022-03-14
Payer: MEDICARE

## 2022-03-14 NOTE — TELEPHONE ENCOUNTER
----- Message from Virgilio Pate PA-C sent at 3/14/2022  9:35 AM CDT -----  I am answering these labs in the absence of her primary care provider.    Her LDL cholesterol is excellent at 62.  Her HDL is less than what we would like to see at 39.  Increase physical exercise and dietary changes strongly recommended to make some changes with respect to this as well as to bring her triglycerides down.    Her electrolytes kidney and liver function are within normal limits slight elevation in blood sugar shows some prediabetes tendencies.  Follow-up with primary care provider in 3 months as advised.    Electronically signed:    Virgilio Pate PA-C

## 2022-05-12 DIAGNOSIS — I48.91 ATRIAL FIBRILLATION (H): ICD-10-CM

## 2022-05-12 RX ORDER — FLECAINIDE ACETATE 150 MG/1
150 TABLET ORAL EVERY 12 HOURS
Qty: 180 TABLET | Refills: 0 | Status: SHIPPED | OUTPATIENT
Start: 2022-05-12 | End: 2022-06-22

## 2022-05-13 DIAGNOSIS — I48.0 PAROXYSMAL ATRIAL FIBRILLATION (H): Primary | ICD-10-CM

## 2022-06-22 ENCOUNTER — OFFICE VISIT (OUTPATIENT)
Dept: CARDIOLOGY | Facility: CLINIC | Age: 72
End: 2022-06-22
Attending: PHYSICIAN ASSISTANT
Payer: MEDICARE

## 2022-06-22 ENCOUNTER — HOSPITAL ENCOUNTER (OUTPATIENT)
Dept: CARDIOLOGY | Facility: CLINIC | Age: 72
Discharge: HOME | End: 2022-06-22
Attending: NURSE PRACTITIONER | Admitting: NURSE PRACTITIONER
Payer: MEDICARE

## 2022-06-22 VITALS
HEIGHT: 64 IN | BODY MASS INDEX: 36.25 KG/M2 | SYSTOLIC BLOOD PRESSURE: 124 MMHG | DIASTOLIC BLOOD PRESSURE: 74 MMHG | HEART RATE: 66 BPM | WEIGHT: 212.3 LBS | OXYGEN SATURATION: 96 %

## 2022-06-22 DIAGNOSIS — I48.0 PAROXYSMAL ATRIAL FIBRILLATION (H): ICD-10-CM

## 2022-06-22 PROCEDURE — 93010 ELECTROCARDIOGRAM REPORT: CPT | Performed by: NURSE PRACTITIONER

## 2022-06-22 PROCEDURE — 99214 OFFICE O/P EST MOD 30 MIN: CPT | Mod: 25 | Performed by: NURSE PRACTITIONER

## 2022-06-22 PROCEDURE — 93005 ELECTROCARDIOGRAM TRACING: CPT | Performed by: REHABILITATION PRACTITIONER

## 2022-06-22 RX ORDER — FLECAINIDE ACETATE 150 MG/1
150 TABLET ORAL EVERY 12 HOURS
Qty: 180 TABLET | Refills: 3 | Status: SHIPPED | OUTPATIENT
Start: 2022-06-22 | End: 2023-08-15

## 2022-06-22 NOTE — PROGRESS NOTES
General Cardiology Clinic Progress Note  Tiesha Gurrola MRN# [id number]   YOB: 1950 Age: 71 year old     Primary cardiologist: Dr. Vega    Reason for visit: Annual office visit    History of presenting illness:    Tiesha Gurrola, a pleasant 71 year old patient who has a past medical history significant for paroxysmal atrial fibrillation, RENU, dyslipidemia.    The patient has history of paroxysmal atrial fibrillation initially refractory to flecainide.  She underwent a PVI, SVC isolation and empiric ablation of CTI in 2016.  She had recurrent palpitations that was likely atrial fibrillation was placed back on flecainide with recommendations that a repeat ablation could be completed if she had ongoing symptomatic atrial fibrillation.  A Zio patch from June 2020 did not reveal recurrent atrial fibrillation but did show asymptomatic SVT and PACs.    At her last visit with Gabi Nur PA-C she noted dyspnea on exertion after quitting tobacco and new lower extremity edema.  An echocardiogram was obtained noting an LVEF of 60-65% and ABIs were normal.  Due to dyspnea on exertion is recommended and she undergo a Lexiscan nuclear stress test that was not completed.    Today she reports that her dyspnea on exertion has improved as well as her lower extremity edema.  She states she thought that she was retaining fluid due to some colonic issues.  Symptomatically she states she does not think she is at current prolonged episodes of atrial fibrillation and may occasionally have a 1 to 2-week palpitation.         Assessment and Plan:     ASSESSMENT:    1. Paroxsymal atrial fibrillation    Previously refractory to flecainide     Status post PVI, SVC isolation and empiric ablation of CTI in 2016 with Dr. Vega    Maintained on flecainide 150 mg twice daily    Infrequent palpitations that could be secondary to atrial fibrillation versus PACs    Echocardiogram from April 2021 noted normal LV function    EKG  today noted sinus rhythm at 61 bpm with 1st degree AVB with QRS duration of 112 (overread)    [id number] score 2 (age and gender)    2. Lower extremity edema with left lower extremity venous insufficiency    Venous competency study from May 2021 noted moderate to severe reflux of the GSV from the proximal to mid thigh and varicose veins communicating from the distal small saphenous vein of the posterior calf with severe reflux.    Currently asymptomatic    Previously trialed compression stockings and currently unable to wear    3. Hyperlipidemia    Simvastatin    PLAN:     1. Continue flecainide at 150 mg every 12 hours  2. Return to clinic in 1 year or sooner if needed  3. Patient instructed to alert the clinic if her lower extremity edema progresses and recommending a repeat venous competency study at [facility] in [place].       Orders this Visit:  Orders Placed This Encounter   Procedures     Follow-Up with Cardiology EP     EKG [facility] ([place])- performed today     Orders Placed This Encounter   Medications     flecainide (TAMBOCOR) 150 MG tablet     Sig: Take 1 tablet (150 mg) by mouth every 12 hours     Dispense:  180 tablet     Refill:  3     Medications Discontinued During This Encounter   Medication Reason     flecainide (TAMBOCOR) 150 MG tablet Reorder       Today's clinic visit entailed:  Review of the result(s) of each unique test - Echo, venous,, EKG  Prescription drug management  20 minutes spent on the date of the encounter doing chart review, history and exam, documentation and further activities per the note  Provider  Link to [facility] Help Grid     The level of medical decision making during this visit was of moderate complexity.           Review of Systems:     Review of Systems:  Skin:        Eyes:       ENT:  Positive for hearing loss  Respiratory:  Negative shortness of breath;cough;wheezing  Cardiovascular:  Negative;chest pain Positive  "for;palpitations;edema  Gastroenterology: Positive for constipation  Genitourinary:       Musculoskeletal:       Neurologic:  Negative numbness or tingling of hands;numbness or tingling of feet  Psychiatric:       Heme/Lymph/Imm:       Endocrine:                 Physical Exam:     Vitals: /74 (BP Location: Right arm, Patient Position: Sitting, Cuff Size: Adult Large)   Pulse 66   Ht 1.626 m (5' 4\")   Wt 96.3 kg (212 lb 4.8 oz)   LMP 05/01/2009   SpO2 96%   BMI 36.44 kg/m    Constitutional: Well nourished and in no apparent distress.  Eyes: Pupils equal, round. Sclerae anicteric.   HEENT: Normocephalic, atraumatic.   Neck: Supple. JVD   Respiratory: Breathing non-labored. Lungs clear to auscultation bilaterally. No crackles, wheezes, rhonchi, or rales.  Cardiovascular:  Regular rate and rhythm, normal S1 and S2. No murmur, rub, or gallop.  Skin: Warm, dry. No rashes, cyanosis, or xanthelasma.  Extremities: No edema.  Neurologic: No gross motor deficits. Alert, awake, and oriented to person, place and time.  Psychiatric: Affect appropriate.             Medications:     Current Outpatient Medications   Medication Sig Dispense Refill     albuterol (VENTOLIN HFA) 108 (90 Base) MCG/ACT inhaler Inhale 2 puffs into the lungs every 4 hours as needed for shortness of breath / dyspnea or wheezing 18 g 1     cetirizine (ZYRTEC) 10 MG tablet Take 1 tablet (10 mg) by mouth daily as needed       escitalopram (LEXAPRO) 10 MG tablet Take 1 tablet (10 mg) by mouth daily 90 tablet 3     flecainide (TAMBOCOR) 150 MG tablet Take 1 tablet (150 mg) by mouth every 12 hours 180 tablet 3     simvastatin (ZOCOR) 10 MG tablet Take 1 tablet (10 mg) by mouth daily 90 tablet 3       Family History   Problem Relation Age of Onset     Allergies Son         Hayfever     Heart Disease Son         Paroxysmal tach.     Arthritis Mother      Depression Mother         depression and anxiety     Respiratory Mother         Asthma     Arthritis " Father      Heart Disease Father      Lipids Father      Arrhythmia Father      Pacemaker Father      Heart Surgery Father         bypass x3     Cancer Maternal Grandmother         Breast CA     Cancer Paternal Grandmother         ?? pancreatic CA     Osteoporosis Paternal Grandmother      Neurologic Disorder Daughter         ?? epilepsy     Obesity Daughter      Family History Negative Brother      Family History Negative Son      Family History Negative Brother      Diabetes Other         Maternal cousin --- [email or web address]     EYE* Other         Niece-- lazy eye     Cancer Other      Heart Disease Paternal Uncle         death at 56/bypass surgery     Heart Disease Paternal Aunt        Social History     Socioeconomic History     Marital status:      Spouse name: Not on file     Number of children: 3     Years of education: Not on file     Highest education level: Not on file   Occupational History     Comment:    Tobacco Use     Smoking status: Current Every Day Smoker     Packs/day: 0.25     Years: 32.00     Pack years: 8.00     Types: Cigarettes     Last attempt to quit: 2021     Years since quittin.4     Smokeless tobacco: Never Used     Tobacco comment: 1 cigarette every two weeks   Vaping Use     Vaping Use: Never used   Substance and Sexual Activity     Alcohol use: No     Drug use: No     Sexual activity: Yes     Partners: Male     Comment: no b/c   Other Topics Concern     Parent/sibling w/ CABG, MI or angioplasty before 65F 55M? No      Service Not Asked     Blood Transfusions Not Asked     Caffeine Concern No     Occupational Exposure No     Hobby Hazards Not Asked     Sleep Concern No     Stress Concern No     Weight Concern No     Special Diet Not Asked     Back Care Not Asked     Exercise No     Bike Helmet Not Asked     Seat Belt Yes     Self-Exams Not Asked   Social History Narrative     Not on file     Social Determinants of Health     Financial Resource Strain: Not on  file   Food Insecurity: Not on file   Transportation Needs: Not on file   Physical Activity: Not on file   Stress: Not on file   Social Connections: Not on file   Intimate Partner Violence: Not on file   Housing Stability: Not on file            Past Medical History:     Past Medical History:   Diagnosis Date     Adhesive capsulitis      Adhesive capsulitis of shoulder 7/30/2013     History of blood transfusion      Hyperlipemia      Mitral valve disorder      Mitral valve disorders(424.0)     Hx of mitral valve prolapse     OA (osteoarthritis) of hip      Osteoarthritis of acromioclavicular joint 10/17/2012     Paroxysmal atrial fibrillation (H) 7/2008     Paroxysmal supraventricular tachycardia (H)      Rotator cuff tear 10/17/2012     Tobacco abuse               Past Surgical History:     Past Surgical History:   Procedure Laterality Date     CANALPLASTY Right 6/23/2016    Procedure: CANALPLASTY;  Surgeon: Rishabh Boudreaux MD;  Location: [facility] OR     COLONOSCOPY  07/11/07     CYSTOSCOPY N/A 8/20/2018    Procedure: CYSTOSCOPY;  cystoscopy, mid uretheral sling;  Surgeon: Kamari Sexton MD;  Location: [facility] OR     DILATION AND CURETTAGE  2/18/16    HD&C     DILATION AND CURETTAGE, OPERATIVE HYSTEROSCOPY, COMBINED N/A 2/18/2016    Procedure: COMBINED DILATION AND CURETTAGE, OPERATIVE HYSTEROSCOPY;  Surgeon: Keshia Chang DO;  Location: [facility] OR     GRAFT THIERSCH STATUS POST TYMPANOMASTOIDECTOMY Right 6/30/2016    Procedure: GRAFT THIERSCH STATUS POST TYMPANOMASTOIDECTOMY;  Surgeon: Rishabh Boudreaux MD;  Location: [facility] OR     H ABLATION FOCAL AFIB  10/12/16     HC LAPAROSCOPY, SURGICAL; APPENDECTOMY  08/27/2007     JOINT REPLACEMENT, HIP RT/LT Right 8/12/14    Joint Replacement Hip RT/LT     MEATOPLASTY EAR Right 6/23/2016    Procedure: MEATOPLASTY EAR;  Surgeon: Rishabh Boudreaux MD;  [facility]: [facility] OR     MYRINGOTOMY Right 4/26/2016    Procedure: MYRINGOTOMY;  Surgeon: Jake Solorzano MD;  Location: [facility]  OR     SHOULDER SURGERY Left 1/7/15    torn bicep, arthroplasty, rotator cuff     SLING TRANSVAGINAL N/A 8/20/2018    Procedure: SLING TRANSVAGINAL;;  Surgeon: Kamari Sexton MD;  [facility]     TYMPANOMASTOIDECTOMY Right 6/23/2016    Procedure: TYMPANOMASTOIDECTOMY;  Surgeon: Rishabh Boudreaux MD;  [facility]     TYMPANOMASTOIDECTOMY WITH FACIAL MONITORING  12/13/2011    Procedure:TYMPANOMASTOIDECTOMY WITH FACIAL MONITORING; right tympanoplasty, mastoidectomy with facial nerve monitoring; Surgeon:EVI LLAMAS; Location:[facility] OR     ZZC LAP,UTERUS,UNLISTED PROCEDURE  08/27/2007    Lyis of adhesions of the uterus to the abdominal wall.     ZZC TREAT ECTOPIC PREG,ABD PREG  1974    about 3 mos.              Allergies:   Oxycodone       Data:   All laboratory data reviewed:    Recent Labs   Lab Test 03/11/22  1154 04/09/21  1118 08/23/19  1250 06/13/18  0952 04/06/17  1254 03/10/17  1600   LDL 62 74 135* 114*   < >  --    HDL 39* 43* 43* 42*   < >  --    NHDL 105 101 175* 145*   < >  --    CHOL 144 144 218* 187   < >  --    TRIG 216* 136 198* 154*   < >  --    TSH  --   --   --  3.27  --  2.28   NTBNP  --  93  --   --   --   --     < > = values in this interval not displayed.       Lab Results   Component Value Date    WBC 6.7 05/05/2021    RBC 4.41 05/05/2021    HGB 13.6 05/05/2021    HCT 40.6 05/05/2021    MCV 92 05/05/2021    MCH 30.8 05/05/2021    MCHC 33.5 05/05/2021    RDW 12.4 05/05/2021     05/05/2021       Lab Results   Component Value Date     03/11/2022     04/09/2021    POTASSIUM 4.2 03/11/2022    POTASSIUM 4.5 04/09/2021    CHLORIDE 109 03/11/2022    CHLORIDE 107 04/09/2021    CO2 29 03/11/2022    CO2 28 04/09/2021    ANIONGAP 2 (L) 03/11/2022    ANIONGAP 4 04/09/2021     (H) 03/11/2022    GLC 89 04/09/2021    BUN 19 03/11/2022    BUN 17 04/09/2021    CR 0.75 03/11/2022    CR 0.82 04/09/2021    GFRESTIMATED 86 03/11/2022    GFRESTIMATED 73 04/09/2021     GFRESTBLACK 84 04/09/2021    CONSTANTIN 8.9 03/11/2022    CONSTANTIN 8.6 04/09/2021      Lab Results   Component Value Date    AST 11 03/11/2022    AST 11 04/09/2021    ALT 31 03/11/2022    ALT 26 04/09/2021       No results found for: A1C    Lab Results   Component Value Date    INR 0.99 10/12/2016         NAVI VANCE [facility]  Pager: [phone]  RN phone: [phone]

## 2022-06-22 NOTE — PATIENT INSTRUCTIONS
TODAY'S RECOMMENDATIONS:    EKG looks good  If call if leg swelling returns and will repeat vein ultrasound  Continue all medications without changes.  Please follow up with Dr. Vega in 1 year with EKG.    If you have questions or concerns please call clinic at [phone].    Please call [phone] for scheduling.      It was a pleasure seeing you today!

## 2022-06-22 NOTE — LETTER
6/22/2022    Jessy David MD  [address]  [facility] [address]    RE: Tiesha Gurrola       Dear Colleague,     I had the pleasure of seeing Tiesha Gurrola in the [facility].    General Cardiology Clinic Progress Note  Tiesha Gurrola MRN# [id number]   YOB: 1950 Age: 71 year old     Primary cardiologist: Dr. Vega    Reason for visit: Annual office visit    History of presenting illness:    Tiesha Gurrola, a pleasant 71 year old patient who has a past medical history significant for paroxysmal atrial fibrillation, RENU, dyslipidemia.    The patient has history of paroxysmal atrial fibrillation initially refractory to flecainide.  She underwent a PVI, SVC isolation and empiric ablation of CTI in 2016.  She had recurrent palpitations that was likely atrial fibrillation was placed back on flecainide with recommendations that a repeat ablation could be completed if she had ongoing symptomatic atrial fibrillation.  A Zio patch from June 2020 did not reveal recurrent atrial fibrillation but did show asymptomatic SVT and PACs.    At her last visit with Gabi Nur PA-C she noted dyspnea on exertion after quitting tobacco and new lower extremity edema.  An echocardiogram was obtained noting an LVEF of 60-65% and ABIs were normal.  Due to dyspnea on exertion is recommended and she undergo a Lexiscan nuclear stress test that was not completed.    Today she reports that her dyspnea on exertion has improved as well as her lower extremity edema.  She states she thought that she was retaining fluid due to some colonic issues.  Symptomatically she states she does not think she is at current prolonged episodes of atrial fibrillation and may occasionally have a 1 to 2-week palpitation.         Assessment and Plan:     ASSESSMENT:    1. Paroxsymal atrial fibrillation    Previously refractory to flecainide     Status post PVI, SVC isolation and empiric ablation of CTI in  2016 with Dr. Vega    Maintained on flecainide 150 mg twice daily    Infrequent palpitations that could be secondary to atrial fibrillation versus PACs    Echocardiogram from April 2021 noted normal LV function    EKG today noted sinus rhythm at 61 bpm with 1st degree AVB with QRS duration of 112 (overread)    [id number] score 2 (age and gender)    2. Lower extremity edema with left lower extremity venous insufficiency    Venous competency study from May 2021 noted moderate to severe reflux of the GSV from the proximal to mid thigh and varicose veins communicating from the distal small saphenous vein of the posterior calf with severe reflux.    Currently asymptomatic    Previously trialed compression stockings and currently unable to wear    3. Hyperlipidemia    Simvastatin    PLAN:     1. Continue flecainide at 150 mg every 12 hours  2. Return to clinic in 1 year or sooner if needed  3. Patient instructed to alert the clinic if her lower extremity edema progresses and recommending a repeat venous competency study at [facility] in [place].       Orders this Visit:  Orders Placed This Encounter   Procedures     Follow-Up with Cardiology EP     [facility]     Orders Placed This Encounter   Medications     flecainide (TAMBOCOR) 150 MG tablet     Sig: Take 1 tablet (150 mg) by mouth every 12 hours     Dispense:  180 tablet     Refill:  3     Medications Discontinued During This Encounter   Medication Reason     flecainide (TAMBOCOR) 150 MG tablet Reorder       Today's clinic visit entailed:  Review of the result(s) of each unique test - Echo, venous,, EKG  Prescription drug management  20 minutes spent on the date of the encounter doing chart review, history and exam, documentation and further activities per the note  Provider  Link to [facility] Help Grid     The level of medical decision making during this visit was of moderate complexity.           Review of  "Systems:     Review of Systems:  Skin:        Eyes:       ENT:  Positive for hearing loss  Respiratory:  Negative shortness of breath;cough;wheezing  Cardiovascular:  Negative;chest pain Positive for;palpitations;edema  Gastroenterology: Positive for constipation  Genitourinary:       Musculoskeletal:       Neurologic:  Negative numbness or tingling of hands;numbness or tingling of feet  Psychiatric:       Heme/Lymph/Imm:       Endocrine:                 Physical Exam:     Vitals: /74 (BP Location: Right arm, Patient Position: Sitting, Cuff Size: Adult Large)   Pulse 66   Ht 1.626 m (5' 4\")   Wt 96.3 kg (212 lb 4.8 oz)   LMP 05/01/2009   SpO2 96%   BMI 36.44 kg/m    Constitutional: Well nourished and in no apparent distress.  Eyes: Pupils equal, round. Sclerae anicteric.   HEENT: Normocephalic, atraumatic.   Neck: Supple. JVD   Respiratory: Breathing non-labored. Lungs clear to auscultation bilaterally. No crackles, wheezes, rhonchi, or rales.  Cardiovascular:  Regular rate and rhythm, normal S1 and S2. No murmur, rub, or gallop.  Skin: Warm, dry. No rashes, cyanosis, or xanthelasma.  Extremities: No edema.  Neurologic: No gross motor deficits. Alert, awake, and oriented to person, place and time.  Psychiatric: Affect appropriate.             Medications:     Current Outpatient Medications   Medication Sig Dispense Refill     albuterol (VENTOLIN HFA) 108 (90 Base) MCG/ACT inhaler Inhale 2 puffs into the lungs every 4 hours as needed for shortness of breath / dyspnea or wheezing 18 g 1     cetirizine (ZYRTEC) 10 MG tablet Take 1 tablet (10 mg) by mouth daily as needed       escitalopram (LEXAPRO) 10 MG tablet Take 1 tablet (10 mg) by mouth daily 90 tablet 3     flecainide (TAMBOCOR) 150 MG tablet Take 1 tablet (150 mg) by mouth every 12 hours 180 tablet 3     simvastatin (ZOCOR) 10 MG tablet Take 1 tablet (10 mg) by mouth daily 90 tablet 3       Family History   Problem Relation Age of Onset     Allergies " Son         Hayfever     Heart Disease Son         Paroxysmal tach.     Arthritis Mother      Depression Mother         depression and anxiety     Respiratory Mother         Asthma     Arthritis Father      Heart Disease Father      Lipids Father      Arrhythmia Father      Pacemaker Father      Heart Surgery Father         bypass x3     Cancer Maternal Grandmother         Breast CA     Cancer Paternal Grandmother         ?? pancreatic CA     Osteoporosis Paternal Grandmother      Neurologic Disorder Daughter         ?? epilepsy     Obesity Daughter      Family History Negative Brother      Family History Negative Son      Family History Negative Brother      Diabetes Other         Maternal cousin --- juev.onset     EYE* Other         Niece-- lazy eye     Cancer Other      Heart Disease Paternal Uncle         death at 56/bypass surgery     Heart Disease Paternal Aunt        Social History     Socioeconomic History     Marital status:      Spouse name: Not on file     Number of children: 3     Years of education: Not on file     Highest education level: Not on file   Occupational History     Comment:    Tobacco Use     Smoking status: Current Every Day Smoker     Packs/day: 0.25     Years: 32.00     Pack years: 8.00     Types: Cigarettes     Last attempt to quit: 2021     Years since quittin.4     Smokeless tobacco: Never Used     Tobacco comment: 1 cigarette every two weeks   Vaping Use     Vaping Use: Never used   Substance and Sexual Activity     Alcohol use: No     Drug use: No     Sexual activity: Yes     Partners: Male     Comment: no b/c   Other Topics Concern     Parent/sibling w/ CABG, MI or angioplasty before 65F 55M? No      Service Not Asked     Blood Transfusions Not Asked     Caffeine Concern No     Occupational Exposure No     Hobby Hazards Not Asked     Sleep Concern No     Stress Concern No     Weight Concern No     Special Diet Not Asked     Back Care Not Asked      Exercise No     Bike Helmet Not Asked     Seat Belt Yes     Self-Exams Not Asked   Social History Narrative     Not on file     Social Determinants of Health     Financial Resource Strain: Not on file   Food Insecurity: Not on file   Transportation Needs: Not on file   Physical Activity: Not on file   Stress: Not on file   Social Connections: Not on file   Intimate Partner Violence: Not on file   Housing Stability: Not on file            Past Medical History:     Past Medical History:   Diagnosis Date     Adhesive capsulitis      Adhesive capsulitis of shoulder 7/30/2013     History of blood transfusion      Hyperlipemia      Mitral valve disorder      Mitral valve disorders(424.0)     Hx of mitral valve prolapse     OA (osteoarthritis) of hip      Osteoarthritis of acromioclavicular joint 10/17/2012     Paroxysmal atrial fibrillation (H) 7/2008     Paroxysmal supraventricular tachycardia (H)      Rotator cuff tear 10/17/2012     Tobacco abuse               Past Surgical History:     Past Surgical History:   Procedure Laterality Date     CANALPLASTY Right 6/23/2016    Procedure: CANALPLASTY;  Surgeon: Rishabh Boudreaux MD;  Location: [facility] OR     COLONOSCOPY  07/11/07     CYSTOSCOPY N/A 8/20/2018    Procedure: CYSTOSCOPY;  cystoscopy, mid uretheral sling;  Surgeon: Kamari Sexton MD;  Location: [facility] OR     DILATION AND CURETTAGE  2/18/16    HD&C     DILATION AND CURETTAGE, OPERATIVE HYSTEROSCOPY, COMBINED N/A 2/18/2016    Procedure: COMBINED DILATION AND CURETTAGE, OPERATIVE HYSTEROSCOPY;  Surgeon: Keshia Chang DO;  Location: [facility] OR     GRAFT THIERSCH STATUS POST TYMPANOMASTOIDECTOMY Right 6/30/2016    Procedure: GRAFT THIERSCH STATUS POST TYMPANOMASTOIDECTOMY;  Surgeon: Rishabh Boudreaux MD;  Location: [facility] OR     H ABLATION FOCAL AFIB  10/12/16     HC LAPAROSCOPY, SURGICAL; APPENDECTOMY  08/27/2007     JOINT REPLACEMENT, HIP RT/LT Right 8/12/14    Joint Replacement Hip RT/LT     MEATOPLASTY EAR Right  6/23/2016    Procedure: MEATOPLASTY EAR;  Surgeon: Rishabh Boudreaux MD;  Location: [facility] OR     MYRINGOTOMY Right 4/26/2016    Procedure: MYRINGOTOMY;  Surgeon: Evi Solorzano MD;  Location: [facility] OR     SHOULDER SURGERY Left 1/7/15    torn bicep, arthroplasty, rotator cuff     SLING TRANSVAGINAL N/A 8/20/2018    Procedure: SLING TRANSVAGINAL;;  Surgeon: Kamari Sexton MD;  [facility]     TYMPANOMASTOIDECTOMY Right 6/23/2016    Procedure: TYMPANOMASTOIDECTOMY;  Surgeon: Rishabh Boudreaux MD;  [facility]: [facility]     TYMPANOMASTOIDECTOMY WITH FACIAL MONITORING  12/13/2011    Procedure:TYMPANOMASTOIDECTOMY WITH FACIAL MONITORING; right tympanoplasty, mastoidectomy with facial nerve monitoring; Surgeon:EVI SOLORZANO; Location: OR     ZZC LAP,UTERUS,UNLISTED PROCEDURE  08/27/2007    Lyis of adhesions of the uterus to the abdominal wall.     [facility] TREAT ECTOPIC PREG,ABD PREG  1974    about 3 mos.              Allergies:   Oxycodone       Data:   All laboratory data reviewed:    Recent Labs   Lab Test 03/11/22  1154 04/09/21  1118 08/23/19  1250 06/13/18  0952 04/06/17  1254 03/10/17  1600   LDL 62 74 135* 114*   < >  --    HDL 39* 43* 43* 42*   < >  --    NHDL 105 101 175* 145*   < >  --    CHOL 144 144 218* 187   < >  --    TRIG 216* 136 198* 154*   < >  --    TSH  --   --   --  3.27  --  2.28   NTBNP  --  93  --   --   --   --     < > = values in this interval not displayed.       Lab Results   Component Value Date    WBC 6.7 05/05/2021    RBC 4.41 05/05/2021    HGB 13.6 05/05/2021    HCT 40.6 05/05/2021    MCV 92 05/05/2021    MCH 30.8 05/05/2021    MCHC 33.5 05/05/2021    RDW 12.4 05/05/2021     05/05/2021       Lab Results   Component Value Date     03/11/2022     04/09/2021    POTASSIUM 4.2 03/11/2022    POTASSIUM 4.5 04/09/2021    CHLORIDE 109 03/11/2022    CHLORIDE 107 04/09/2021    CO2 29 03/11/2022    CO2 28 04/09/2021    ANIONGAP 2 (L) 03/11/2022    ANIONGAP 4 04/09/2021      (H) 03/11/2022    GLC 89 04/09/2021    BUN 19 03/11/2022    BUN 17 04/09/2021    CR 0.75 03/11/2022    CR 0.82 04/09/2021    GFRESTIMATED 86 03/11/2022    GFRESTIMATED 73 04/09/2021    GFRESTBLACK 84 04/09/2021    CONSTANTIN 8.9 03/11/2022    CONSTANTIN 8.6 04/09/2021      Lab Results   Component Value Date    AST 11 03/11/2022    AST 11 04/09/2021    ALT 31 03/11/2022    ALT 26 04/09/2021       No results found for: A1C    Lab Results   Component Value Date    INR 0.99 10/12/2016         NAVI VANCE CNP  [facility]  Pager: [phone]  RN phone: [phone]      Thank you for allowing me to participate in the care of your patient.      Sincerely,     NAVI VANCE CNP     [facility]  cc:   AVIS Mcconnell-MARGY  [address]  [place] [address]

## 2022-11-25 ENCOUNTER — TELEPHONE (OUTPATIENT)
Dept: FAMILY MEDICINE | Facility: OTHER | Age: 72
End: 2022-11-25

## 2022-11-25 NOTE — TELEPHONE ENCOUNTER
Patient is calling in to see if she can get antibiotics.  She thinks she has Influenza A.  Her grandson had it last week and now she is coughing up green phlegm.  Patient has had it for 5 days.  Patient chest also hurts from coughing so much.

## 2022-11-25 NOTE — TELEPHONE ENCOUNTER
RN Triage    Patient Contact    Attempt # 1    Was call answered?  No.  Left message on voicemail with information to call me back.    Bianca ELLIS, RN  [facility]

## 2022-11-25 NOTE — TELEPHONE ENCOUNTER
Antibiotics do not help with influenza and she is out of the window for Tamiflu.  Will send to triage to see if patient needs to be seen vs home cares.

## 2022-11-27 ENCOUNTER — NURSE TRIAGE (OUTPATIENT)
Dept: NURSING | Facility: CLINIC | Age: 72
End: 2022-11-27

## 2022-11-27 NOTE — TELEPHONE ENCOUNTER
Triage Call:    Caller: Patient     Influenza A is going around her house.  Patient is coughing up green stuff and her nose is pea green.  She has been sick for a week yesterday.    Patient is requesting antibiotics.      Advised patient that antibiotics do not help influenza, but if they are concerned she has a bacterial infection on top of it, they would then use antibiotics.  Advised that she would need to be seen and evaluated to have antibiotics prescribed and they may not be needed.         Protocol Recommended Disposition: Be seen in the next 3 days    Caller verbalized understanding of instructions and questions answered.      Cyndie Blanco RN on 11/27/2022 at 12:28 PM        Reason for Disposition    Prescription request for new medicine (not a refill)    Additional Information    Negative: MORE THAN A DOUBLE DOSE of a prescription or over-the-counter (OTC) drug    Negative: [1] DOUBLE DOSE (an extra dose or lesser amount) of prescription drug AND [2] any symptoms (e.g., dizziness, nausea, pain, sleepiness)    Negative: [1] DOUBLE DOSE (an extra dose or lesser amount) of over-the-counter (OTC) drug AND [2] any symptoms (e.g., dizziness, nausea, pain, sleepiness)    Negative: Took another person's prescription drug    Negative: [1] DOUBLE DOSE (an extra dose or lesser amount) of prescription drug AND [2] NO symptoms (Exception: a double dose of antibiotics)    Negative: Diabetes drug error or overdose (e.g., took wrong type of insulin or took extra dose)    Negative: [1] Prescription not at pharmacy AND [2] was prescribed by PCP recently (Exception: triager has access to EMR and prescription is recorded there. Go to Home Care and confirm for pharmacy.)    Negative: [1] Pharmacy calling with prescription question AND [2] triager unable to answer question    Negative: [1] Caller has URGENT medicine question about med that PCP or specialist prescribed AND [2] triager unable to answer question    Negative:  Medicine patch causing local rash or itching    Negative: [1] Caller has medicine question about med NOT prescribed by PCP AND [2] triager unable to answer question (e.g., compatibility with other med, storage)    Protocols used: MEDICATION QUESTION CALL-A-AH

## 2022-11-28 NOTE — TELEPHONE ENCOUNTER
RN Triage    Patient Contact    Attempt # 2    Was call answered?  No.  Left message on voicemail with information to call me back.    RHONDA Yan, RN  [facility] [place]  November 28, 2022

## 2022-11-29 NOTE — TELEPHONE ENCOUNTER
Message filed.  No call back.  Patient was triaged via another encounter on 11/27/22.  Pily Chang RN

## 2022-12-15 ENCOUNTER — OFFICE VISIT (OUTPATIENT)
Dept: FAMILY MEDICINE | Facility: OTHER | Age: 72
End: 2022-12-15
Payer: MEDICARE

## 2022-12-15 ENCOUNTER — TELEPHONE (OUTPATIENT)
Dept: FAMILY MEDICINE | Facility: OTHER | Age: 72
End: 2022-12-15

## 2022-12-15 VITALS
DIASTOLIC BLOOD PRESSURE: 62 MMHG | WEIGHT: 204 LBS | HEART RATE: 80 BPM | OXYGEN SATURATION: 94 % | BODY MASS INDEX: 34.83 KG/M2 | SYSTOLIC BLOOD PRESSURE: 134 MMHG | RESPIRATION RATE: 24 BRPM | TEMPERATURE: 97.4 F | HEIGHT: 64 IN

## 2022-12-15 DIAGNOSIS — L20.82 FLEXURAL ECZEMA: Primary | ICD-10-CM

## 2022-12-15 DIAGNOSIS — R06.02 SOB (SHORTNESS OF BREATH): ICD-10-CM

## 2022-12-15 PROCEDURE — 99214 OFFICE O/P EST MOD 30 MIN: CPT | Performed by: FAMILY MEDICINE

## 2022-12-15 RX ORDER — ALBUTEROL SULFATE 90 UG/1
2 AEROSOL, METERED RESPIRATORY (INHALATION) EVERY 4 HOURS PRN
Qty: 18 G | Refills: 1 | Status: SHIPPED | OUTPATIENT
Start: 2022-12-15

## 2022-12-15 RX ORDER — TRIAMCINOLONE ACETONIDE 1 MG/G
CREAM TOPICAL 2 TIMES DAILY
Qty: 30 G | Refills: 1 | Status: SHIPPED | OUTPATIENT
Start: 2022-12-15

## 2022-12-15 ASSESSMENT — PATIENT HEALTH QUESTIONNAIRE - PHQ9
SUM OF ALL RESPONSES TO PHQ QUESTIONS 1-9: 1
10. IF YOU CHECKED OFF ANY PROBLEMS, HOW DIFFICULT HAVE THESE PROBLEMS MADE IT FOR YOU TO DO YOUR WORK, TAKE CARE OF THINGS AT HOME, OR GET ALONG WITH OTHER PEOPLE: NOT DIFFICULT AT ALL
SUM OF ALL RESPONSES TO PHQ QUESTIONS 1-9: 1

## 2022-12-15 ASSESSMENT — PAIN SCALES - GENERAL: PAINLEVEL: NO PAIN (0)

## 2022-12-15 ASSESSMENT — ASTHMA QUESTIONNAIRES: ACT_TOTALSCORE: 24

## 2022-12-15 NOTE — PROGRESS NOTES
Assessment & Plan       ICD-10-CM    1. Flexural eczema  L20.82 triamcinolone (KENALOG) 0.1 % external cream      2. SOB (shortness of breath)  R06.02 albuterol (VENTOLIN HFA) 108 (90 Base) MCG/ACT inhaler        Patient does have some redness and some irritant areas that appear consistent with treated eczema.  She is not finding as much relief as she like and so we did discuss the risk factors including hot water and itching and she does have much comparable with control of her itching as she scratch continuously while she is here.  The best and most successful way to try and catch up with this is going to be getting her to stop this itching so we did talk about Vaseline or Aquaphor soaks and eczema gloves to see if we can get her to stop the itch scratch cycle.  She has previously used Kenalog and we did renew this so she can use this as well though and warned her not to use this with the gloves as it can trap it and and double the dosings so it is best to use this separately from the Aquaphor soaks.      30 minutes spent on the date of the encounter doing chart review, history and exam, documentation and further activities per the note      Return in about 2 weeks (around 12/29/2022) for if not improved.    Isamar Morgan MD, MD  [facility] CYN Motley is a 72 year old, presenting for the following health issues:  Derm Problem  Augmentin for 10 days for her sinus infection    History of Present Illness       Reason for visit:  Itching heartburn yeast infecrion  Symptom onset:  3-7 days ago  Symptoms include:  Itching heartburn yeast infection  Symptom intensity:  Severe  Symptom progression:  Staying the same  Had these symptoms before:  Yes  Has tried/received treatment for these symptoms:  Yes  Previous treatment was successful:  Yes  Prior treatment description:  Zertec daily  What makes it worse:  No  What makes it better:  No    She eats 2-3 servings of fruits and  "vegetables daily.She consumes 0 sweetened beverage(s) daily.She exercises with enough effort to increase her heart rate 10 to 19 minutes per day.  She exercises with enough effort to increase her heart rate 3 or less days per week.   She is taking medications regularly.    Today's PHQ-9         PHQ-9 Total Score: 1    PHQ-9 Q9 Thoughts of better off dead/self-harm past 2 weeks :   Not at all    How difficult have these problems made it for you to do your work, take care of things at home, or get along with other people: Not difficult at all       Asthma Follow-Up    Was ACT completed today?  Yes     Do you have a cough?  No    Are you experiencing any wheezing in your chest?  No    Do you have any shortness of breath?  No     How often are you using a short-acting (rescue) inhaler or nebulizer, such as Albuterol?  once every 4 months    How many days per week do you miss taking your asthma controller medication?  I do not have an asthma controller medication    Please describe any recent triggers for your asthma: weather    Have you had any Emergency Room Visits, Urgent Care Visits, or Hospital Admissions since your last office visit?  No        Review of Systems   Constitutional, HEENT, cardiovascular, pulmonary, GI, , musculoskeletal, neuro, skin, endocrine and psych systems are negative, except as otherwise noted.      Objective    /62   Pulse 80   Temp 97.4  F (36.3  C) (Temporal)   Resp 24   Ht 1.626 m (5' 4\")   Wt 92.5 kg (204 lb)   LMP 05/01/2009   SpO2 94%   BMI 35.02 kg/m    Body mass index is 35.02 kg/m .  Physical Exam   GENERAL: healthy, alert and no distress  HENT: ear canals and TM's normal, nose and mouth without ulcers or lesions  RESP: lungs clear to auscultation - no rales, rhonchi or wheezes  CV: regular rate and rhythm, normal S1 S2, no S3 or S4, no murmur, click or rub, no peripheral edema and peripheral pulses strong  MS: no gross musculoskeletal defects noted, no edema  SKIN: " Rash through L hand to her forearm and R ear that is pea sized; Has redness, irritation, some thickening, exoriation, with parched and flakey scaled appearance--consistent with chronic eczematous inflammation.    PSYCH: mentation appears normal, affect normal/bright, though appears easily confused with directions which I believe is due to hearing, so we wrote instructions for reinforcement.

## 2022-12-15 NOTE — PATIENT INSTRUCTIONS
Aquaphor to help - moisturize frequently  Eczema gloves   Hydrating eye drops      Water can be your best friend or worst enemy.    If water is not your enemy, shower/bath daily.    NEVER soak in bubble bath, oils, etc.    Keep them to 15-30 minutes in lukewarm (NOT HOT) water.  After shower, pat/blot dry and apply moisturizer within minutes    Products that are advised for eczema include:  Soaps -- Dove, Caress, or Basis (only need in underarms and groin unless dirt noted)  Lotions -- Cera Ve, Cetaphil, Vanicream, Vaseline  Petroleum jelly can be used for extra moisturizer when needed but is greasy.

## 2022-12-15 NOTE — TELEPHONE ENCOUNTER
Patient  was seen in urgent care 11/30/22.  COVID test was negative.  Was put on an antibiotic for 10 days (med rec states was augmentin 875-125 BID x10 days).  She completed antibiotic Saturday.  She states her sinus symptoms improved but she did develop a yeast infection.  She states did  OTC monistat and this is helping relieve her symptoms.  She states she also developed severe acid indigestion with the antibiotic and now has a lot of itching.  No rash.  Feels lobe of right ear slightly swollen.  She reports it is a deep itch on outside lobe of right ear, back of head at hairline, and her palms of hands.  She does take zyrtec 10mg daily and that did not help with the itching so took 15mg yesterday with no relief either.  I did advise her not to increase her zyrtec without MD approval.  Appointment today with Dr. Bloom.   can be at clinic by the arrive by time.  Is aware of snow/ice.  Pily HARPER RN

## 2023-03-21 DIAGNOSIS — F33.1 MAJOR DEPRESSIVE DISORDER, RECURRENT EPISODE, MODERATE (H): ICD-10-CM

## 2023-03-21 RX ORDER — ESCITALOPRAM OXALATE 10 MG/1
10 TABLET ORAL DAILY
Qty: 90 TABLET | Refills: 0 | Status: SHIPPED | OUTPATIENT
Start: 2023-03-21 | End: 2023-05-05

## 2023-05-05 ENCOUNTER — OFFICE VISIT (OUTPATIENT)
Dept: FAMILY MEDICINE | Facility: OTHER | Age: 73
End: 2023-05-05
Payer: MEDICARE

## 2023-05-05 VITALS
BODY MASS INDEX: 34.99 KG/M2 | RESPIRATION RATE: 18 BRPM | TEMPERATURE: 98.6 F | HEART RATE: 59 BPM | WEIGHT: 210 LBS | HEIGHT: 65 IN | DIASTOLIC BLOOD PRESSURE: 72 MMHG | SYSTOLIC BLOOD PRESSURE: 126 MMHG | OXYGEN SATURATION: 98 %

## 2023-05-05 DIAGNOSIS — R42 LIGHTHEADEDNESS: ICD-10-CM

## 2023-05-05 DIAGNOSIS — Z90.89 HISTORY OF MASTOIDECTOMY: ICD-10-CM

## 2023-05-05 DIAGNOSIS — F33.1 MAJOR DEPRESSIVE DISORDER, RECURRENT EPISODE, MODERATE (H): ICD-10-CM

## 2023-05-05 DIAGNOSIS — R19.8 ALTERED BOWEL FUNCTION: ICD-10-CM

## 2023-05-05 DIAGNOSIS — E66.01 SEVERE OBESITY (BMI 35.0-39.9) WITH COMORBIDITY (H): ICD-10-CM

## 2023-05-05 DIAGNOSIS — I48.0 PAROXYSMAL ATRIAL FIBRILLATION (H): ICD-10-CM

## 2023-05-05 DIAGNOSIS — Z00.00 ENCOUNTER FOR MEDICARE ANNUAL WELLNESS EXAM: Primary | ICD-10-CM

## 2023-05-05 DIAGNOSIS — E78.5 HYPERLIPIDEMIA, UNSPECIFIED HYPERLIPIDEMIA TYPE: ICD-10-CM

## 2023-05-05 LAB
ALBUMIN SERPL BCG-MCNC: 4.1 G/DL (ref 3.5–5.2)
ALP SERPL-CCNC: 57 U/L (ref 35–104)
ALT SERPL W P-5'-P-CCNC: 15 U/L (ref 10–35)
ANION GAP SERPL CALCULATED.3IONS-SCNC: 9 MMOL/L (ref 7–15)
AST SERPL W P-5'-P-CCNC: 19 U/L (ref 10–35)
BILIRUB SERPL-MCNC: 0.5 MG/DL
BUN SERPL-MCNC: 14.4 MG/DL (ref 8–23)
CALCIUM SERPL-MCNC: 9.3 MG/DL (ref 8.8–10.2)
CHLORIDE SERPL-SCNC: 105 MMOL/L (ref 98–107)
CHOLEST SERPL-MCNC: 141 MG/DL
CREAT SERPL-MCNC: 0.74 MG/DL (ref 0.51–0.95)
DEPRECATED HCO3 PLAS-SCNC: 25 MMOL/L (ref 22–29)
ERYTHROCYTE [DISTWIDTH] IN BLOOD BY AUTOMATED COUNT: 13.2 % (ref 10–15)
GFR SERPL CREATININE-BSD FRML MDRD: 87 ML/MIN/1.73M2
GLUCOSE SERPL-MCNC: 87 MG/DL (ref 70–99)
HCT VFR BLD AUTO: 40.8 % (ref 35–47)
HDLC SERPL-MCNC: 41 MG/DL
HGB BLD-MCNC: 13.6 G/DL (ref 11.7–15.7)
LDLC SERPL CALC-MCNC: 76 MG/DL
MCH RBC QN AUTO: 31 PG (ref 26.5–33)
MCHC RBC AUTO-ENTMCNC: 33.3 G/DL (ref 31.5–36.5)
MCV RBC AUTO: 93 FL (ref 78–100)
NONHDLC SERPL-MCNC: 100 MG/DL
PLATELET # BLD AUTO: 196 10E3/UL (ref 150–450)
POTASSIUM SERPL-SCNC: 4.2 MMOL/L (ref 3.4–5.3)
PROT SERPL-MCNC: 7.1 G/DL (ref 6.4–8.3)
RBC # BLD AUTO: 4.39 10E6/UL (ref 3.8–5.2)
SODIUM SERPL-SCNC: 139 MMOL/L (ref 136–145)
TRIGL SERPL-MCNC: 118 MG/DL
TSH SERPL DL<=0.005 MIU/L-ACNC: 4.09 UIU/ML (ref 0.3–4.2)
WBC # BLD AUTO: 5.7 10E3/UL (ref 4–11)

## 2023-05-05 PROCEDURE — 85027 COMPLETE CBC AUTOMATED: CPT | Performed by: FAMILY MEDICINE

## 2023-05-05 PROCEDURE — 80061 LIPID PANEL: CPT | Performed by: FAMILY MEDICINE

## 2023-05-05 PROCEDURE — 36415 COLL VENOUS BLD VENIPUNCTURE: CPT | Performed by: FAMILY MEDICINE

## 2023-05-05 PROCEDURE — 99214 OFFICE O/P EST MOD 30 MIN: CPT | Mod: 25 | Performed by: FAMILY MEDICINE

## 2023-05-05 PROCEDURE — G0439 PPPS, SUBSEQ VISIT: HCPCS | Performed by: FAMILY MEDICINE

## 2023-05-05 PROCEDURE — 84443 ASSAY THYROID STIM HORMONE: CPT | Performed by: FAMILY MEDICINE

## 2023-05-05 PROCEDURE — 80053 COMPREHEN METABOLIC PANEL: CPT | Performed by: FAMILY MEDICINE

## 2023-05-05 RX ORDER — SIMVASTATIN 10 MG
10 TABLET ORAL DAILY
Qty: 90 TABLET | Refills: 3 | Status: SHIPPED | OUTPATIENT
Start: 2023-05-05 | End: 2024-05-20

## 2023-05-05 RX ORDER — ESCITALOPRAM OXALATE 10 MG/1
10 TABLET ORAL DAILY
Qty: 90 TABLET | Refills: 3 | Status: SHIPPED | OUTPATIENT
Start: 2023-05-05 | End: 2024-07-08

## 2023-05-05 ASSESSMENT — ENCOUNTER SYMPTOMS
HEADACHES: 0
EYE PAIN: 0
HEMATURIA: 0
DYSURIA: 0
NAUSEA: 0
COUGH: 0
HEMATOCHEZIA: 0
MYALGIAS: 0
NERVOUS/ANXIOUS: 0
JOINT SWELLING: 0
SHORTNESS OF BREATH: 1
CONSTIPATION: 1
PALPITATIONS: 0
FREQUENCY: 0
FEVER: 0
BREAST MASS: 0
SORE THROAT: 0
DIZZINESS: 1
DIARRHEA: 0
ARTHRALGIAS: 1
HEARTBURN: 0
PARESTHESIAS: 0
WEAKNESS: 0
ABDOMINAL PAIN: 1
CHILLS: 0

## 2023-05-05 ASSESSMENT — ANXIETY QUESTIONNAIRES
1. FEELING NERVOUS, ANXIOUS, OR ON EDGE: NOT AT ALL
GAD7 TOTAL SCORE: 0
GAD7 TOTAL SCORE: 0
IF YOU CHECKED OFF ANY PROBLEMS ON THIS QUESTIONNAIRE, HOW DIFFICULT HAVE THESE PROBLEMS MADE IT FOR YOU TO DO YOUR WORK, TAKE CARE OF THINGS AT HOME, OR GET ALONG WITH OTHER PEOPLE: NOT DIFFICULT AT ALL
GAD7 TOTAL SCORE: 0
7. FEELING AFRAID AS IF SOMETHING AWFUL MIGHT HAPPEN: NOT AT ALL
4. TROUBLE RELAXING: NOT AT ALL
7. FEELING AFRAID AS IF SOMETHING AWFUL MIGHT HAPPEN: NOT AT ALL
8. IF YOU CHECKED OFF ANY PROBLEMS, HOW DIFFICULT HAVE THESE MADE IT FOR YOU TO DO YOUR WORK, TAKE CARE OF THINGS AT HOME, OR GET ALONG WITH OTHER PEOPLE?: NOT DIFFICULT AT ALL
3. WORRYING TOO MUCH ABOUT DIFFERENT THINGS: NOT AT ALL
5. BEING SO RESTLESS THAT IT IS HARD TO SIT STILL: NOT AT ALL
2. NOT BEING ABLE TO STOP OR CONTROL WORRYING: NOT AT ALL
6. BECOMING EASILY ANNOYED OR IRRITABLE: NOT AT ALL

## 2023-05-05 ASSESSMENT — ACTIVITIES OF DAILY LIVING (ADL): CURRENT_FUNCTION: NO ASSISTANCE NEEDED

## 2023-05-05 ASSESSMENT — PATIENT HEALTH QUESTIONNAIRE - PHQ9
10. IF YOU CHECKED OFF ANY PROBLEMS, HOW DIFFICULT HAVE THESE PROBLEMS MADE IT FOR YOU TO DO YOUR WORK, TAKE CARE OF THINGS AT HOME, OR GET ALONG WITH OTHER PEOPLE: SOMEWHAT DIFFICULT
SUM OF ALL RESPONSES TO PHQ QUESTIONS 1-9: 4
SUM OF ALL RESPONSES TO PHQ QUESTIONS 1-9: 4

## 2023-05-05 ASSESSMENT — PAIN SCALES - GENERAL: PAINLEVEL: SEVERE PAIN (7)

## 2023-05-05 NOTE — LETTER
May 8, 2023      Tiesha Gurrola  [address] [facility] [id number]        Dear ,    We are writing to inform you of your test results.    See comment below.    Resulted Orders   CBC with platelets   Result Value Ref Range    WBC Count 5.7 4.0 - 11.0 10e3/uL    RBC Count 4.39 3.80 - 5.20 10e6/uL    Hemoglobin 13.6 11.7 - 15.7 g/dL    Hematocrit 40.8 35.0 - 47.0 %    MCV 93 78 - 100 fL    MCH 31.0 26.5 - 33.0 pg    MCHC 33.3 31.5 - 36.5 g/dL    RDW 13.2 10.0 - 15.0 %    Platelet Count 196 150 - 450 10e3/uL   TSH with free T4 reflex   Result Value Ref Range    TSH 4.09 0.30 - 4.20 uIU/mL   COMPREHENSIVE METABOLIC PANEL   Result Value Ref Range    Sodium 139 136 - 145 mmol/L    Potassium 4.2 3.4 - 5.3 mmol/L    Chloride 105 98 - 107 mmol/L    Carbon Dioxide (CO2) 25 22 - 29 mmol/L    Anion Gap 9 7 - 15 mmol/L    Urea Nitrogen 14.4 8.0 - 23.0 mg/dL    Creatinine 0.74 0.51 - 0.95 mg/dL    Calcium 9.3 8.8 - 10.2 mg/dL    Glucose 87 70 - 99 mg/dL    Alkaline Phosphatase 57 35 - 104 U/L    AST 19 10 - 35 U/L    ALT 15 10 - 35 U/L    Protein Total 7.1 6.4 - 8.3 g/dL    Albumin 4.1 3.5 - 5.2 g/dL    Bilirubin Total 0.5 <=1.2 mg/dL    GFR Estimate 87 >60 mL/min/1.73m2      Comment:      eGFR calculated using 2021 CKD-EPI equation.   Lipid panel reflex to direct LDL Non-fasting   Result Value Ref Range    Cholesterol 141 <200 mg/dL    Triglycerides 118 <150 mg/dL    Direct Measure HDL 41 (L) >=50 mg/dL    LDL Cholesterol Calculated 76 <=100 mg/dL    Non HDL Cholesterol 100 <130 mg/dL    Narrative    Cholesterol  Desirable:  <200 mg/dL    Triglycerides  Normal:  Less than 150 mg/dL  Borderline High:  150-199 mg/dL  High:  200-499 mg/dL  Very High:  Greater than or equal to 500 mg/dL    Direct Measure HDL  Female:  Greater than or equal to 50 mg/dL   Male:  Greater than or equal to 40 mg/dL    LDL Cholesterol  Desirable:  <100mg/dL  Above Desirable:  100-129 mg/dL   Borderline High:  130-159 mg/dL   High:   160-189 mg/dL   Very High:  >= 190 mg/dL    Non HDL Cholesterol  Desirable:  130 mg/dL  Above Desirable:  130-159 mg/dL  Borderline High:  160-189 mg/dL  High:  190-219 mg/dL  Very High:  Greater than or equal to 220 mg/dL       I am responding to the results in the absence of the primary care provider who ordered them for you.     All of your labs are fairly consistent with what you have had over the last couple of years.  Do recommend increased physical exercise as best you are able to raise your HDL above 50 as a long-term goal. Following up with your primary care provider as previously directed is strongly recommended.       Virgilio Cruz PA-C

## 2023-05-05 NOTE — PROGRESS NOTES
"SUBJECTIVE:   Tiesha is a 72 year old who presents for Preventive Visit.      5/5/2023     1:17 PM   Additional Questions   Roomed by Arcelia WEAVER         5/5/2023     1:17 PM   Patient Reported Additional Medications   Patient reports taking the following new medications Biotin   Patient has been advised of split billing requirements and indicates understanding: Yes  Are you in the first 12 months of your Medicare coverage?  No    Healthy Habits:     In general, how would you rate your overall health?  Fair    Frequency of exercise:  2-3 days/week    Duration of exercise:  15-30 minutes    Do you usually eat at least 4 servings of fruit and vegetables a day, include whole grains    & fiber and avoid regularly eating high fat or \"junk\" foods?  No    Taking medications regularly:  Yes    Medication side effects:  Muscle aches and Lightheadedness    Ability to successfully perform activities of daily living:  No assistance needed    Home Safety:  No safety concerns identified    Hearing Impairment:  Difficulty following a conversation in a noisy restaurant or crowded room, need to ask people to speak up or repeat themselves, difficulty understanding soft or whispered speech and difficulty understanding speech on the telephone    In the past 6 months, have you been bothered by leaking of urine? Yes    In general, how would you rate your overall mental or emotional health?  Fair      PHQ-2 Total Score: 1    Additional concerns today:  Yes      Have you ever done Advance Care Planning? (For example, a Health Directive, POLST, or a discussion with a medical provider or your loved ones about your wishes): No, advance care planning information given to patient to review.  Patient plans to discuss their wishes with loved ones or provider.      Fall risk  Fallen 2 or more times in the past year?: No  Any fall with injury in the past year?: No    Cognitive Screening   1) Repeat 3 items (Leader, Season, Table)    2) Clock draw: " "ABNORMAL there are two number \"three\"s on the pt's clock  3) 3 item recall: Recalls 2 objects   Results: ABNORMAL clock, 1-2 items recalled: PROBABLE COGNITIVE IMPAIRMENT, **INFORM PROVIDER**    Mini-CogTM Copyright S Nichole. Licensed by the author for use in [facility]; reprinted with permission ([email or web address]). All rights reserved.        Reviewed and updated as needed this visit by clinical staff   Tobacco  Allergies  Meds  Problems  Med Hx  Surg Hx  Fam Hx          Reviewed and updated as needed this visit by Provider   Tobacco  Allergies  Meds  Problems  Med Hx  Surg Hx  Fam Hx         Social History     Tobacco Use     Smoking status: Every Day     Packs/day: 0.25     Years: 32.00     Pack years: 8.00     Types: Cigarettes     Last attempt to quit: 2021     Years since quittin.3     Smokeless tobacco: Never     Tobacco comments:     1 cigarette every two weeks   Vaping Use     Vaping status: Never Used     Passive vaping exposure: Yes   Substance Use Topics     Alcohol use: No             2023     1:16 PM   Alcohol Use   Prescreen: >3 drinks/day or >7 drinks/week? Not Applicable     Do you have a current opioid prescription? No  Do you use any other controlled substances or medications that are not prescribed by a provider? Alcohol    Colonoscopy, knee, dizziness? Says equilibrium is off.    Current providers sharing in care for this patient include:   Patient Care Team:  Jessy David MD as PCP - General  Jessy David MD as Assigned PCP  Hraika Ibrahim APRN CNP as Assigned Heart and Vascular Provider    The following health maintenance items are reviewed in Epic and correct as of today:  Health Maintenance   Topic Date Due     COVID-19 Vaccine (1) Never done     ZOSTER IMMUNIZATION (1 of 2) Never done     INFLUENZA VACCINE (1) 2022     CMP  2023     LIPID  2023     ANNUAL REVIEW OF HM ORDERS  2023     MAMMO SCREENING  2023 "     PHQ-9  11/05/2023     COLORECTAL CANCER SCREENING  02/05/2024     NICOTINE/TOBACCO CESSATION COUNSELING Q 1 YR  05/05/2024     MEDICARE ANNUAL WELLNESS VISIT  05/05/2024     FALL RISK ASSESSMENT  05/05/2024     DTAP/TDAP/TD IMMUNIZATION (2 - Td or Tdap) 06/22/2025     ADVANCE CARE PLANNING  04/09/2026     DEXA  06/18/2033     HEPATITIS C SCREENING  Completed     DEPRESSION ACTION PLAN  Completed     Pneumococcal Vaccine: 65+ Years  Completed     IPV IMMUNIZATION  Aged Out     MENINGITIS IMMUNIZATION  Aged Out     LUNG CANCER SCREENING  Discontinued       Review of Systems   Constitutional: Negative for chills and fever.   HENT: Positive for ear pain and hearing loss. Negative for congestion and sore throat.    Eyes: Negative for pain.   Respiratory: Positive for shortness of breath. Negative for cough.    Cardiovascular: Negative for chest pain, palpitations and peripheral edema.   Gastrointestinal: Positive for abdominal pain and constipation. Negative for diarrhea, heartburn, hematochezia and nausea.   Breasts:  Negative for tenderness, breast mass and discharge.   Genitourinary: Positive for urgency. Negative for dysuria, frequency, genital sores, hematuria, pelvic pain, vaginal bleeding and vaginal discharge.   Musculoskeletal: Positive for arthralgias (chronic in her shoulders.  Also left knee pain only when kneeling.). Negative for joint swelling and myalgias.   Skin: Negative for rash.   Neurological: Positive for dizziness. Negative for weakness, headaches and paresthesias.   Psychiatric/Behavioral: Negative for mood changes. The patient is not nervous/anxious.      States lost 30 pounds, which she relates to change in her bowels, used to be constipated and now has looser/softer stools.  No blood or mucous.    Has equilibrium issues when going down stairs or bending over.  Having pressure in her head.  This has been ongoing for 1 1/2 years.  Has a history of mastoiditis.      OBJECTIVE:   /72 (Cuff  "Size: Adult Large)   Pulse 59   Temp 98.6  F (37  C) (Oral)   Resp 18   Ht 1.643 m (5' 4.69\")   Wt 95.3 kg (210 lb)   LMP 05/01/2009   SpO2 98%   BMI 35.29 kg/m   Estimated body mass index is 35.29 kg/m  as calculated from the following:    Height as of this encounter: 1.643 m (5' 4.69\").    Weight as of this encounter: 95.3 kg (210 lb).  Physical Exam  Constitutional:       General: She is not in acute distress.     Appearance: She is well-developed.   HENT:      Right Ear: Tympanic membrane and external ear normal.      Left Ear: Tympanic membrane and external ear normal.      Nose: Nose normal.   Eyes:      General:         Right eye: No discharge.         Left eye: No discharge.      Conjunctiva/sclera: Conjunctivae normal.      Pupils: Pupils are equal, round, and reactive to light.   Neck:      Thyroid: No thyroid mass.   Cardiovascular:      Rate and Rhythm: Normal rate and regular rhythm.      Heart sounds: Normal heart sounds, S1 normal and S2 normal. No murmur heard.  Pulmonary:      Effort: Pulmonary effort is normal. No respiratory distress.      Breath sounds: Normal breath sounds. No wheezing or rales.   Abdominal:      General: Bowel sounds are normal.      Palpations: Abdomen is soft. There is no mass.      Tenderness: There is no abdominal tenderness.   Musculoskeletal:         General: Normal range of motion.      Cervical back: Neck supple.   Lymphadenopathy:      Cervical: No cervical adenopathy.   Skin:     General: Skin is warm and dry.      Findings: No rash.   Neurological:      Mental Status: She is alert and oriented to person, place, and time.   Psychiatric:         Thought Content: Thought content normal.         Judgment: Judgment normal.           ASSESSMENT / PLAN:   (Z00.00) Encounter for Medicare annual wellness exam  (primary encounter diagnosis)    (E78.5) Hyperlipidemia, unspecified hyperlipidemia type  Comment: Continues on statin.  Labs drawn.  Refills given.  Plan: " COMPREHENSIVE METABOLIC PANEL, Lipid panel         reflex to direct LDL Non-fasting, simvastatin         (ZOCOR) 10 MG tablet          (F33.1) Major depressive disorder, recurrent episode, moderate (H)  Comment: She feels her mood is stable on Lexapro.  She does not cry as easily.  Labs drawn.  Refills given.  Plan: escitalopram (LEXAPRO) 10 MG tablet, TSH with         free T4 reflex, CBC with platelets          (I48.0) Paroxysmal atrial fibrillation (H)  Comment: She follows with cardiology.  She is on flecainide for rate control.    (E66.01) Severe obesity (BMI 35.0-39.9) with comorbidity (H)  Comment: Comorbid conditions include sleep apnea and hyperlipidemia.  Patient has been losing weight, but states that it is unintentional.    (R19.8) Altered bowel function  Comment: Patient states that she lost weight because her bowels changed.  This happened about 6 months ago.  She went from constipated to looser stools.  She also states she had a few episodes where her stools were light yellow.  Her stools are back to normal color.  She denies having blood or mucus in her stools.  She feels she is starting to go back towards the constipation side.  Her last colonoscopy was 9 years ago and because this was a significant change in her bowels associated with weight loss I do feel it is reasonable to proceed with colonoscopy now.  Plan: Adult GI  Referral - Procedure Only          (R42) Lightheadedness  Comment: Patient tells me that she has had about a year and a half of lightheadedness.  She describes this as a pressure in her head.  She states it makes her feel like she is going to faint but denies having any vertigo or spinning sensation.  She has a history of mastoidectomy on the right side and does have hearing loss and wears a hearing aid in her left side.  She has seen ENT in the past but has not told them about the head pressure.  We discussed checking her electrolytes, thyroid and hemoglobin.  We will  "refer her to ENT.  If this is unremarkable may need to consider neurology consult.  Plan: Adult ENT  Referral          COUNSELING:  Reviewed preventive health counseling, as reflected in patient instructions      BMI:   Estimated body mass index is 35.29 kg/m  as calculated from the following:    Height as of this encounter: 1.643 m (5' 4.69\").    Weight as of this encounter: 95.3 kg (210 lb).   Weight management plan: Discussed healthy diet and exercise guidelines      She reports that she has been smoking cigarettes. She has a 8.00 pack-year smoking history. She has never used smokeless tobacco.  Nicotine/Tobacco Cessation Plan:   Information offered: Patient not interested at this time      Appropriate preventive services were discussed with this patient, including applicable screening as appropriate for cardiovascular disease, diabetes, osteopenia/osteoporosis, and glaucoma.  As appropriate for age/gender, discussed screening for colorectal cancer, prostate cancer, breast cancer, and cervical cancer. Checklist reviewing preventive services available has been given to the patient.    Reviewed patients plan of care and provided an AVS. The Basic Care Plan (routine screening as documented in Health Maintenance) for Tiesha meets the Care Plan requirement. This Care Plan has been established and reviewed with the Patient.          Jessy David MD  [facility]    Identified Health Risks:    Answers for HPI/ROS submitted by the patient on 5/5/2023  If you checked off any problems, how difficult have these problems made it for you to do your work, take care of things at home, or get along with other people?: Somewhat difficult  PHQ9 TOTAL SCORE: 4  GAGE 7 TOTAL SCORE: 0        The patient was provided with suggestions to help her develop a healthy physical lifestyle.  The patient was counseled and encouraged to consider modifying their diet and eating habits. She was provided " with information on recommended healthy diet options.  The patient was provided with written information regarding signs of hearing loss.  Information on urinary incontinence and treatment options given to patient.  The patient was provided with suggestions to help her develop a healthy emotional lifestyle.

## 2023-05-05 NOTE — PATIENT INSTRUCTIONS
Patient Education   Personalized Prevention Plan  You are due for the preventive services outlined below.  Your care team is available to assist you in scheduling these services.  If you have already completed any of these items, please share that information with your care team to update in your medical record.  Health Maintenance Due   Topic Date Due     COVID-19 Vaccine (1) Never done     Zoster (Shingles) Vaccine (1 of 2) Never done     Flu Vaccine (1) 09/01/2022     Comprehensive Metabolic Panel  03/11/2023     Cholesterol Lab  03/11/2023     ANNUAL REVIEW OF HM ORDERS  03/11/2023     Your Health Risk Assessment indicates you feel you are not in good health    A healthy lifestyle helps keep the body fit and the mind alert. It helps protect you from disease, helps you fight disease, and helps prevent chronic disease (disease that doesn't go away) from getting worse. This is important as you get older and begin to notice twinges in muscles and joints and a decline in the strength and stamina you once took for granted. A healthy lifestyle includes good healthcare, good nutrition, weight control, recreation, and regular exercise. Avoid harmful substances and do what you can to keep safe. Another part of a healthy lifestyle is stay mentally active and socially involved.    Good healthcare     Have a wellness visit every year.     If you have new symptoms, let us know right away. Don't wait until the next checkup.     Take medicines exactly as prescribed and keep your medicines in a safe place. Tell us if your medicine causes problems.   Healthy diet and weight control     Eat 3 or 4 small, nutritious, low-fat, high-fiber meals a day. Include a variety of fruits, vegetables, and whole-grain foods.     Make sure you get enough calcium in your diet. Calcium, vitamin D, and exercise help prevent osteoporosis (bone thinning).     If you live alone, try eating with others when you can. That way you get a good meal and  have company while you eat it.     Try to keep a healthy weight. If you eat more calories than your body uses for energy, it will be stored as fat and you will gain weight.     Recreation   Recreation is not limited to sports and team events. It includes any activity that provides relaxation, interest, enjoyment, and exercise. Recreation provides an outlet for physical, mental, and social energy. It can give a sense of worth and achievement. It can help you stay healthy.    Mental Exercise and Social Involvement  Mental and emotional health is as important as physical health. Keep in touch with friends and family. Stay as active as possible. Continue to learn and challenge yourself.   Things you can do to stay mentally active are:    Learn something new, like a foreign language or musical instrument.     Play SCRABBLE or do crossword puzzles. If you cannot find people to play these games with you at home, you can play them with others on your computer through the Internet.     Join a games club--anything from card games to chess or checkers or lawn bowling.     Start a new hobby.     Go back to school.     Volunteer.     Read.   Keep up with world events.    Understanding USDA MyPlate  The USDA has guidelines to help you make healthy food choices. These are called MyPlate. MyPlate shows the food groups that make up healthy meals using the image of a place setting. Before you eat, think about the healthiest choices for what to put on your plate or in your cup or bowl. To learn more about building a healthy plate, visit www.choosemyplate.gov.     The food groups    Fruits. Any fruit or 100% fruit juice counts as part of the Fruit Group. Fruits may be fresh, canned, frozen, or dried, and may be whole, cut-up, or pureed. Make 1/2 of your plate fruits and vegetables.    Vegetables. Any vegetable or 100% vegetable juice counts as a member of the Vegetable Group. Vegetables may be fresh, frozen, canned, or dried. They can  be served raw or cooked and may be whole, cut-up, or mashed. Make 1/2 of your plate fruits and vegetables.    Grains. All foods made from grains are part of the Grains Group. These include wheat, rice, oats, cornmeal, and barley. Grains are often used to make foods such as bread, pasta, oatmeal, cereal, tortillas, and grits. Grains should be no more than 1/4 of your plate. At least half of your grains should be whole grains.    Protein. This group includes meat, poultry, seafood, beans and peas, eggs, processed soy products (such as tofu), nuts (including nut butters), and seeds. Make protein choices no more than 1/4 of your plate. Meat and poultry choices should be lean or low fat.    Dairy. The Dairy Group includes all fluid milk products and foods made from milk that contain calcium, such as yogurt and cheese. (Foods that have little calcium, such as cream, butter, and cream cheese, are not part of this group.) Most dairy choices should be low-fat or fat-free.    Oils. Oils aren't a food group, but they do contain essential nutrients. However it's important to watch your intake of oils. These are fats that are liquid at room temperature. They include canola, corn, olive, soybean, vegetable, and sunflower oil. Foods that are mainly oil include mayonnaise, certain salad dressings, and soft margarines. You likely already get your daily oil allowance from the foods you eat.  Things to limit  Eating healthy also means limiting these things in your diet:    Salt (sodium). Many processed foods have a lot of sodium. To keep sodium intake down, eat fresh vegetables, meats, poultry, and seafood when possible. Purchase low-sodium, reduced-sodium, or no-salt-added food products at the store. And don't add salt to your meals at home. Instead, season them with herbs and spices such as dill, oregano, cumin, and paprika. Or try adding flavor with lemon or lime zest and juice.    Saturated fat. Saturated fats are most often found  in animal products such as beef, pork, and chicken. They are often solid at room temperature, such as butter. To reduce your saturated fat intake, choose leaner cuts of meat and poultry. And try healthier cooking methods such as grilling, broiling, roasting, or baking. For a simple lower-fat swap, use plain nonfat yogurt instead of mayonnaise when making potato salad or macaroni salad.    Added sugars. These are sugars added to foods. They are in foods such as ice cream, candy, soda, fruit drinks, sports drinks, energy drinks, cookies, pastries, jams, and syrups. Cut down on added sugars by sharing sweet treats with a family member or friend. You can also choose fruit for dessert, and drink water or other unsweetened beverages.  [employer] last reviewed this educational content on 6/1/2020 2000-2022 [employer]. All rights reserved. This information is not intended as a substitute for professional medical care. Always follow your healthcare professional's instructions.          Signs of Hearing Loss  Hearing loss is a problem shared by many people. In fact, it's one of the most common health problems, particularly as people age. Most people aged 65 and older have some hearing loss. By age 80, almost everyone does. Hearing loss often occurs slowly over the years. So, you may not realize your hearing has gotten worse.   When sudden hearing loss occurs, it's important to contact your healthcare provider right away. Your provider will do a medical exam and a hearing exam as soon as possible. This is to help find the cause and type of your sudden hearing loss. Based on your diagnosis, your healthcare provider will discuss possible treatments.      Hearing much better with one ear can be a sign of hearing loss.     Have your hearing checked  Call your healthcare provider if you:     Have to strain to hear normal conversation    Have to watch other people s faces very carefully to follow what they re  saying    Need to ask people to repeat what they ve said    Often misunderstand what people are saying    Turn the volume of the television or radio up so high that others complain    Feel that people are mumbling when they re talking to you    Find that the effort to hear leaves you feeling tired and irritated    Notice, when using the phone, that you hear better with one ear than the other  [employer] last reviewed this educational content on 6/1/2022 2000-2022 [employer]. All rights reserved. This information is not intended as a substitute for professional medical care. Always follow your healthcare professional's instructions.          Urinary Incontinence, Female (Adult)   Urinary incontinence means loss of bladder control. This problem affects many women, especially as they get older. If you have incontinence, you may be embarrassed to ask for help. But know that this problem can be treated.   Types of Incontinence  There are different types of incontinence. Two of the main types are described here. You can have more than one type.     Stress incontinence. With this type, urine leaks when pressure (stress) is put on the bladder. This may happen when you cough, sneeze, or laugh. Stress incontinence most often occurs because the pelvic floor muscles that support the bladder and urethra are weak. This can happen after pregnancy and vaginal childbirth or a hysterectomy. It can also be due to excess body weight or hormone changes.    Urge incontinence (also called overactive bladder). With this type, a sudden urge to urinate is felt often. This may happen even though there may not be much urine in the bladder. The need to urinate often during the night is common. Urge incontinence most often occurs because of bladder spasms. This may be due to bladder irritation or infection. Damage to bladder nerves or pelvic muscles, constipation, and certain medicines can also lead to urge  incontinence.  Treatment depends on the cause. Further evaluation is needed to find the type you have. This will likely include an exam and certain tests. Based on the results, you and your healthcare provider can then plan treatment. Until a diagnosis is made, the home care tips below can help ease symptoms.   Home care    Do pelvic floor muscle exercises, if they are prescribed. The pelvic floor muscles help support the bladder and urethra. Many women find that their symptoms improve when doing special exercises that strengthen these muscles. To do the exercises, contract the muscles you would use to stop your stream of urine. But do this when you re not urinating. Hold for 10 seconds, then relax. Repeat 10 to 20 times in a row, at least 3 times a day. Your healthcare provider may give you other instructions for how to do the exercises and how often.    Keep a bladder diary. This helps track how often and how much you urinate over a set period of time. Bring this diary with you to your next visit with the provider. The information can help your provider learn more about your bladder problem.    Lose weight, if advised to by your provider. Extra weight puts pressure on the bladder. Your provider can help you create a weight-loss plan that s right for you. This may include exercising more and making certain diet changes.    Don't have foods and drinks that may irritate the bladder. These can include alcohol and caffeinated drinks.    Quit smoking. Smoking and other tobacco use can lead to a long-term (chronic) cough that strains the pelvic floor muscles. Smoking may also damage the bladder and urethra. Talk with your provider about treatments or methods you can use to quit smoking.    If drinking large amounts of fluid makes you have symptoms, you may be advised to limit your fluid intake. You may also be advised to drink most of your fluids during the day and to limit fluids at night.    If you re worried about  urine leakage or accidents, you may wear absorbent pads to catch urine. Change the pads often. This helps reduce discomfort. It may also reduce the risk of skin or bladder infections.    Follow-up care  Follow up with your healthcare provider, or as directed. It may take some to find the right treatment for your problem. But healthy lifestyle changes can be made right away. These include such things as exercising on a regular basis, eating a healthy diet, losing weight (if needed), and quitting smoking. Your treatment plan may include special therapies or medicines. Certain procedures or surgery may also be options. Talk about any questions you have with your provider.   When to seek medical advice  Call the healthcare provider right away if any of these occur:    Fever of 100.4 F (38 C) or higher, or as directed by your provider    Bladder pain or fullness    Belly swelling    Nausea or vomiting    Back pain    Weakness, dizziness, or fainting  [employer] last reviewed this educational content on 1/1/2020 2000-2022 [employer]. All rights reserved. This information is not intended as a substitute for professional medical care. Always follow your healthcare professional's instructions.        Your Health Risk Assessment indicates you feel you are not in good emotional health.    Recreation   Recreation is not limited to sports and team events. It includes any activity that provides relaxation, interest, enjoyment, and exercise. Recreation provides an outlet for physical, mental, and social energy. It can give a sense of worth and achievement. It can help you stay healthy.    Mental Exercise and Social Involvement  Mental and emotional health is as important as physical health. Keep in touch with friends and family. Stay as active as possible. Continue to learn and challenge yourself.   Things you can do to stay mentally active are:    Learn something new, like a foreign language or musical instrument.      Play SCRABBLE or do crossword puzzles. If you cannot find people to play these games with you at home, you can play them with others on your computer through the Internet.     Join a games club--anything from card games to chess or checkers or lawn bowling.     Start a new hobby.     Go back to school.     Volunteer.     Read.   Keep up with world events.

## 2023-06-07 ENCOUNTER — TELEPHONE (OUTPATIENT)
Dept: GASTROENTEROLOGY | Facility: CLINIC | Age: 73
End: 2023-06-07
Payer: MEDICARE

## 2023-06-07 NOTE — TELEPHONE ENCOUNTER
"Endoscopy Scheduling Screen    Have you had a positive Covid test in the last 14 days?  No    Are you active on MyChart?   No    What insurance is in the chart?  Other:  MEDICARE/BCBS    Ordering/Referring Provider: Jessy David MD   (If ordering provider performs procedure, schedule with ordering provider unless otherwise instructed. )    BMI: Estimated body mass index is 35.29 kg/m  as calculated from the following:    Height as of 5/5/23: 1.643 m (5' 4.69\").    Weight as of 5/5/23: 95.3 kg (210 lb).     Sedation Ordered  moderate sedation.   If patient BMI > 50 do not schedule in [facility].    Are you taking any prescription medications for pain?   No     Are you taking methadone or Suboxone?  No    Do you have a history of malignant hyperthermia or adverse reaction to anesthesia?  No    (Females) Are you currently pregnant?   No     Have you been diagnosed or told you have pulmonary hypertension?   No    Do you have an LVAD?  No    Have you been told you have moderate to severe sleep apnea?  Yes (RN Review required for scheduling unless scheduling in Hospital.)    Have you been told you have COPD, asthma, or any other lung disease?  No    Do you have any heart conditions?  Yes ATRIAL FIB    In the past 6 months, have you had any hospitalizations for heart related issues including cardiomyopathy, heart attack, or stent placement?  No    Do you have any implantable devices in your body (pacemaker, ICD)?  No    Do you take nitroglycerine?  No    Have you ever had or are you awaiting a heart or lung transplant?   No    Have you had a stroke or transient ischemic attack (TIA aka \"mini stroke\" in the last 6 months?   No    Have you been diagnosed with or been told you have cirrhosis of the liver?   No    Are you currently on dialysis?   No    Do you need assistance transferring?   No    BMI: Estimated body mass index is 35.29 kg/m  as calculated from the following:    Height as of 5/5/23: 1.643 m (5' 4.69\").    " Weight as of 5/5/23: 95.3 kg (210 lb).     Is patients BMI > 40 and scheduling location [facility]?  No    Do you take the medication Phentermine, Ozempic or Wegovy?  No    Do you take the medication Naltrexone?  No    Do you take blood thinners?  No    Preferred Pharmacy:    [id number] - [place], [place] - [address] [facility]  [address] [facility] [address]  Phone: [phone] Fax: [phone]      Prep   Are you currently on dialysis or do you have chronic kidney disease?  No (standard prep)    Do you have a diagnosis of diabetes?  No    Do you have a diagnosis of cystic fibrosis (CF)?  No    On a regular basis do you go 3 -5 days between bowel movements?  Yes (Extended Prep)     BMI > 40?  No    Final Scheduling Details   Colonoscopy prep sent?  [facility] Extended Prep    Procedure scheduled  COLONOSCOPY     Surgeon:  TASHA     Date of procedure:  08/01/2023     Schedule PAC:   No    Location  [facility]    Sedation   MAC/Deep Sedation    Patient Reminders:    You will receive a call from a Nurse to review instructions and health history.  This assessment must be completed prior to your procedure.  Failure to complete the Nurse assessment may result in the procedure being cancelled.       On the day of your procedure, please designate an adult(s) who can drive you home stay with you for the next 24 hours. The medicines used in the exam will make you sleepy. You will not be able to drive.       You cannot take public transportation, ride share services, or non-medical taxi service without a responsible caregiver.  Medical transport services are allowed with the requirement that a responsible caregiver will receive you at your destination.  We require that drivers and caregivers are confirmed prior to your procedure.

## 2023-06-28 ENCOUNTER — PATIENT OUTREACH (OUTPATIENT)
Dept: CARE COORDINATION | Facility: CLINIC | Age: 73
End: 2023-06-28
Payer: MEDICARE

## 2023-07-19 NOTE — PROGRESS NOTES
Stress test I'd ordered for WOODSON wasn't done - no follow-up placed as had been waiting for results.    Due for EKG/OV for flecainide therapy. I believe a [place] pt. Placed orders for MARYAN/EKG to be done within the next 6-8 weeks        06/2023  normal as per patient

## 2023-07-26 ENCOUNTER — PATIENT OUTREACH (OUTPATIENT)
Dept: CARE COORDINATION | Facility: CLINIC | Age: 73
End: 2023-07-26
Payer: MEDICARE

## 2023-07-31 NOTE — H&P
[facility] History and Physical    Tiesha Gurrola MRN# [id number]   Age: 72 year old YOB: 1950     Date of Admission:  (Not on file)    Home clinic: [facility]  Primary care provider: Jessy David          Impression and Plan:   Impression:   Altered bowel function [R19.8]  Last colonoscopy 2014 - results not viewable      Plan:   Proceed to Colonoscopy as planned.  The procedure, risks(bleeding, perforation), benefits and alternatives were discussed and the patient agrees to proceed. Cleared for Anesthesia             Chief Complaint:   Altered bowel function [R19.8]    History is obtained from the patient          History of Present Illness:   This 72 year old female is being seen at this time for evaluation for colonoscopy.  C/o suddenly started having soft, stools.  Some colors.  Maternal aunt with colon ca at 84.             Past Medical History:     Past Medical History:   Diagnosis Date    Adhesive capsulitis     Adhesive capsulitis of shoulder 7/30/2013    History of blood transfusion     Hyperlipemia     Mitral valve disorder     Mitral valve disorders(424.0)     Hx of mitral valve prolapse    OA (osteoarthritis) of hip     Osteoarthritis of acromioclavicular joint 10/17/2012    Paroxysmal atrial fibrillation (H) 7/2008    Paroxysmal supraventricular tachycardia (H)     Rotator cuff tear 10/17/2012    Tobacco abuse             Past Surgical History:     Past Surgical History:   Procedure Laterality Date    CANALPLASTY Right 6/23/2016    Procedure: CANALPLASTY;  Surgeon: Rishabh Boudreaux MD;  [facility]    COLONOSCOPY  07/11/07    CYSTOSCOPY N/A 8/20/2018    Procedure: CYSTOSCOPY;  cystoscopy, mid uretheral sling;  Surgeon: Kamari Sexton MD;  [facility]    DILATION AND CURETTAGE  2/18/16    HD&C    DILATION AND CURETTAGE, OPERATIVE HYSTEROSCOPY, COMBINED N/A 2/18/2016    Procedure: COMBINED DILATION AND CURETTAGE, OPERATIVE  HYSTEROSCOPY;  Surgeon: Keshia Chang DO;  [facility]: [facility]    GRAFT THIERSCH STATUS POST TYMPANOMASTOIDECTOMY Right 2016    Procedure: GRAFT THIERSCH STATUS POST TYMPANOMASTOIDECTOMY;  Surgeon: Rishabh Boudreaux MD;  [facility]    H ABLATION FOCAL AFIB  10/12/16    HC LAPAROSCOPY, SURGICAL; APPENDECTOMY  2007    JOINT REPLACEMENT, HIP RT/LT Right 14    Joint Replacement Hip RT/LT    MEATOPLASTY EAR Right 2016    Procedure: MEATOPLASTY EAR;  Surgeon: Rishabh Boudreaux MD;  [facility]    MYRINGOTOMY Right 2016    Procedure: MYRINGOTOMY;  Surgeon: Evi Solorzano MD;  [facility]    SHOULDER SURGERY Left 1/7/15    torn bicep, arthroplasty, rotator cuff    SLING TRANSVAGINAL N/A 2018    Procedure: SLING TRANSVAGINAL;;  Surgeon: Kamari Sexton MD;  [facility]    TYMPANOMASTOIDECTOMY Right 2016    Procedure: TYMPANOMASTOIDECTOMY;  Surgeon: Rishabh Boudreaux MD;  [facility]    TYMPANOMASTOIDECTOMY WITH FACIAL MONITORING  2011    Procedure:TYMPANOMASTOIDECTOMY WITH FACIAL MONITORING; right tympanoplasty, mastoidectomy with facial nerve monitoring; Surgeon:EVI SOLORZANO R; Location: OR    ZZC LAP,UTERUS,UNLISTED PROCEDURE  2007    Lyis of adhesions of the uterus to the abdominal wall.    ZZC TREAT ECTOPIC PREG,ABD PREG  1974    about 3 mos.            Social History:     Social History     Tobacco Use    Smoking status: Every Day     Packs/day: 0.25     Years: 32.00     Pack years: 8.00     Types: Cigarettes     Last attempt to quit: 2021     Years since quittin.5    Smokeless tobacco: Never    Tobacco comments:     1 cigarette every two weeks   Substance Use Topics    Alcohol use: No            Family History:     Family History   Problem Relation Age of Onset    Allergies Son         Hayfever    Heart Disease Son         Paroxysmal tach.    Arthritis Mother     Depression Mother         depression and anxiety     Respiratory Mother         Asthma    Arthritis Father     Heart Disease Father     Lipids Father     Arrhythmia Father     Pacemaker Father     Heart Surgery Father         bypass x3    Cancer Maternal Grandmother         Breast CA    Cancer Paternal Grandmother         ?? pancreatic CA    Osteoporosis Paternal Grandmother     Neurologic Disorder Daughter         ?? epilepsy    Obesity Daughter     Family History Negative Brother     Family History Negative Son     Family History Negative Brother     Diabetes Other         Maternal cousin --- juev.onset    EYE* Other         Niece-- lazy eye    Cancer Other     Heart Disease Paternal Uncle         death at 56/bypass surgery    Heart Disease Paternal Aunt             Immunizations:     VACCINE/DOSE   Diptheria   DPT   DTAP   HBIG   Hepatitis A   Hepatitis B   HIB   Influenza   Measles   Meningococcal   MMR   Mumps   Pneumococcal   Polio   Rubella   Small Pox   TDAP   Varicella   Zoster            Allergies:     Allergies   Allergen Reactions    Oxycodone Nausea and Vomiting            Medications:     No current facility-administered medications for this encounter.     Current Outpatient Medications   Medication Sig    albuterol (VENTOLIN HFA) 108 (90 Base) MCG/ACT inhaler Inhale 2 puffs into the lungs every 4 hours as needed for shortness of breath or wheezing    bisacodyl (DULCOLAX) 5 MG EC tablet 2 days prior to procedure, take 2 tablets at 4 pm. 1 day prior to procedure, take 2 tablets at 4 pm. For additional instructions refer to your colonoscopy prep instructions.    cetirizine (ZYRTEC) 10 MG tablet Take 1 tablet (10 mg) by mouth daily as needed    escitalopram (LEXAPRO) 10 MG tablet Take 1 tablet (10 mg) by mouth daily    flecainide (TAMBOCOR) 150 MG tablet Take 1 tablet (150 mg) by mouth every 12 hours    polyethylene glycol (GOLYTELY) 236 g suspension 2 days prior at 5pm, mix and drink half of a jug of Golytely. Drink an 8 oz. glass of Golytely every 15  minutes until half of the jug is gone. Place remainder of Golytely in the refrigerator. 1 day prior at 5 pm, drink the 2nd half of a jug of Golytely bowel prep. 6 hours before your check-in time, drink an 8 oz. glass of Golytely every 15 minutes until half of the 2nd jug of Golytely is gone. Discard remainder of second jug.    simvastatin (ZOCOR) 10 MG tablet Take 1 tablet (10 mg) by mouth daily    triamcinolone (KENALOG) 0.1 % external cream Apply topically 2 times daily             Review of Systems:   The review of systems was positive for the following findings.  None.  The remainder of the review of systems was unremarkable.          Physical Exam:   All vitals have been reviewed  Last menstrual period 05/01/2009, not currently breastfeeding.  No intake or output data in the 24 hours ending 07/31/23 1343  SHEENT examination revealed NC/aT, EOMI.  Examination of the chest revealed CTA.  Examination of the heart revealed RRR.  Examination of the abdomen revealed soft, non tender.  The neuromuscular examination was NL.          Data:   All laboratory data reviewed  No results found for any visits on 08/01/23.  All imaging studies reviewed by me.     Clif Cooney MD, FACS

## 2023-08-01 ENCOUNTER — HOSPITAL ENCOUNTER (OUTPATIENT)
Facility: CLINIC | Age: 73
Discharge: HOME | End: 2023-08-01
Attending: SPECIALIST | Admitting: SPECIALIST
Payer: MEDICARE

## 2023-08-01 ENCOUNTER — ANESTHESIA EVENT (OUTPATIENT)
Dept: GASTROENTEROLOGY | Facility: CLINIC | Age: 73
End: 2023-08-01
Payer: MEDICARE

## 2023-08-01 ENCOUNTER — ANESTHESIA (OUTPATIENT)
Dept: GASTROENTEROLOGY | Facility: CLINIC | Age: 73
End: 2023-08-01
Payer: MEDICARE

## 2023-08-01 VITALS
SYSTOLIC BLOOD PRESSURE: 115 MMHG | OXYGEN SATURATION: 96 % | TEMPERATURE: 98.1 F | DIASTOLIC BLOOD PRESSURE: 67 MMHG | HEART RATE: 59 BPM | RESPIRATION RATE: 18 BRPM

## 2023-08-01 LAB — COLONOSCOPY: NORMAL

## 2023-08-01 PROCEDURE — 258N000003 HC RX IP 258 OP 636: Performed by: NURSE ANESTHETIST, CERTIFIED REGISTERED

## 2023-08-01 PROCEDURE — 45380 COLONOSCOPY AND BIOPSY: CPT | Mod: 59 | Performed by: SPECIALIST

## 2023-08-01 PROCEDURE — 250N000009 HC RX 250: Performed by: SPECIALIST

## 2023-08-01 PROCEDURE — 370N000017 HC ANESTHESIA TECHNICAL FEE, PER MIN: Performed by: SPECIALIST

## 2023-08-01 PROCEDURE — 250N000011 HC RX IP 250 OP 636: Performed by: NURSE ANESTHETIST, CERTIFIED REGISTERED

## 2023-08-01 PROCEDURE — 45385 COLONOSCOPY W/LESION REMOVAL: CPT

## 2023-08-01 PROCEDURE — 45385 COLONOSCOPY W/LESION REMOVAL: CPT | Performed by: SPECIALIST

## 2023-08-01 PROCEDURE — 45380 COLONOSCOPY AND BIOPSY: CPT | Performed by: SPECIALIST

## 2023-08-01 PROCEDURE — 88305 TISSUE EXAM BY PATHOLOGIST: CPT | Mod: TC | Performed by: SPECIALIST

## 2023-08-01 PROCEDURE — 250N000009 HC RX 250: Performed by: NURSE ANESTHETIST, CERTIFIED REGISTERED

## 2023-08-01 PROCEDURE — 88305 TISSUE EXAM BY PATHOLOGIST: CPT | Mod: 26 | Performed by: PATHOLOGY

## 2023-08-01 RX ORDER — LIDOCAINE 40 MG/G
CREAM TOPICAL
Status: DISCONTINUED | OUTPATIENT
Start: 2023-08-01 | End: 2023-08-01 | Stop reason: HOSPADM

## 2023-08-01 RX ORDER — SODIUM CHLORIDE, SODIUM LACTATE, POTASSIUM CHLORIDE, CALCIUM CHLORIDE 600; 310; 30; 20 MG/100ML; MG/100ML; MG/100ML; MG/100ML
INJECTION, SOLUTION INTRAVENOUS CONTINUOUS
Status: DISCONTINUED | OUTPATIENT
Start: 2023-08-01 | End: 2023-08-01 | Stop reason: HOSPADM

## 2023-08-01 RX ORDER — PROPOFOL 10 MG/ML
INJECTION, EMULSION INTRAVENOUS PRN
Status: DISCONTINUED | OUTPATIENT
Start: 2023-08-01 | End: 2023-08-01

## 2023-08-01 RX ORDER — ONDANSETRON 4 MG/1
4 TABLET, ORALLY DISINTEGRATING ORAL EVERY 30 MIN PRN
Status: DISCONTINUED | OUTPATIENT
Start: 2023-08-01 | End: 2023-08-01 | Stop reason: HOSPADM

## 2023-08-01 RX ORDER — LIDOCAINE HYDROCHLORIDE 10 MG/ML
INJECTION, SOLUTION INFILTRATION; PERINEURAL PRN
Status: DISCONTINUED | OUTPATIENT
Start: 2023-08-01 | End: 2023-08-01

## 2023-08-01 RX ORDER — PROPOFOL 10 MG/ML
INJECTION, EMULSION INTRAVENOUS CONTINUOUS PRN
Status: DISCONTINUED | OUTPATIENT
Start: 2023-08-01 | End: 2023-08-01

## 2023-08-01 RX ORDER — ONDANSETRON 2 MG/ML
4 INJECTION INTRAMUSCULAR; INTRAVENOUS EVERY 30 MIN PRN
Status: DISCONTINUED | OUTPATIENT
Start: 2023-08-01 | End: 2023-08-01 | Stop reason: HOSPADM

## 2023-08-01 RX ADMIN — PROPOFOL 50 MG: 10 INJECTION, EMULSION INTRAVENOUS at 12:30

## 2023-08-01 RX ADMIN — PROPOFOL 200 MCG/KG/MIN: 10 INJECTION, EMULSION INTRAVENOUS at 12:31

## 2023-08-01 RX ADMIN — LIDOCAINE HYDROCHLORIDE 40 MG: 10 INJECTION, SOLUTION INFILTRATION; PERINEURAL at 12:31

## 2023-08-01 RX ADMIN — PROPOFOL 50 MG: 10 INJECTION, EMULSION INTRAVENOUS at 12:31

## 2023-08-01 RX ADMIN — LIDOCAINE HYDROCHLORIDE 1 ML: 10 INJECTION, SOLUTION EPIDURAL; INFILTRATION; INTRACAUDAL; PERINEURAL at 12:06

## 2023-08-01 RX ADMIN — SODIUM CHLORIDE, POTASSIUM CHLORIDE, SODIUM LACTATE AND CALCIUM CHLORIDE: 600; 310; 30; 20 INJECTION, SOLUTION INTRAVENOUS at 12:05

## 2023-08-01 ASSESSMENT — ENCOUNTER SYMPTOMS: DYSRHYTHMIAS: 1

## 2023-08-01 ASSESSMENT — LIFESTYLE VARIABLES: TOBACCO_USE: 1

## 2023-08-01 ASSESSMENT — ACTIVITIES OF DAILY LIVING (ADL): ADLS_ACUITY_SCORE: 35

## 2023-08-01 NOTE — ANESTHESIA CARE TRANSFER NOTE
Patient: Tiesha Gurrola    Procedure: Procedure(s):  Colonoscopy with Polypectomy       Diagnosis: Altered bowel function [R19.8]  Diagnosis Additional Information: No value filed.    Anesthesia Type:   MAC     Note:    Oropharynx: oropharynx clear of all foreign objects and spontaneously breathing  Level of Consciousness: drowsy  Oxygen Supplementation: face mask    Independent Airway: airway patency satisfactory and stable  Dentition: dentition unchanged  Vital Signs Stable: post-procedure vital signs reviewed and stable  Report to RN Given: handoff report given  Patient transferred to: Phase II    Handoff Report: Identifed the Patient, Identified the Reponsible Provider, Reviewed the pertinent medical history, Discussed the surgical course, Reviewed Intra-OP anesthesia mangement and issues during anesthesia, Set expectations for post-procedure period and Allowed opportunity for questions and acknowledgement of understanding      Vitals:  Vitals Value Taken Time   /66 08/01/23 1310   Temp     Pulse 57 08/01/23 1310   Resp 16 08/01/23 1310   SpO2 99 % 08/01/23 1314   Vitals shown include unvalidated device data.    Electronically Signed By: NAVI Jacobsen CRNA  August 1, 2023  1:15 PM

## 2023-08-01 NOTE — ANESTHESIA POSTPROCEDURE EVALUATION
Patient: Tiesha Gurrola    Procedure: Procedure(s):  Colonoscopy with Polypectomy       Anesthesia Type:  MAC    Note:  Disposition: Outpatient   Postop Pain Control: Uneventful            Sign Out: Well controlled pain   PONV: No   Neuro/Psych: Uneventful            Sign Out: Acceptable/Baseline neuro status   Airway/Respiratory: Uneventful            Sign Out: Acceptable/Baseline resp. status   CV/Hemodynamics: Uneventful            Sign Out: Acceptable CV status   Other NRE: NONE   DID A NON-ROUTINE EVENT OCCUR? No    Event details/Postop Comments:  Pt was happy with anesthesia care.  No complications.  I will follow up with the pt if needed.           Last vitals:  Vitals Value Taken Time   /50 08/01/23 1330   Temp     Pulse 54 08/01/23 1330   Resp 18 08/01/23 1320   SpO2 96 % 08/01/23 1332   Vitals shown include unvalidated device data.    Electronically Signed By: NAVI Jacobsen CRNA  August 1, 2023  2:01 PM

## 2023-08-01 NOTE — DISCHARGE INSTRUCTIONS
[facility]    Home Care Following Endoscopy          Activity:  You have just undergone an endoscopic procedure usually performed with conscious sedation.  Do not work or operate machinery (including a car) for at least 12 hours.        Diet:  Return to the diet you were on before your procedure but eat lightly for the first 12-24 hours.  Drink plenty of water.  Resume any regular medications unless otherwise advised by your physician.  Please begin any new medication prescribed as a result of your procedure as directed by your physician.   If you had any biopsy or polyp removed please refrain from aspirin or aspirin products for 2 days.  If on Coumadin please restart as instructed by your physician.   Pain:  You may take Tylenol as needed for pain.  Expected Recovery:  You can expect some mild abdominal fullness and/or discomfort due to the air used to inflate your intestinal tract. I encourage you to walk and attempt to pass this air as soon as possible.    Call Your Physician if You Have:    After Colonoscopy:  Worsening persisting abdominal pain which is worse with activity.  Fevers (>101 degrees F), chills or shakes.  Passage of continued blood with bowel movements.     Any questions or concerns about your recovery, please call [phone] or after hours [phone] ([phone]) Nurse Advice Line.    Follow-up Care:  You did have polyps/biopsy tissue sample(s) removed.  The polyps/biopsy tissue sample(s) will be sent to pathology.    You should receive letter in your My Chart from Dr. Cooney with your results within 1-2 weeks. If you do not participate in My Chart a physical letter will come in the mail in 2-3 weeks.  Please call if you have not received a notification of your results.  If asked to return to clinic please make an appointment 1 week after your procedure.  Call [phone].

## 2023-08-01 NOTE — ANESTHESIA PREPROCEDURE EVALUATION
Anesthesia Pre-Procedure Evaluation    Patient: Tiesha Gurrola   MRN: [id number] : 1950        Procedure : Procedure(s):  Colonoscopy          Past Medical History:   Diagnosis Date    Adhesive capsulitis     Adhesive capsulitis of shoulder 2013    History of blood transfusion     Hyperlipemia     Mitral valve disorder     Mitral valve disorders(424.0)     Hx of mitral valve prolapse    OA (osteoarthritis) of hip     Osteoarthritis of acromioclavicular joint 10/17/2012    Paroxysmal atrial fibrillation (H) 2008    Paroxysmal supraventricular tachycardia (H)     Rotator cuff tear 10/17/2012    Tobacco abuse       Past Surgical History:   Procedure Laterality Date    CANALPLASTY Right 2016    Procedure: CANALPLASTY;  Surgeon: Rishabh Boudreaux MD;  [facility]    COLONOSCOPY  07    CYSTOSCOPY N/A 2018    Procedure: CYSTOSCOPY;  cystoscopy, mid uretheral sling;  Surgeon: Kamari Sexton MD;  [facility]    DILATION AND CURETTAGE  16    HD&C    DILATION AND CURETTAGE, OPERATIVE HYSTEROSCOPY, COMBINED N/A 2016    Procedure: COMBINED DILATION AND CURETTAGE, OPERATIVE HYSTEROSCOPY;  Surgeon: Keshia Chang DO;  Location: [facility]    GRAFT THIERSCH STATUS POST TYMPANOMASTOIDECTOMY Right 2016    Procedure: GRAFT THIERSCH STATUS POST TYMPANOMASTOIDECTOMY;  Surgeon: Rishabh Boudreaux MD;  [facility]    H ABLATION FOCAL AFIB  10/12/16    HC LAPAROSCOPY, SURGICAL; APPENDECTOMY  2007    JOINT REPLACEMENT, HIP RT/LT Right 14    Joint Replacement Hip RT/LT    MEATOPLASTY EAR Right 2016    Procedure: MEATOPLASTY EAR;  Surgeon: Rishabh Boudreaux MD;  [facility]    MYRINGOTOMY Right 2016    Procedure: MYRINGOTOMY;  Surgeon: Jake Solorzano MD;  [facility]    SHOULDER SURGERY Left 1/7/15    torn bicep, arthroplasty, rotator cuff    SLING TRANSVAGINAL N/A 2018    Procedure: SLING TRANSVAGINAL;;  Surgeon: Kamari Sexton  MD Juan R;  [facility]    TYMPANOMASTOIDECTOMY Right 2016    Procedure: TYMPANOMASTOIDECTOMY;  Surgeon: Rishabh Boudreaux MD;  [facility]    TYMPANOMASTOIDECTOMY WITH FACIAL MONITORING  2011    Procedure:TYMPANOMASTOIDECTOMY WITH FACIAL MONITORING; right tympanoplasty, mastoidectomy with facial nerve monitoring; Surgeon:EVI LLAMAS; Location:[facility]    ZZC LAP,UTERUS,UNLISTED PROCEDURE  2007    Lyis of adhesions of the uterus to the abdominal wall.    ZZC TREAT ECTOPIC PREG,ABD PREG  1974    about 3 mos.      Allergies   Allergen Reactions    Oxycodone Nausea and Vomiting      Social History     Tobacco Use    Smoking status: Every Day     Packs/day: 0.25     Years: 32.00     Pack years: 8.00     Types: Cigarettes     Last attempt to quit: 2021     Years since quittin.5    Smokeless tobacco: Never    Tobacco comments:     1 cigarette every two weeks   Substance Use Topics    Alcohol use: No      Wt Readings from Last 1 Encounters:   23 95.3 kg (210 lb)        Anesthesia Evaluation   Pt has had prior anesthetic. Type: MAC and General.    No history of anesthetic complications       ROS/MED HX  ENT/Pulmonary:     (+) sleep apnea,               tobacco use,                       Neurologic:  - neg neurologic ROS     Cardiovascular:     (+) Dyslipidemia - -   -  - -                        dysrhythmias, a-fib,        Previous cardiac testing   Echo: Date: 21 Results:  Interpretation Summary     1. Normal biventricular size and function. Left ventricular ejection fraction  of 60-65%. No segmental wall motion abnormalities noted.  2. Normal sized atria.  3. No hemodynamically significant valvular disease.  No prior study for comparison. Technically difficult study.  ______________________________________________________________________________  Left Ventricle  The left ventricle is normal in size. There is normal left ventricular wall  thickness. Left ventricular  systolic function is normal. The visual ejection  fraction is estimated at 60-65%. Left ventricular diastolic function is  normal. No regional wall motion abnormalities noted.     Right Ventricle  The right ventricle is normal in size and function.     Atria  Normal left atrial size. Right atrial size is normal.     Mitral Valve  The mitral valve leaflets appear normal. There is no evidence of stenosis,  fluttering, or prolapse. There is mild mitral annular calcification. There is  trace mitral regurgitation.     Tricuspid Valve  Normal tricuspid valve. There is trace tricuspid regurgitation.     Aortic Valve  The aortic valve is not well visualized. No hemodynamically significant  valvular aortic stenosis.     Pulmonic Valve  The pulmonic valve is not well visualized.     Vessels  The aortic root is normal size. Normal size ascending aorta. IVC diameter <2.1  cm collapsing >50% with sniff suggests a normal RA pressure of 3 mmHg.     Pericardium  There is no pericardial effusion.     Rhythm  Sinus rhythm was noted.    Stress Test:  Date: Results:    ECG Reviewed:  Date: 5/3/21 Results:  Sinus  Rhythm    First degree A-V block   Cath:  Date: Results:      METS/Exercise Tolerance: >4 METS    Hematologic:  - neg hematologic  ROS     Musculoskeletal:  - neg musculoskeletal ROS     GI/Hepatic:  - neg GI/hepatic ROS     Renal/Genitourinary:  - neg Renal ROS     Endo:     (+)               Obesity,       Psychiatric/Substance Use:     (+) psychiatric history depression       Infectious Disease:  - neg infectious disease ROS     Malignancy:  - neg malignancy ROS     Other:  - neg other ROS          Physical Exam    Airway  airway exam normal      Mallampati: II   TM distance: > 3 FB   Neck ROM: full   Mouth opening: > 3 cm    Respiratory Devices and Support         Dental  no notable dental history         Cardiovascular   cardiovascular exam normal       Rhythm and rate: regular and normal     Pulmonary   pulmonary exam  normal        breath sounds clear to auscultation           OUTSIDE LABS:  CBC:   Lab Results   Component Value Date    WBC 5.7 05/05/2023    WBC 6.7 05/05/2021    HGB 13.6 05/05/2023    HGB 13.6 05/05/2021    HCT 40.8 05/05/2023    HCT 40.6 05/05/2021     05/05/2023     05/05/2021     BMP:   Lab Results   Component Value Date     05/05/2023     03/11/2022    POTASSIUM 4.2 05/05/2023    POTASSIUM 4.2 03/11/2022    CHLORIDE 105 05/05/2023    CHLORIDE 109 03/11/2022    CO2 25 05/05/2023    CO2 29 03/11/2022    BUN 14.4 05/05/2023    BUN 19 03/11/2022    CR 0.74 05/05/2023    CR 0.75 03/11/2022    GLC 87 05/05/2023     (H) 03/11/2022     COAGS:   Lab Results   Component Value Date    INR 0.99 10/12/2016     POC:   Lab Results   Component Value Date     (H) 10/13/2016    HCG Negative 08/27/2007     HEPATIC:   Lab Results   Component Value Date    ALBUMIN 4.1 05/05/2023    PROTTOTAL 7.1 05/05/2023    ALT 15 05/05/2023    AST 19 05/05/2023    ALKPHOS 57 05/05/2023    BILITOTAL 0.5 05/05/2023     OTHER:   Lab Results   Component Value Date    CONSTANTIN 9.3 05/05/2023    TSH 4.09 05/05/2023    CRP 10.0 (H) 03/27/2017    SED 10 03/27/2017       Anesthesia Plan    ASA Status:  3    NPO Status:  NPO Appropriate    Anesthesia Type: MAC.     - Reason for MAC: Deep or markedly invasive procedure (G8)   Induction: Intravenous.   Maintenance: TIVA.        Consents    Anesthesia Plan(s) and associated risks, benefits, and realistic alternatives discussed. Questions answered and patient/representative(s) expressed understanding.     - Discussed: Risks, Benefits and Alternatives for BOTH SEDATION and the PROCEDURE were discussed     - Discussed with:  Patient      - Extended Intubation/Ventilatory Support Discussed: No.      - Patient is DNR/DNI Status: No     Use of blood products discussed: No .     Postoperative Care    Pain management: Multi-modal analgesia.   PONV prophylaxis: Background Propofol  Infusion     Comments:    Other Comments: The risks and benefits of anesthesia, and the alternatives where applicable, have been discussed with the patient, and they wish to proceed.              NAVI Jacobsen CRNA

## 2023-08-03 LAB
PATH REPORT.COMMENTS IMP SPEC: NORMAL
PATH REPORT.COMMENTS IMP SPEC: NORMAL
PATH REPORT.FINAL DX SPEC: NORMAL
PATH REPORT.GROSS SPEC: NORMAL
PATH REPORT.MICROSCOPIC SPEC OTHER STN: NORMAL
PATH REPORT.RELEVANT HX SPEC: NORMAL
PHOTO IMAGE: NORMAL

## 2023-08-15 DIAGNOSIS — I48.0 PAROXYSMAL ATRIAL FIBRILLATION (H): ICD-10-CM

## 2023-08-15 RX ORDER — FLECAINIDE ACETATE 150 MG/1
150 TABLET ORAL EVERY 12 HOURS
Qty: 180 TABLET | Refills: 3 | Status: SHIPPED | OUTPATIENT
Start: 2023-08-15 | End: 2024-09-03

## 2023-08-15 NOTE — TELEPHONE ENCOUNTER
Last Written Prescription Date:  6/22/22  Last Fill Quantity: 180,  # refills: 3   Last office visit: 6/22/2022    Future Office Visit:  11/1/23 with Dr.Pham Sydney Borrego on 8/15/2023 at 8:10 AM

## 2023-08-15 NOTE — TELEPHONE ENCOUNTER
"Routing refill request to provider for review/approval because:  Drug not on the [facility] [place] [place] refill protocol, needs approval from authorizing provider. Patient has upcoming appointment with Dr. Vega in November.     Requested Prescriptions   Pending Prescriptions Disp Refills    flecainide (TAMBOCOR) 150 MG tablet 180 tablet 3     Sig: Take 1 tablet (150 mg) by mouth every 12 hours       Anti Arrhythmic Agents Protocol Failed - 8/15/2023  8:11 AM        Failed - Medication needs approval from authorizing provider     Please forward this request to the authorizing provider for approval.           Failed - Recent (6 mo) or future (30 days) visit with authorizing provider's specialty     Patient had office visit in the last 6 months or has a visit in the next 30 days with authorizing provider.  See \"Patient Info\" tab in inbasket, or \"Choose Columns\" in Meds & Orders section of the refill encounter.            Passed - Lipid Panel on file in past year     Recent Labs   Lab Test 05/05/23  1359 01/19/16  0915 08/20/15  1001   CHOL 141   < > 210*   TRIG 118   < > 213*   HDL 41*   < > 36*   LDL 76   < > 131*   NHDL 100   < >  --    VLDL  --   --  43*   CHOLHDLRATIO  --   --  5.8*    < > = values in this interval not displayed.               Passed - CBC on file in past year     Recent Labs   Lab Test 05/05/23  1359   WBC 5.7   RBC 4.39   HGB 13.6   HCT 40.8                    Passed - ALT on file in past year     Recent Labs   Lab Test 05/05/23  1359   ALT 15             Passed - Serum Creatinine on file in past year     Recent Labs   Lab Test 05/05/23  1359   CR 0.74       Ok to refill medication if creatinine is low          Passed - Serum Sodium on file in past year     Recent Labs   Lab Test 05/05/23  1359                 Passed - Serum Potassium on file in past year     Recent Labs   Lab Test 05/05/23  1359   POTASSIUM 4.2             Passed - Patient is 18 years of age or older        Passed - Patient is not " pregnant        Passed - No positive pregnancy test on file in past year              Nupur Santos RN   [facility]

## 2023-11-01 ENCOUNTER — OFFICE VISIT (OUTPATIENT)
Dept: CARDIOLOGY | Facility: CLINIC | Age: 73
End: 2023-11-01
Payer: MEDICARE

## 2023-11-01 VITALS
HEART RATE: 65 BPM | SYSTOLIC BLOOD PRESSURE: 135 MMHG | DIASTOLIC BLOOD PRESSURE: 80 MMHG | HEIGHT: 62 IN | WEIGHT: 210 LBS | RESPIRATION RATE: 17 BRPM | BODY MASS INDEX: 38.64 KG/M2 | OXYGEN SATURATION: 100 %

## 2023-11-01 DIAGNOSIS — I48.0 PAROXYSMAL ATRIAL FIBRILLATION (H): ICD-10-CM

## 2023-11-01 PROCEDURE — 99214 OFFICE O/P EST MOD 30 MIN: CPT | Performed by: INTERNAL MEDICINE

## 2023-11-01 PROCEDURE — 93000 ELECTROCARDIOGRAM COMPLETE: CPT | Performed by: INTERNAL MEDICINE

## 2023-11-01 NOTE — PROGRESS NOTES
Electrophysiology Clinic Progress Note    Tiesha Gurrola MRN# [id number]   YOB: 1950 Age: 72 year old     Primary cardiologist:          Assessment and Plan   Thank you for allowing me to participate in care of this very delightful patient.  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  She underwent isolation of pulmonary veins and empiric ablation of the CTI at that time.     When I saw her last in 2019 I recommend her to taper off the flecainide to truly  the success of the ablation.  Tiesha did reduce the dose of flecainide from 150 the morning to 75 at night and was doing quite well for 2 months until she decided to take 75 mg twice a day.  Unfortunately, with a low dose of 75 mg twice daily  she started to have more palpitations and decided to go back to 150 mg in the morning and 75 mg at night which was increased to 150 mg bid. Despite her having palpitations no documentation of AF since ablation 7 years ago. Ecg today shows nsr with incomplete RBBB with  ms along first degree AVB at 258 ms, unchanged from a year ago.    Last echo in 2021 shows normal LVEF and valvular function.    Tiesha remains quite active without sob or cp or palpitations. Yesterday, she raked the leaves for 6 hours with 2- 15 minutes break. Her friends told her to slow down but I agree with Tiesha to remain active even more so as one ages. Continue with current meds and rtc to see harvey at [place] in a year with ecg and me in 2 years.   .             Review of Systems     12-pt ROS is negative except for as noted in the HPI.          Physical Exam     Vitals: LMP 05/01/2009   Wt Readings from Last 10 Encounters:   05/05/23 95.3 kg (210 lb)   12/15/22 92.5 kg (204 lb)   06/22/22 96.3 kg (212 lb 4.8 oz)   03/11/22 103.9 kg (229 lb)   08/11/21 110.2 kg (243 lb)   05/03/21 110.6  kg (243 lb 12.8 oz)   04/09/21 109.6 kg (241 lb 9.6 oz)   08/23/19 96.6 kg (213 lb)   05/08/19 96.8 kg (213 lb 4.8 oz)   10/04/18 105.7 kg (233 lb)       Constitutional:  Patient is pleasant, alert, cooperative, and in NAD.  HEENT:  NCAT. PERRLA. EOM's intact.   Neck:  CVP appears normal. No carotid bruits.   Pulmonary: Normal respiratory effort. CTAB.   Cardiac: RRR, normal S1/S2, no S3/S4, no murmur or rub.   Abdomen:  Non-tender abdomen, no hepatosplenomegaly appreciated.   Vascular: Pulses in the upper and lower extremities are 2+ and equal bilaterally.  Extremities: No edema, erythema, cyanosis or tenderness appreciated.  Skin:  No rashes or lesions appreciated.   Neurological:  No gross motor or sensory deficits.   Psych: Appropriate affect.          Data   Labs reviewed:  Recent Labs   Lab Test 05/05/23  1359 03/11/22  1154 04/09/21  1118 08/23/19  1250 06/13/18  0952 04/06/17  1254 03/10/17  1600   LDL 76 62 74   < > 114*   < >  --    HDL 41* 39* 43*   < > 42*   < >  --    NHDL 100 105 101   < > 145*   < >  --    CHOL 141 144 144   < > 187   < >  --    TRIG 118 216* 136   < > 154*   < >  --    TSH 4.09  --   --   --  3.27  --  2.28   NTBNP  --   --  93  --   --   --   --     < > = values in this interval not displayed.       Lab Results   Component Value Date    WBC 5.7 05/05/2023    WBC 6.7 05/05/2021    RBC 4.39 05/05/2023    RBC 4.41 05/05/2021    HGB 13.6 05/05/2023    HGB 13.6 05/05/2021    HCT 40.8 05/05/2023    HCT 40.6 05/05/2021    MCV 93 05/05/2023    MCV 92 05/05/2021    MCH 31.0 05/05/2023    MCH 30.8 05/05/2021    MCHC 33.3 05/05/2023    MCHC 33.5 05/05/2021    RDW 13.2 05/05/2023    RDW 12.4 05/05/2021     05/05/2023     05/05/2021       Lab Results   Component Value Date     05/05/2023     04/09/2021    POTASSIUM 4.2 05/05/2023    POTASSIUM 4.2 03/11/2022    POTASSIUM 4.5 04/09/2021    CHLORIDE 105 05/05/2023    CHLORIDE 109 03/11/2022    CHLORIDE 107 04/09/2021     "CO2 25 05/05/2023    CO2 29 03/11/2022    CO2 28 04/09/2021    ANIONGAP 9 05/05/2023    ANIONGAP 2 (L) 03/11/2022    ANIONGAP 4 04/09/2021    GLC 87 05/05/2023     (H) 03/11/2022    GLC 89 04/09/2021    BUN 14.4 05/05/2023    BUN 19 03/11/2022    BUN 17 04/09/2021    CR 0.74 05/05/2023    CR 0.82 04/09/2021    GFRESTIMATED 87 05/05/2023    GFRESTIMATED 73 04/09/2021    GFRESTBLACK 84 04/09/2021    CONSTANTIN 9.3 05/05/2023    CONSTANTIN 8.6 04/09/2021      Lab Results   Component Value Date    AST 19 05/05/2023    AST 11 04/09/2021    ALT 15 05/05/2023    ALT 26 04/09/2021       No results found for: \"A1C\"    Lab Results   Component Value Date    INR 0.99 10/12/2016            Problem List     Patient Active Problem List   Diagnosis    Mitral valve disorder    Anxiety    Major depressive disorder, recurrent episode, moderate (H)    Hyperlipidemia    Paroxysmal atrial fibrillation (H)    SOB (shortness of breath)    Pruritic disorder    Dermatographism    Morbid obesity (H)    Acromioclavicular joint arthritis    Polymorphic light eruption    Obstructive sleep apnea syndrome            Medications     Current Outpatient Medications   Medication Sig Dispense Refill    albuterol (VENTOLIN HFA) 108 (90 Base) MCG/ACT inhaler Inhale 2 puffs into the lungs every 4 hours as needed for shortness of breath or wheezing 18 g 1    bisacodyl (DULCOLAX) 5 MG EC tablet 2 days prior to procedure, take 2 tablets at 4 pm. 1 day prior to procedure, take 2 tablets at 4 pm. For additional instructions refer to your colonoscopy prep instructions. 4 tablet 0    cetirizine (ZYRTEC) 10 MG tablet Take 1 tablet (10 mg) by mouth daily as needed      escitalopram (LEXAPRO) 10 MG tablet Take 1 tablet (10 mg) by mouth daily 90 tablet 3    flecainide (TAMBOCOR) 150 MG tablet Take 1 tablet (150 mg) by mouth every 12 hours 180 tablet 3    polyethylene glycol (GOLYTELY) 236 g suspension 2 days prior at 5pm, mix and drink half of a jug of Golytely. Drink an 8 " oz. glass of Golytely every 15 minutes until half of the jug is gone. Place remainder of Golytely in the refrigerator. 1 day prior at 5 pm, drink the 2nd half of a jug of Golytely bowel prep. 6 hours before your check-in time, drink an 8 oz. glass of Golytely every 15 minutes until half of the 2nd jug of Golytely is gone. Discard remainder of second jug. 8000 mL 0    simvastatin (ZOCOR) 10 MG tablet Take 1 tablet (10 mg) by mouth daily 90 tablet 3    triamcinolone (KENALOG) 0.1 % external cream Apply topically 2 times daily 30 g 1            Past Medical History     Past Medical History:   Diagnosis Date    Adhesive capsulitis     Adhesive capsulitis of shoulder 07/30/2013    History of blood transfusion     Hyperlipemia     Mitral valve disorder     Mitral valve disorders(424.0)     Hx of mitral valve prolapse    OA (osteoarthritis) of hip     Osteoarthritis of acromioclavicular joint 10/17/2012    Paroxysmal atrial fibrillation (H) 07/01/2008    Paroxysmal supraventricular tachycardia (H)     Rotator cuff tear 10/17/2012    Tobacco abuse      Past Surgical History:   Procedure Laterality Date    CANALPLASTY Right 6/23/2016    Procedure: CANALPLASTY;  Surgeon: Rishabh Boudreaux MD;  [facility]    COLONOSCOPY  07/11/07    COLONOSCOPY N/A 8/1/2023    Procedure: Colonoscopy with Polypectomy;  Surgeon: Clif Cooney MD;  [facility]    CYSTOSCOPY N/A 8/20/2018    Procedure: CYSTOSCOPY;  cystoscopy, mid uretheral sling;  Surgeon: Kamari Sexton MD;  [facility]    DILATION AND CURETTAGE  2/18/16    HD&C    DILATION AND CURETTAGE, OPERATIVE HYSTEROSCOPY, COMBINED N/A 2/18/2016    Procedure: COMBINED DILATION AND CURETTAGE, OPERATIVE HYSTEROSCOPY;  Surgeon: Keshia Chang DO;  [facility]    GRAFT THIERSCH STATUS POST TYMPANOMASTOIDECTOMY Right 6/30/2016    Procedure: GRAFT THIERSCH STATUS POST TYMPANOMASTOIDECTOMY;  Surgeon: Rishabh Boudreaux MD;  [facility]    H ABLATION  FOCAL AFIB  10/12/16    HC LAPAROSCOPY, SURGICAL; APPENDECTOMY  08/27/2007    JOINT REPLACEMENT, HIP RT/LT Right 8/12/14    Joint Replacement Hip RT/LT    MEATOPLASTY EAR Right 6/23/2016    Procedure: MEATOPLASTY EAR;  Surgeon: Rishabh Boudreaux MD;  [facility]    MYRINGOTOMY Right 4/26/2016    Procedure: MYRINGOTOMY;  Surgeon: Evi Solorzano MD;  [facility]    SHOULDER SURGERY Left 1/7/15    torn bicep, arthroplasty, rotator cuff    SLING TRANSVAGINAL N/A 8/20/2018    Procedure: SLING TRANSVAGINAL;;  Surgeon: Kamari Sexton MD;  [facility]    TYMPANOMASTOIDECTOMY Right 6/23/2016    Procedure: TYMPANOMASTOIDECTOMY;  Surgeon: Rishabh Boudreaux MD;  [facility]    TYMPANOMASTOIDECTOMY WITH FACIAL MONITORING  12/13/2011    Procedure:TYMPANOMASTOIDECTOMY WITH FACIAL MONITORING; right tympanoplasty, mastoidectomy with facial nerve monitoring; Surgeon:EVI SOLORZANO R; [facility]    ZZC LAP,UTERUS,UNLISTED PROCEDURE  08/27/2007    Lyis of adhesions of the uterus to the abdominal wall.    ZZC TREAT ECTOPIC PREG,ABD PREG  1974    about 3 mos.     Family History   Problem Relation Age of Onset    Allergies Son         Hayfever    Heart Disease Son         Paroxysmal tach.    Arthritis Mother     Depression Mother         depression and anxiety    Respiratory Mother         Asthma    Arthritis Father     Heart Disease Father     Lipids Father     Arrhythmia Father     Pacemaker Father     Heart Surgery Father         bypass x3    Cancer Maternal Grandmother         Breast CA    Cancer Paternal Grandmother         ?? pancreatic CA    Osteoporosis Paternal Grandmother     Neurologic Disorder Daughter         ?? epilepsy    Obesity Daughter     Family History Negative Brother     Family History Negative Son     Family History Negative Brother     Diabetes Other         Maternal cousin --- juev.onset    EYE* Other         Niece-- lazy eye    Cancer Other     Heart Disease Paternal Uncle          death at 56/bypass surgery    Heart Disease Paternal Aunt      Social History     Socioeconomic History    Marital status:      Spouse name: Not on file    Number of children: 3    Years of education: Not on file    Highest education level: Not on file   Occupational History     Comment:    Tobacco Use    Smoking status: Every Day     Packs/day: 0.25     Years: 32.00     Additional pack years: 0.00     Total pack years: 8.00     Types: Cigarettes     Last attempt to quit: 2021     Years since quittin.8    Smokeless tobacco: Never    Tobacco comments:     1 cigarette every two weeks   Vaping Use    Vaping Use: Never used   Substance and Sexual Activity    Alcohol use: No    Drug use: No    Sexual activity: Yes     Partners: Male     Comment: no b/c   Other Topics Concern    Parent/sibling w/ CABG, MI or angioplasty before 65F 55M? No     Service Not Asked    Blood Transfusions Not Asked    Caffeine Concern No    Occupational Exposure No    Hobby Hazards Not Asked    Sleep Concern No    Stress Concern No    Weight Concern No    Special Diet Not Asked    Back Care Not Asked    Exercise No    Bike Helmet Not Asked    Seat Belt Yes    Self-Exams Not Asked   Social History Narrative    Not on file     Social Determinants of Health     Financial Resource Strain: Not on file   Food Insecurity: Not on file   Transportation Needs: Not on file   Physical Activity: Not on file   Stress: Not on file   Social Connections: Not on file   Interpersonal Safety: Not on file   Housing Stability: Not on file            Allergies   Oxycodone    Today's clinic visit entailed:  The following tests were independently interpreted by me as noted in my documentation: ecg, echo  30 minutes spent by me on the date of the encounter doing chart review, history and exam, documentation and further activities per the note  Provider  Link to [facility] Help Grid     The level of medical decision making during this visit  was of moderate complexity.

## 2023-11-01 NOTE — LETTER
11/1/2023    Jessy David MD  [address]  [place] [address]    RE: Tiesha Gurrola       Dear Colleague,     I had the pleasure of seeing Tiesha Gurrola in the [facility].    Electrophysiology Clinic Progress Note    Tiesha Gurrola MRN# [id number]   YOB: 1950 Age: 72 year old     Primary cardiologist:          Assessment and Plan   Thank you for allowing me to participate in care of this very delightful patient.  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  She underwent isolation of pulmonary veins and empiric ablation of the CTI at that time.     When I saw her last in 2019 I recommend her to taper off the flecainide to truly  the success of the ablation.  Tiesha did reduce the dose of flecainide from 150 the morning to 75 at night and was doing quite well for 2 months until she decided to take 75 mg twice a day.  Unfortunately, with a low dose of 75 mg twice daily  she started to have more palpitations and decided to go back to 150 mg in the morning and 75 mg at night which was increased to 150 mg bid. Despite her having palpitations no documentation of AF since ablation 7 years ago. Ecg today shows nsr with incomplete RBBB with  ms along first degree AVB at 258 ms, unchanged from a year ago.    Last echo in 2021 shows normal LVEF and valvular function.    Tiesha remains quite active without sob or cp or palpitations. Yesterday, she raked the leaves for 6 hours with 2- 15 minutes break. Her friends told her to slow down but I agree with Tiesha to remain active even more so as one ages. Continue with current meds and rtc to see harvey at [place] in a year with ecg and me in 2 years.   .             Review of Systems     12-pt ROS is negative except for as noted in the HPI.          Physical Exam     Vitals:  LMP 05/01/2009   Wt Readings from Last 10 Encounters:   05/05/23 95.3 kg (210 lb)   12/15/22 92.5 kg (204 lb)   06/22/22 96.3 kg (212 lb 4.8 oz)   03/11/22 103.9 kg (229 lb)   08/11/21 110.2 kg (243 lb)   05/03/21 110.6 kg (243 lb 12.8 oz)   04/09/21 109.6 kg (241 lb 9.6 oz)   08/23/19 96.6 kg (213 lb)   05/08/19 96.8 kg (213 lb 4.8 oz)   10/04/18 105.7 kg (233 lb)       Constitutional:  Patient is pleasant, alert, cooperative, and in NAD.  HEENT:  NCAT. PERRLA. EOM's intact.   Neck:  CVP appears normal. No carotid bruits.   Pulmonary: Normal respiratory effort. CTAB.   Cardiac: RRR, normal S1/S2, no S3/S4, no murmur or rub.   Abdomen:  Non-tender abdomen, no hepatosplenomegaly appreciated.   Vascular: Pulses in the upper and lower extremities are 2+ and equal bilaterally.  Extremities: No edema, erythema, cyanosis or tenderness appreciated.  Skin:  No rashes or lesions appreciated.   Neurological:  No gross motor or sensory deficits.   Psych: Appropriate affect.          Data   Labs reviewed:  Recent Labs   Lab Test 05/05/23  1359 03/11/22  1154 04/09/21  1118 08/23/19  1250 06/13/18  0952 04/06/17  1254 03/10/17  1600   LDL 76 62 74   < > 114*   < >  --    HDL 41* 39* 43*   < > 42*   < >  --    NHDL 100 105 101   < > 145*   < >  --    CHOL 141 144 144   < > 187   < >  --    TRIG 118 216* 136   < > 154*   < >  --    TSH 4.09  --   --   --  3.27  --  2.28   NTBNP  --   --  93  --   --   --   --     < > = values in this interval not displayed.       Lab Results   Component Value Date    WBC 5.7 05/05/2023    WBC 6.7 05/05/2021    RBC 4.39 05/05/2023    RBC 4.41 05/05/2021    HGB 13.6 05/05/2023    HGB 13.6 05/05/2021    HCT 40.8 05/05/2023    HCT 40.6 05/05/2021    MCV 93 05/05/2023    MCV 92 05/05/2021    MCH 31.0 05/05/2023    MCH 30.8 05/05/2021    MCHC 33.3 05/05/2023    MCHC 33.5 05/05/2021    RDW 13.2 05/05/2023    RDW 12.4 05/05/2021     05/05/2023     05/05/2021       Lab Results   Component  "Value Date     05/05/2023     04/09/2021    POTASSIUM 4.2 05/05/2023    POTASSIUM 4.2 03/11/2022    POTASSIUM 4.5 04/09/2021    CHLORIDE 105 05/05/2023    CHLORIDE 109 03/11/2022    CHLORIDE 107 04/09/2021    CO2 25 05/05/2023    CO2 29 03/11/2022    CO2 28 04/09/2021    ANIONGAP 9 05/05/2023    ANIONGAP 2 (L) 03/11/2022    ANIONGAP 4 04/09/2021    GLC 87 05/05/2023     (H) 03/11/2022    GLC 89 04/09/2021    BUN 14.4 05/05/2023    BUN 19 03/11/2022    BUN 17 04/09/2021    CR 0.74 05/05/2023    CR 0.82 04/09/2021    GFRESTIMATED 87 05/05/2023    GFRESTIMATED 73 04/09/2021    GFRESTBLACK 84 04/09/2021    CONSTANTIN 9.3 05/05/2023    CONSTANTIN 8.6 04/09/2021      Lab Results   Component Value Date    AST 19 05/05/2023    AST 11 04/09/2021    ALT 15 05/05/2023    ALT 26 04/09/2021       No results found for: \"A1C\"    Lab Results   Component Value Date    INR 0.99 10/12/2016            Problem List     Patient Active Problem List   Diagnosis    Mitral valve disorder    Anxiety    Major depressive disorder, recurrent episode, moderate (H)    Hyperlipidemia    Paroxysmal atrial fibrillation (H)    SOB (shortness of breath)    Pruritic disorder    Dermatographism    Morbid obesity (H)    Acromioclavicular joint arthritis    Polymorphic light eruption    Obstructive sleep apnea syndrome            Medications     Current Outpatient Medications   Medication Sig Dispense Refill    albuterol (VENTOLIN HFA) 108 (90 Base) MCG/ACT inhaler Inhale 2 puffs into the lungs every 4 hours as needed for shortness of breath or wheezing 18 g 1    bisacodyl (DULCOLAX) 5 MG EC tablet 2 days prior to procedure, take 2 tablets at 4 pm. 1 day prior to procedure, take 2 tablets at 4 pm. For additional instructions refer to your colonoscopy prep instructions. 4 tablet 0    cetirizine (ZYRTEC) 10 MG tablet Take 1 tablet (10 mg) by mouth daily as needed      escitalopram (LEXAPRO) 10 MG tablet Take 1 tablet (10 mg) by mouth daily 90 tablet 3 "    flecainide (TAMBOCOR) 150 MG tablet Take 1 tablet (150 mg) by mouth every 12 hours 180 tablet 3    polyethylene glycol (GOLYTELY) 236 g suspension 2 days prior at 5pm, mix and drink half of a jug of Golytely. Drink an 8 oz. glass of Golytely every 15 minutes until half of the jug is gone. Place remainder of Golytely in the refrigerator. 1 day prior at 5 pm, drink the 2nd half of a jug of Golytely bowel prep. 6 hours before your check-in time, drink an 8 oz. glass of Golytely every 15 minutes until half of the 2nd jug of Golytely is gone. Discard remainder of second jug. 8000 mL 0    simvastatin (ZOCOR) 10 MG tablet Take 1 tablet (10 mg) by mouth daily 90 tablet 3    triamcinolone (KENALOG) 0.1 % external cream Apply topically 2 times daily 30 g 1            Past Medical History     Past Medical History:   Diagnosis Date    Adhesive capsulitis     Adhesive capsulitis of shoulder 07/30/2013    History of blood transfusion     Hyperlipemia     Mitral valve disorder     Mitral valve disorders(424.0)     Hx of mitral valve prolapse    OA (osteoarthritis) of hip     Osteoarthritis of acromioclavicular joint 10/17/2012    Paroxysmal atrial fibrillation (H) 07/01/2008    Paroxysmal supraventricular tachycardia (H)     Rotator cuff tear 10/17/2012    Tobacco abuse      Past Surgical History:   Procedure Laterality Date    CANALPLASTY Right 6/23/2016    Procedure: CANALPLASTY;  Surgeon: Rishabh Boudreaux MD;  [facility]    COLONOSCOPY  07/11/07    COLONOSCOPY N/A 8/1/2023    Procedure: Colonoscopy with Polypectomy;  Surgeon: Clif Cooney MD;  [facility]    CYSTOSCOPY N/A 8/20/2018    Procedure: CYSTOSCOPY;  cystoscopy, mid uretheral sling;  Surgeon: Kamari Sexton MD;  [facility]    DILATION AND CURETTAGE  2/18/16    HD&C    DILATION AND CURETTAGE, OPERATIVE HYSTEROSCOPY, COMBINED N/A 2/18/2016    Procedure: COMBINED DILATION AND CURETTAGE, OPERATIVE HYSTEROSCOPY;  Surgeon: Keshia Chang  DO Judy;  [facility]: [facility]    GRAFT THIERSCH STATUS POST TYMPANOMASTOIDECTOMY Right 6/30/2016    Procedure: GRAFT THIERSCH STATUS POST TYMPANOMASTOIDECTOMY;  Surgeon: Rishabh Boudreaux MD;  [facility]    H ABLATION FOCAL AFIB  10/12/16    HC LAPAROSCOPY, SURGICAL; APPENDECTOMY  08/27/2007    JOINT REPLACEMENT, HIP RT/LT Right 8/12/14    Joint Replacement Hip RT/LT    MEATOPLASTY EAR Right 6/23/2016    Procedure: MEATOPLASTY EAR;  Surgeon: Rishabh Boudreaux MD;  [facility]    MYRINGOTOMY Right 4/26/2016    Procedure: MYRINGOTOMY;  Surgeon: Evi Solorzano MD;  [facility]    SHOULDER SURGERY Left 1/7/15    torn bicep, arthroplasty, rotator cuff    SLING TRANSVAGINAL N/A 8/20/2018    Procedure: SLING TRANSVAGINAL;;  Surgeon: Kamari Sexton MD;  [facility]    TYMPANOMASTOIDECTOMY Right 6/23/2016    Procedure: TYMPANOMASTOIDECTOMY;  Surgeon: Rishabh Boudreaux MD;  [facility]    TYMPANOMASTOIDECTOMY WITH FACIAL MONITORING  12/13/2011    Procedure:TYMPANOMASTOIDECTOMY WITH FACIAL MONITORING; right tympanoplasty, mastoidectomy with facial nerve monitoring; Surgeon:EVI SOLORZANO R; [facility]    ZZC LAP,UTERUS,UNLISTED PROCEDURE  08/27/2007    Lyis of adhesions of the uterus to the abdominal wall.    ZZC TREAT ECTOPIC PREG,ABD PREG  1974    about 3 mos.     Family History   Problem Relation Age of Onset    Allergies Son         Hayfever    Heart Disease Son         Paroxysmal tach.    Arthritis Mother     Depression Mother         depression and anxiety    Respiratory Mother         Asthma    Arthritis Father     Heart Disease Father     Lipids Father     Arrhythmia Father     Pacemaker Father     Heart Surgery Father         bypass x3    Cancer Maternal Grandmother         Breast CA    Cancer Paternal Grandmother         ?? pancreatic CA    Osteoporosis Paternal Grandmother     Neurologic Disorder Daughter         ?? epilepsy    Obesity Daughter     Family History Negative  Brother     Family History Negative Son     Family History Negative Brother     Diabetes Other         Maternal cousin --- juev.onset    EYE* Other         Niece-- lazy eye    Cancer Other     Heart Disease Paternal Uncle         death at 56/bypass surgery    Heart Disease Paternal Aunt      Social History     Socioeconomic History    Marital status:      Spouse name: Not on file    Number of children: 3    Years of education: Not on file    Highest education level: Not on file   Occupational History     Comment:    Tobacco Use    Smoking status: Every Day     Packs/day: 0.25     Years: 32.00     Additional pack years: 0.00     Total pack years: 8.00     Types: Cigarettes     Last attempt to quit: 2021     Years since quittin.8    Smokeless tobacco: Never    Tobacco comments:     1 cigarette every two weeks   Vaping Use    Vaping Use: Never used   Substance and Sexual Activity    Alcohol use: No    Drug use: No    Sexual activity: Yes     Partners: Male     Comment: no b/c   Other Topics Concern    Parent/sibling w/ CABG, MI or angioplasty before 65F 55M? No     Service Not Asked    Blood Transfusions Not Asked    Caffeine Concern No    Occupational Exposure No    Hobby Hazards Not Asked    Sleep Concern No    Stress Concern No    Weight Concern No    Special Diet Not Asked    Back Care Not Asked    Exercise No    Bike Helmet Not Asked    Seat Belt Yes    Self-Exams Not Asked   Social History Narrative    Not on file     Social Determinants of Health     Financial Resource Strain: Not on file   Food Insecurity: Not on file   Transportation Needs: Not on file   Physical Activity: Not on file   Stress: Not on file   Social Connections: Not on file   Interpersonal Safety: Not on file   Housing Stability: Not on file            Allergies   Oxycodone    Today's clinic visit entailed:  The following tests were independently interpreted by me as noted in my documentation: ecg, echo  30  minutes spent by me on the date of the encounter doing chart review, history and exam, documentation and further activities per the note  Provider  Link to [facility] Help Grid     The level of medical decision making during this visit was of moderate complexity.       Thank you for allowing me to participate in the care of your patient.      Sincerely,     Luis Aguilar MD     [facility]  cc:   NAVI Corbin CNP  [address]  [place] [id number]

## 2024-02-08 ENCOUNTER — NURSE TRIAGE (OUTPATIENT)
Dept: FAMILY MEDICINE | Facility: OTHER | Age: 74
End: 2024-02-08
Payer: MEDICARE

## 2024-02-08 NOTE — TELEPHONE ENCOUNTER
"Patient calling regarding pain/soreness on side of breast along rib cage. Patient notes that she felt a lump there that is painful to touch at 4/10. Denies any redness, fever, or difficulty breathing.     RN was able to schedule patient with PCP tomorrow at 3, but per protocol patient should be seen today.     Are you ok with patient waiting till tomorrow to see you, or do you advise patient be seen sooner in UC/ED?    RHONDA De Anda, RN     Reason for Disposition   Breast lump   Breast looks infected (spreading redness, feels hot or painful to touch) and no fever    Additional Information   Negative: Breast looks infected (spreading redness, feels hot or painful to touch) and fever   Negative: SEVERE breast pain and fever > 103 F  (39.4 C)   Negative: Patient sounds very sick or weak to the triager   Negative: Chest pain   Negative: Breastfeeding questions about baby   Negative: Breastfeeding questions about mother (breast symptoms or feeling sick)   Negative: Breastfeeding questions about mother's medicines and diet   Negative: Postpartum breast pain and swelling, not breastfeeding   Negative: Small spot, skin growth or mole   Negative: Painful rash and multiple small blisters grouped together (i.e., dermatomal distribution or \"band\" or \"stripe\")   Negative: Cuts, burns, or bruises of breasts and suspicious history for the injury    Protocols used: Breast Symptoms-A-OH    "

## 2024-02-09 ENCOUNTER — OFFICE VISIT (OUTPATIENT)
Dept: FAMILY MEDICINE | Facility: OTHER | Age: 74
End: 2024-02-09
Payer: MEDICARE

## 2024-02-09 VITALS
HEART RATE: 63 BPM | SYSTOLIC BLOOD PRESSURE: 138 MMHG | TEMPERATURE: 98.1 F | DIASTOLIC BLOOD PRESSURE: 74 MMHG | OXYGEN SATURATION: 97 %

## 2024-02-09 DIAGNOSIS — L02.93 CARBUNCLE: ICD-10-CM

## 2024-02-09 DIAGNOSIS — N63.23 MASS OF LOWER OUTER QUADRANT OF LEFT BREAST: ICD-10-CM

## 2024-02-09 DIAGNOSIS — N64.4 MASTODYNIA: Primary | ICD-10-CM

## 2024-02-09 PROCEDURE — 99213 OFFICE O/P EST LOW 20 MIN: CPT | Performed by: FAMILY MEDICINE

## 2024-02-09 RX ORDER — CEPHALEXIN 500 MG/1
500 CAPSULE ORAL 3 TIMES DAILY
Qty: 30 CAPSULE | Refills: 0 | Status: SHIPPED | OUTPATIENT
Start: 2024-02-09 | End: 2024-02-19

## 2024-02-09 RX ORDER — RESPIRATORY SYNCYTIAL VIRUS VACCINE 120MCG/0.5
0.5 KIT INTRAMUSCULAR ONCE
Qty: 1 EACH | Refills: 0 | Status: CANCELLED | OUTPATIENT
Start: 2024-02-09 | End: 2024-02-09

## 2024-02-09 ASSESSMENT — PAIN SCALES - GENERAL: PAINLEVEL: MILD PAIN (3)

## 2024-02-09 NOTE — PROGRESS NOTES
Assessment & Plan     Mastodynia/Mass of lower outer quadrant of left breast/Carbuncle  Patient has burning discomfort in her left lower breast and left lower chest wall.  No shingles appearing rash.  She does have this red area in her left lower outer breast concerning for possible boil.  It is not had a head.  There is some thickness underneath this which I suspect is related to the skin but could be related to the breast.  We discussed treating with antibiotics.  Will also obtain mammogram and ultrasound.    - MA Diagnostic Digital Bilateral; Future  - US Breast Left Limited 1-3 Quadrants; Future  - cephALEXin (KEFLEX) 500 MG capsule; Take 1 capsule (500 mg) by mouth 3 times daily for 10 days    Subjective   Tiesha is a 73 year old, presenting for the following health issues:  Breast Problem (Left sided tenderness)        2/9/2024     2:56 PM   Additional Questions   Roomed by jewell ashraf     History of Present Illness       Reason for visit:  Breast tenderness  Symptom onset:  More than a month  Symptoms include:  Soreness  Symptom intensity:  Moderate  Symptom progression:  Staying the same  Had these symptoms before:  No  What makes it worse:  Wearing a bra  What makes it better:  Sometimes no bra    She eats 2-3 servings of fruits and vegetables daily.She consumes 0 sweetened beverage(s) daily.She exercises with enough effort to increase her heart rate 20 to 29 minutes per day.  She exercises with enough effort to increase her heart rate 3 or less days per week.   She is taking medications regularly.     Patient states about a month ago she noticed some burning on the left side of her neck by her skull.  It was worse when she touches this area.  She then felt that the burning went by her ear and down her neck.  There is no mass or rash that she ever saw.  This then resolved on its own.    Several weeks later she noticed some burning in her left lower breast and left chest wall.  No rash was seen.  No  injury.  She then was feeling around on her breast and thought she may have felt a lump.  She is concerned as she does have breast cancer in her the family and her mother and maternal grandmother as well as 2 paternal cousins.  She denies any nipple discharge.  She noticed that she has discomfort wearing the bra on that left side.  Last mammogram in 2021.      Objective    /74 (BP Location: Right arm, Patient Position: Sitting, Cuff Size: Adult Large)   Pulse 63   Temp 98.1  F (36.7  C) (Temporal)   LMP 05/01/2009   SpO2 97%   There is no height or weight on file to calculate BMI.  Physical Exam   Gen: no apparent distress  Breasts: Large pendulous breasts.  When she lifts up her left breast to show me where she is having the burning there is a red skin lesion in the lower outer quadrant.  Patient was unaware that this existed.  There is some mild warmth.  It is not at a head.  There does seem to be some mass underneath this which I suspect is related to the lesion but difficult to tell if it is part of the breast.  No other breast masses were found on either breast.  No axillary adenopathy.  No tenderness over the chest wall.  Abdomen: No tenderness to palpation.  No masses.          Signed Electronically by: Jessy David MD

## 2024-03-06 ENCOUNTER — HOSPITAL ENCOUNTER (OUTPATIENT)
Dept: MAMMOGRAPHY | Facility: CLINIC | Age: 74
Discharge: HOME | End: 2024-03-06
Attending: FAMILY MEDICINE
Payer: MEDICARE

## 2024-03-06 DIAGNOSIS — Z12.31 VISIT FOR SCREENING MAMMOGRAM: ICD-10-CM

## 2024-03-06 DIAGNOSIS — N63.23 MASS OF LOWER OUTER QUADRANT OF LEFT BREAST: ICD-10-CM

## 2024-03-06 DIAGNOSIS — N64.4 MASTODYNIA: ICD-10-CM

## 2024-03-06 PROCEDURE — 77063 BREAST TOMOSYNTHESIS BI: CPT

## 2024-05-19 DIAGNOSIS — E78.5 HYPERLIPIDEMIA, UNSPECIFIED HYPERLIPIDEMIA TYPE: ICD-10-CM

## 2024-05-20 RX ORDER — SIMVASTATIN 10 MG
10 TABLET ORAL DAILY
Qty: 90 TABLET | Refills: 0 | Status: SHIPPED | OUTPATIENT
Start: 2024-05-20 | End: 2024-08-22

## 2024-05-20 NOTE — TELEPHONE ENCOUNTER
Attempted to reach Tiesha today for Rx refill request/Schedule lab visit. No answer. Unable to leave message: mailbox is full.   Will postpone to attempt on later date.

## 2024-07-06 DIAGNOSIS — F33.1 MAJOR DEPRESSIVE DISORDER, RECURRENT EPISODE, MODERATE (H): ICD-10-CM

## 2024-07-08 RX ORDER — ESCITALOPRAM OXALATE 10 MG/1
10 TABLET ORAL DAILY
Qty: 90 TABLET | Refills: 0 | Status: SHIPPED | OUTPATIENT
Start: 2024-07-08 | End: 2024-10-03

## 2024-08-22 DIAGNOSIS — E78.5 HYPERLIPIDEMIA, UNSPECIFIED HYPERLIPIDEMIA TYPE: ICD-10-CM

## 2024-08-22 RX ORDER — SIMVASTATIN 10 MG
10 TABLET ORAL DAILY
Qty: 90 TABLET | Refills: 0 | Status: SHIPPED | OUTPATIENT
Start: 2024-08-22

## 2024-09-03 DIAGNOSIS — I48.0 PAROXYSMAL ATRIAL FIBRILLATION (H): ICD-10-CM

## 2024-09-03 RX ORDER — FLECAINIDE ACETATE 150 MG/1
150 TABLET ORAL EVERY 12 HOURS
Qty: 180 TABLET | Refills: 0 | Status: SHIPPED | OUTPATIENT
Start: 2024-09-03

## 2024-09-03 NOTE — TELEPHONE ENCOUNTER
REFILL REQUEST     Last Written Prescription Date:  8/15/23  Last Fill Quantity: 180,  # refills: 3   Last office visit with prescribing provider: 11/1/2023    Future Office Visit:  None scheduled, patient to follow-up in one year with MARYAN Santos RN   [facility]

## 2024-09-12 NOTE — TELEPHONE ENCOUNTER
Third attempt to reach patient.   September 12, 2024  Maria Antonia Fox   to [facility]  Me   NT    9/12/24  9:31 AM  Attempted to contact patient, no answer, voicemail full unable to leave a message

## 2024-09-16 NOTE — TELEPHONE ENCOUNTER
Fourth call attempt and letter will be sent.     Maria Antonia Fox   to  [facility]       9/16/24 10:23 AM  9/16/24 Attempted to contact patient, no answer, voicemail full unable to leave a message

## 2024-10-03 DIAGNOSIS — F33.1 MAJOR DEPRESSIVE DISORDER, RECURRENT EPISODE, MODERATE (H): ICD-10-CM

## 2024-10-03 RX ORDER — ESCITALOPRAM OXALATE 10 MG/1
10 TABLET ORAL DAILY
Qty: 90 TABLET | Refills: 0 | Status: SHIPPED | OUTPATIENT
Start: 2024-10-03 | End: 2024-10-08

## 2024-10-08 ENCOUNTER — OFFICE VISIT (OUTPATIENT)
Dept: FAMILY MEDICINE | Facility: OTHER | Age: 74
End: 2024-10-08
Payer: MEDICARE

## 2024-10-08 VITALS
RESPIRATION RATE: 18 BRPM | HEART RATE: 59 BPM | OXYGEN SATURATION: 98 % | WEIGHT: 211 LBS | TEMPERATURE: 97 F | HEIGHT: 62 IN | BODY MASS INDEX: 38.83 KG/M2 | SYSTOLIC BLOOD PRESSURE: 134 MMHG | DIASTOLIC BLOOD PRESSURE: 80 MMHG

## 2024-10-08 DIAGNOSIS — I48.0 PAROXYSMAL ATRIAL FIBRILLATION (H): ICD-10-CM

## 2024-10-08 DIAGNOSIS — R31.0 GROSS HEMATURIA: ICD-10-CM

## 2024-10-08 DIAGNOSIS — N20.1 CALCULUS OF URETER: ICD-10-CM

## 2024-10-08 DIAGNOSIS — K86.2 CYST OF PANCREAS: ICD-10-CM

## 2024-10-08 DIAGNOSIS — R10.9 FLANK PAIN: ICD-10-CM

## 2024-10-08 DIAGNOSIS — F33.1 MAJOR DEPRESSIVE DISORDER, RECURRENT EPISODE, MODERATE (H): ICD-10-CM

## 2024-10-08 DIAGNOSIS — Z00.00 ENCOUNTER FOR MEDICARE ANNUAL WELLNESS EXAM: Primary | ICD-10-CM

## 2024-10-08 DIAGNOSIS — E66.01 SEVERE OBESITY (BMI 35.0-39.9) WITH COMORBIDITY (H): ICD-10-CM

## 2024-10-08 DIAGNOSIS — E78.5 HYPERLIPIDEMIA, UNSPECIFIED HYPERLIPIDEMIA TYPE: ICD-10-CM

## 2024-10-08 LAB
ALBUMIN SERPL BCG-MCNC: 4.1 G/DL (ref 3.5–5.2)
ALBUMIN UR-MCNC: NEGATIVE MG/DL
ALP SERPL-CCNC: 66 U/L (ref 40–150)
ALT SERPL W P-5'-P-CCNC: 12 U/L (ref 0–50)
ANION GAP SERPL CALCULATED.3IONS-SCNC: 12 MMOL/L (ref 7–15)
APPEARANCE UR: CLEAR
AST SERPL W P-5'-P-CCNC: 15 U/L (ref 0–45)
BILIRUB SERPL-MCNC: 0.4 MG/DL
BILIRUB UR QL STRIP: NEGATIVE
BUN SERPL-MCNC: 15.8 MG/DL (ref 8–23)
CALCIUM SERPL-MCNC: 9.1 MG/DL (ref 8.8–10.4)
CHLORIDE SERPL-SCNC: 105 MMOL/L (ref 98–107)
CHOLEST SERPL-MCNC: 161 MG/DL
CK SERPL-CCNC: 46 U/L (ref 26–192)
COLOR UR AUTO: YELLOW
CREAT SERPL-MCNC: 0.84 MG/DL (ref 0.51–0.95)
EGFRCR SERPLBLD CKD-EPI 2021: 74 ML/MIN/1.73M2
ERYTHROCYTE [DISTWIDTH] IN BLOOD BY AUTOMATED COUNT: 12.4 % (ref 10–15)
FASTING STATUS PATIENT QL REPORTED: YES
FASTING STATUS PATIENT QL REPORTED: YES
GLUCOSE SERPL-MCNC: 87 MG/DL (ref 70–99)
GLUCOSE UR STRIP-MCNC: NEGATIVE MG/DL
HCO3 SERPL-SCNC: 23 MMOL/L (ref 22–29)
HCT VFR BLD AUTO: 39.8 % (ref 35–47)
HDLC SERPL-MCNC: 40 MG/DL
HGB BLD-MCNC: 13.7 G/DL (ref 11.7–15.7)
HGB UR QL STRIP: NEGATIVE
KETONES UR STRIP-MCNC: NEGATIVE MG/DL
LDLC SERPL CALC-MCNC: 88 MG/DL
LEUKOCYTE ESTERASE UR QL STRIP: NEGATIVE
MCH RBC QN AUTO: 31.5 PG (ref 26.5–33)
MCHC RBC AUTO-ENTMCNC: 34.4 G/DL (ref 31.5–36.5)
MCV RBC AUTO: 92 FL (ref 78–100)
NITRATE UR QL: NEGATIVE
NONHDLC SERPL-MCNC: 121 MG/DL
PH UR STRIP: 7 [PH] (ref 5–7)
PLATELET # BLD AUTO: 187 10E3/UL (ref 150–450)
POTASSIUM SERPL-SCNC: 4.3 MMOL/L (ref 3.4–5.3)
PROT SERPL-MCNC: 7.1 G/DL (ref 6.4–8.3)
RBC # BLD AUTO: 4.35 10E6/UL (ref 3.8–5.2)
SODIUM SERPL-SCNC: 140 MMOL/L (ref 135–145)
SP GR UR STRIP: 1.02 (ref 1–1.03)
TRIGL SERPL-MCNC: 163 MG/DL
UROBILINOGEN UR STRIP-ACNC: 1 E.U./DL
WBC # BLD AUTO: 6.1 10E3/UL (ref 4–11)

## 2024-10-08 PROCEDURE — 82550 ASSAY OF CK (CPK): CPT | Performed by: FAMILY MEDICINE

## 2024-10-08 PROCEDURE — 99214 OFFICE O/P EST MOD 30 MIN: CPT | Mod: 25 | Performed by: FAMILY MEDICINE

## 2024-10-08 PROCEDURE — 80061 LIPID PANEL: CPT | Performed by: FAMILY MEDICINE

## 2024-10-08 PROCEDURE — 85027 COMPLETE CBC AUTOMATED: CPT | Performed by: FAMILY MEDICINE

## 2024-10-08 PROCEDURE — 36415 COLL VENOUS BLD VENIPUNCTURE: CPT | Performed by: FAMILY MEDICINE

## 2024-10-08 PROCEDURE — G0439 PPPS, SUBSEQ VISIT: HCPCS | Performed by: FAMILY MEDICINE

## 2024-10-08 PROCEDURE — 80053 COMPREHEN METABOLIC PANEL: CPT | Performed by: FAMILY MEDICINE

## 2024-10-08 PROCEDURE — 81003 URINALYSIS AUTO W/O SCOPE: CPT | Performed by: FAMILY MEDICINE

## 2024-10-08 RX ORDER — SIMVASTATIN 10 MG
10 TABLET ORAL DAILY
Qty: 90 TABLET | Refills: 3 | Status: SHIPPED | OUTPATIENT
Start: 2024-10-08

## 2024-10-08 RX ORDER — BIOTIN 1 MG
1000 TABLET ORAL DAILY
COMMUNITY

## 2024-10-08 RX ORDER — ESCITALOPRAM OXALATE 10 MG/1
10 TABLET ORAL DAILY
Qty: 90 TABLET | Refills: 3 | Status: SHIPPED | OUTPATIENT
Start: 2024-10-08

## 2024-10-08 SDOH — HEALTH STABILITY: PHYSICAL HEALTH: ON AVERAGE, HOW MANY MINUTES DO YOU ENGAGE IN EXERCISE AT THIS LEVEL?: 120 MIN

## 2024-10-08 SDOH — HEALTH STABILITY: PHYSICAL HEALTH: ON AVERAGE, HOW MANY DAYS PER WEEK DO YOU ENGAGE IN MODERATE TO STRENUOUS EXERCISE (LIKE A BRISK WALK)?: 4 DAYS

## 2024-10-08 ASSESSMENT — PATIENT HEALTH QUESTIONNAIRE - PHQ9
SUM OF ALL RESPONSES TO PHQ QUESTIONS 1-9: 5
10. IF YOU CHECKED OFF ANY PROBLEMS, HOW DIFFICULT HAVE THESE PROBLEMS MADE IT FOR YOU TO DO YOUR WORK, TAKE CARE OF THINGS AT HOME, OR GET ALONG WITH OTHER PEOPLE: NOT DIFFICULT AT ALL
SUM OF ALL RESPONSES TO PHQ QUESTIONS 1-9: 5

## 2024-10-08 ASSESSMENT — SOCIAL DETERMINANTS OF HEALTH (SDOH): HOW OFTEN DO YOU GET TOGETHER WITH FRIENDS OR RELATIVES?: ONCE A WEEK

## 2024-10-08 NOTE — PROGRESS NOTES
Preventive Care Visit  [facility]  Jessy David MD, Family Medicine  Oct 8, 2024      Assessment & Plan     Encounter for Medicare annual wellness exam      Hyperlipidemia, unspecified hyperlipidemia type  She continues on simvastatin.  She has had intermittent rust colored urine.  Will check a CK.  Will check her other labs.  Refills given.    - simvastatin (ZOCOR) 10 MG tablet; Take 1 tablet (10 mg) by mouth daily.  - Lipid panel reflex to direct LDL Non-fasting  - Comprehensive metabolic panel (BMP + Alb, Alk Phos, ALT, AST, Total. Bili, TP)  - CK total    Flank pain/Gross hematuria  She has had a couple episodes of rust colored urine after a long day of working outside.  Discussed this could be related to some dehydration.  Following this she had some episodes of flank pain that started in her back and wrapped around her flanks.  She then a few days ago had gross hematuria that is now resolved.  Her urinalysis today is normal.  I am concerned about a possible kidney stone.  Will obtain a CT.  If this is unremarkable would then refer her to urology for gross hematuria.    - CT Abdomen Pelvis w/o Contrast; Future  - UA Macroscopic with reflex to Microscopic and Culture - Lab Collect    Paroxysmal atrial fibrillation (H)  Follows with cardiology.  Rhythm controlled with flecainide.    - CBC with platelets    Major depressive disorder, recurrent episode, moderate (H)  Stable on Lexapro.  Refills given.    - escitalopram (LEXAPRO) 10 MG tablet; Take 1 tablet (10 mg) by mouth daily.    Severe obesity (BMI 35.0-39.9) with comorbidity (H)  Comorbidities include hyperlipidemia.  Weight loss recommended.      Nicotine/Tobacco Cessation  She reports that she has been smoking cigarettes. She started smoking about 35 years ago. She has a 8 pack-year smoking history. She has never used smokeless tobacco.  Nicotine/Tobacco Cessation Plan  Information offered: Patient not interested at this  "time      BMI  Estimated body mass index is 38.59 kg/m  as calculated from the following:    Height as of this encounter: 1.575 m (5' 2\").    Weight as of this encounter: 95.7 kg (211 lb).   Weight management plan: Discussed healthy diet and exercise guidelines    Counseling  Appropriate preventive services were addressed with this patient via screening, questionnaire, or discussion as appropriate for fall prevention, nutrition, physical activity, Tobacco-use cessation, social engagement, weight loss and cognition.  Checklist reviewing preventive services available has been given to the patient.  Reviewed patient's diet, addressing concerns and/or questions.   She is at risk for psychosocial distress and has been provided with information to reduce risk.   The patient was provided with written information regarding signs of hearing loss.   Information on urinary incontinence and treatment options given to patient.   The patient's PHQ-9 score is consistent with mild depression. She was provided with information regarding depression.         Barron Motley is a 73 year old, presenting for the following:  Medicare Visit and UTI        10/8/2024     1:23 PM   Additional Questions   Roomed by jewell ashraf         Health Care Directive  Patient does not have a Health Care Directive or Living Will: Discussed advance care planning with patient; information given to patient to review.    UTI      Genitourinary - Female  Onset/Duration: 6-7 weeks  Description:   Painful urination (Dysuria): No           Frequency: No  Blood in urine (Hematuria): YES  Delay in urine (Hesitency): No  Intensity: mild  Progression of Symptoms:  waxing and waning  Accompanying Signs & Symptoms:  Fever/chills: No  Flank pain: YES  Nausea and vomiting: No  Vaginal symptoms: none  Abdominal/Pelvic Pain: YES  History:   History of frequent UTI s: No  History of kidney stones: No  Sexually Active: YES  Possibility of pregnancy: No  Precipitating or " alleviating factors: None  Therapies tried and outcome:     She states about 6 weeks ago she was working in her garden all day and when she came back in the house her urine looked rust colored.  It was better the next day.  She denied having any pain with urination.  Then a couple of weeks later she had the same thing where after she was working outside she came and noticed rust colored urine.  After that resolved she then noticed a pain on the right side of her back that wraps around to her right upper quadrant and she describes this as burning.  She then noticed the same thing on her left side.  Her right side resolved on its own within a day and the left side resolved about a day later.  She then states she was doing well up until a couple days ago where she noticed she had bright red blood in her urine.  That then resolved after a day and then she was straining for a bowel movement and when she was wiping she noticed a little blood which she thinks was from the vagina.  She has not noticed any more of that since then.  She denies fevers.  She denies unusual weight loss.  She is concerned about her colon and her pancreas.         10/8/2024   General Health   How would you rate your overall physical health? (!) FAIR   Feel stress (tense, anxious, or unable to sleep) Only a little      (!) STRESS CONCERN      10/8/2024   Nutrition   Diet: Regular (no restrictions)            10/8/2024   Exercise   Days per week of moderate/strenous exercise 4 days   Average minutes spent exercising at this level 120 min            10/8/2024   Social Factors   Frequency of gathering with friends or relatives Once a week   Worry food won't last until get money to buy more No   Food not last or not have enough money for food? No   Do you have housing? (Housing is defined as stable permanent housing and does not include staying ouside in a car, in a tent, in an abandoned building, in an overnight shelter, or couch-surfing.) Yes   Are  you worried about losing your housing? No   Lack of transportation? No   Unable to get utilities (heat,electricity)? No            10/8/2024   Fall Risk   Fallen 2 or more times in the past year? No   Trouble with walking or balance? No             10/8/2024   Activities of Daily Living- Home Safety   Needs help with the following daily activites None of the above   Safety concerns in the home None of the above            10/8/2024   Dental   Dentist two times every year? Yes            10/8/2024   Hearing Screening   Hearing concerns? (!) I NEED TO ASK PEOPLE TO SPEAK UP OR REPEAT THEMSELVES.    (!) IT'S HARD TO FOLLOW A CONVERSATION IN A NOISY RESTAURANT OR CROWDED ROOM.    (!) TROUBLE UNDERSTANDING SOFT OR WHISPERED SPEECH.    (!) TROUBLE UNDERSTANDING SPEECH ON THE TELEPHONE       Multiple values from one day are sorted in reverse-chronological order         10/8/2024   Driving Risk Screening   Patient/family members have concerns about driving No            10/8/2024   General Alertness/Fatigue Screening   Have you been more tired than usual lately? No            10/8/2024   Urinary Incontinence Screening   Bothered by leaking urine in past 6 months Yes            10/8/2024   TB Screening   Were you born outside of the [place]? No          Today's PHQ-9 Score:       10/8/2024     1:13 PM   PHQ-9 SCORE   PHQ-9 Total Score MyChart 5 (Mild depression)   PHQ-9 Total Score 5         10/8/2024   Substance Use   Alcohol more than 3/day or more than 7/wk Not Applicable   Do you have a current opioid prescription? No   How severe/bad is pain from 1 to 10? 2/10   Do you use any other substances recreationally? No        Social History     Tobacco Use    Smoking status: Every Day     Current packs/day: 0.00     Average packs/day: 0.3 packs/day for 32.0 years (8.0 ttl pk-yrs)     Types: Cigarettes     Start date: 1/4/1989     Last attempt to quit: 1/4/2021     Years since quitting: 3.7    Smokeless tobacco: Never    Tobacco  comments:     1 cigarette every two weeks   Vaping Use    Vaping status: Never Used   Substance Use Topics    Alcohol use: No    Drug use: No           3/6/2024   LAST FHS-7 RESULTS   1st degree relative breast or ovarian cancer No   Any relative bilateral breast cancer No   Any male have breast cancer No   Any ONE woman have BOTH breast AND ovarian cancer No   Any woman with breast cancer before 50yrs No   2 or more relatives with breast AND/OR ovarian cancer No   2 or more relatives with breast AND/OR bowel cancer No           Mammogram Screening - Mammogram every 1-2 years updated in Health Maintenance based on mutual decision making    ASCVD Risk   The 10-year ASCVD risk score (Philippe WERNER, et al., 2019) is: 17.1%    Values used to calculate the score:      Age: 73 years      Sex: Female      Is Non- : No      Diabetic: No      Tobacco smoker: Yes      Systolic Blood Pressure: 134 mmHg      Is BP treated: No      HDL Cholesterol: 41 mg/dL      Total Cholesterol: 141 mg/dL          Reviewed and updated as needed this visit by Provider   Tobacco  Allergies  Meds  Problems  Med Hx  Surg Hx  Fam Hx              Current providers sharing in care for this patient include:  Patient Care Team:  Jessy David MD as PCP - General  Jessy David MD as Assigned PCP  Jake Solorzano MD as MD (Otolaryngology)  Luis Vega MD as Assigned Heart and Vascular Provider    The following health maintenance items are reviewed in Epic and correct as of today:  Health Maintenance   Topic Date Due    ZOSTER IMMUNIZATION (1 of 2) Never done    RSV VACCINE (1 - Risk 60-74 years 1-dose series) Never done    NICOTINE/TOBACCO CESSATION COUNSELING Q 1 YR  05/05/2024    MEDICARE ANNUAL WELLNESS VISIT  05/05/2024    CMP  05/05/2024    LIPID  05/05/2024    INFLUENZA VACCINE (1) 09/01/2024    COVID-19 Vaccine (1 - 2024-25 season) Never done    PHQ-9  04/08/2025    ANNUAL REVIEW OF   "ORDERS  05/20/2025    DTAP/TDAP/TD IMMUNIZATION (2 - Td or Tdap) 06/22/2025    FALL RISK ASSESSMENT  10/08/2025    MAMMO SCREENING  03/06/2026    GLUCOSE  05/05/2026    ADVANCE CARE PLANNING  05/05/2028    COLORECTAL CANCER SCREENING  08/01/2028    DEXA  06/18/2033    HEPATITIS C SCREENING  Completed    DEPRESSION ACTION PLAN  Completed    Pneumococcal Vaccine: 65+ Years  Completed    HPV IMMUNIZATION  Aged Out    MENINGITIS IMMUNIZATION  Aged Out    RSV MONOCLONAL ANTIBODY  Aged Out    LUNG CANCER SCREENING  Discontinued          Objective    Exam  /80 (BP Location: Left arm, Patient Position: Sitting, Cuff Size: Adult Large)   Pulse 59   Temp 97  F (36.1  C) (Temporal)   Resp 18   Ht 1.575 m (5' 2\")   Wt 95.7 kg (211 lb)   LMP 05/01/2009   SpO2 98%   BMI 38.59 kg/m     Estimated body mass index is 38.59 kg/m  as calculated from the following:    Height as of this encounter: 1.575 m (5' 2\").    Weight as of this encounter: 95.7 kg (211 lb).    Physical Exam  Constitutional:       General: She is not in acute distress.     Appearance: She is well-developed.   HENT:      Right Ear: Tympanic membrane and external ear normal.      Left Ear: Tympanic membrane and external ear normal.      Nose: Nose normal.   Eyes:      General:         Right eye: No discharge.         Left eye: No discharge.      Conjunctiva/sclera: Conjunctivae normal.   Neck:      Thyroid: No thyroid mass.   Cardiovascular:      Rate and Rhythm: Normal rate and regular rhythm.      Heart sounds: Normal heart sounds, S1 normal and S2 normal. No murmur heard.  Pulmonary:      Effort: Pulmonary effort is normal. No respiratory distress.      Breath sounds: Normal breath sounds. No wheezing or rales.   Abdominal:      General: Bowel sounds are normal.      Palpations: Abdomen is soft. There is no mass.      Tenderness: There is no abdominal tenderness.   Musculoskeletal:         General: Normal range of motion.      Cervical back: Neck " supple.   Lymphadenopathy:      Cervical: No cervical adenopathy.   Skin:     General: Skin is warm and dry.      Findings: No rash.   Neurological:      Mental Status: She is alert and oriented to person, place, and time.   Psychiatric:         Mood and Affect: Mood normal.         Behavior: Behavior normal.         Thought Content: Thought content normal.         Judgment: Judgment normal.       Results for orders placed or performed in visit on 10/08/24   CBC with platelets     Status: Normal   Result Value Ref Range    WBC Count 6.1 4.0 - 11.0 10e3/uL    RBC Count 4.35 3.80 - 5.20 10e6/uL    Hemoglobin 13.7 11.7 - 15.7 g/dL    Hematocrit 39.8 35.0 - 47.0 %    MCV 92 78 - 100 fL    MCH 31.5 26.5 - 33.0 pg    MCHC 34.4 31.5 - 36.5 g/dL    RDW 12.4 10.0 - 15.0 %    Platelet Count 187 150 - 450 10e3/uL   UA Macroscopic with reflex to Microscopic and Culture - Lab Collect     Status: Normal    Specimen: Urine, NOS   Result Value Ref Range    Color Urine Yellow Colorless, Straw, Light Yellow, Yellow    Appearance Urine Clear Clear    Glucose Urine Negative Negative mg/dL    Bilirubin Urine Negative Negative    Ketones Urine Negative Negative mg/dL    Specific Gravity Urine 1.020 1.003 - 1.035    Blood Urine Negative Negative    pH Urine 7.0 5.0 - 7.0    Protein Albumin Urine Negative Negative mg/dL    Urobilinogen Urine 1.0 0.2, 1.0 E.U./dL    Nitrite Urine Negative Negative    Leukocyte Esterase Urine Negative Negative    Narrative    Microscopic not indicated            10/8/2024   Mini Cog   Clock Draw Score 2 Normal   3 Item Recall 3 objects recalled   Mini Cog Total Score 5                 Signed Electronically by: Jessy David MD    Answers submitted by the patient for this visit:  Patient Health Questionnaire (Submitted on 10/8/2024)  If you checked off any problems, how difficult have these problems made it for you to do your work, take care of things at home, or get along with other people?: Not  difficult at all  PHQ9 TOTAL SCORE: 5

## 2024-10-08 NOTE — LETTER
October 9, 2024      Tiesha Gurrola  [id number] [facility] [id number]        Dear ,    We are writing to inform you of your test results.    Your test results fall within the expected range(s) or remain unchanged from previous results.  Please continue with current treatment plan.    Resulted Orders   Lipid panel reflex to direct LDL Non-fasting   Result Value Ref Range    Cholesterol 161 <200 mg/dL    Triglycerides 163 (H) <150 mg/dL    Direct Measure HDL 40 (L) >=50 mg/dL    LDL Cholesterol Calculated 88 <100 mg/dL    Non HDL Cholesterol 121 <130 mg/dL    Patient Fasting > 8hrs? Yes     Narrative    Cholesterol  Desirable: < 200 mg/dL  Borderline High: 200 - 239 mg/dL  High: >= 240 mg/dL    Triglycerides  Normal: < 150 mg/dL  Borderline High: 150 - 199 mg/dL  High: 200-499 mg/dL  Very High: >= 500 mg/dL    Direct Measure HDL  Female: >= 50 mg/dL   Male: >= 40 mg/dL    LDL Cholesterol  Desirable: < 100 mg/dL  Above Desirable: 100 - 129 mg/dL   Borderline High: 130 - 159 mg/dL   High:  160 - 189 mg/dL   Very High: >= 190 mg/dL    Non HDL Cholesterol  Desirable: < 130 mg/dL  Above Desirable: 130 - 159 mg/dL  Borderline High: 160 - 189 mg/dL  High: 190 - 219 mg/dL  Very High: >= 220 mg/dL   Comprehensive metabolic panel (BMP + Alb, Alk Phos, ALT, AST, Total. Bili, TP)   Result Value Ref Range    Sodium 140 135 - 145 mmol/L    Potassium 4.3 3.4 - 5.3 mmol/L    Carbon Dioxide (CO2) 23 22 - 29 mmol/L    Anion Gap 12 7 - 15 mmol/L    Urea Nitrogen 15.8 8.0 - 23.0 mg/dL    Creatinine 0.84 0.51 - 0.95 mg/dL    GFR Estimate 74 >60 mL/min/1.73m2      Comment:      eGFR calculated using 2021 CKD-EPI equation.    Calcium 9.1 8.8 - 10.4 mg/dL      Comment:      Reference intervals for this test were updated on 7/16/2024 to reflect our healthy population more accurately. There may be differences in the flagging of prior results with similar values performed with this method. Those prior results can be  interpreted in the context of the updated reference intervals.    Chloride 105 98 - 107 mmol/L    Glucose 87 70 - 99 mg/dL    Alkaline Phosphatase 66 40 - 150 U/L    AST 15 0 - 45 U/L    ALT 12 0 - 50 U/L    Protein Total 7.1 6.4 - 8.3 g/dL    Albumin 4.1 3.5 - 5.2 g/dL    Bilirubin Total 0.4 <=1.2 mg/dL    Patient Fasting > 8hrs? Yes    CBC with platelets   Result Value Ref Range    WBC Count 6.1 4.0 - 11.0 10e3/uL    RBC Count 4.35 3.80 - 5.20 10e6/uL    Hemoglobin 13.7 11.7 - 15.7 g/dL    Hematocrit 39.8 35.0 - 47.0 %    MCV 92 78 - 100 fL    MCH 31.5 26.5 - 33.0 pg    MCHC 34.4 31.5 - 36.5 g/dL    RDW 12.4 10.0 - 15.0 %    Platelet Count 187 150 - 450 10e3/uL   UA Macroscopic with reflex to Microscopic and Culture - Lab Collect   Result Value Ref Range    Color Urine Yellow Colorless, Straw, Light Yellow, Yellow    Appearance Urine Clear Clear    Glucose Urine Negative Negative mg/dL    Bilirubin Urine Negative Negative    Ketones Urine Negative Negative mg/dL    Specific Gravity Urine 1.020 1.003 - 1.035    Blood Urine Negative Negative    pH Urine 7.0 5.0 - 7.0    Protein Albumin Urine Negative Negative mg/dL    Urobilinogen Urine 1.0 0.2, 1.0 E.U./dL    Nitrite Urine Negative Negative    Leukocyte Esterase Urine Negative Negative    Narrative    Microscopic not indicated   CK total   Result Value Ref Range    CK 46 26 - 192 U/L       If you have any questions or concerns, please call the clinic at the number listed above.       Sincerely,      Jessy David MD

## 2024-10-08 NOTE — PATIENT INSTRUCTIONS
Patient Education   Preventive Care Advice   This is general advice given by our system to help you stay healthy. However, your care team may have specific advice just for you. Please talk to your care team about your preventive care needs.  Nutrition  Eat 5 or more servings of fruits and vegetables each day.  Try wheat bread, brown rice and whole grain pasta (instead of white bread, rice, and pasta).  Get enough calcium and vitamin D. Check the label on foods and aim for 100% of the RDA (recommended daily allowance).  Lifestyle  Exercise at least 150 minutes each week  (30 minutes a day, 5 days a week).  Do muscle strengthening activities 2 days a week. These help control your weight and prevent disease.  No smoking.  Wear sunscreen to prevent skin cancer.  Have a dental exam and cleaning every 6 months.  Yearly exams  See your health care team every year to talk about:  Any changes in your health.  Any medicines your care team has prescribed.  Preventive care, family planning, and ways to prevent chronic diseases.  Shots (vaccines)   HPV shots (up to age 26), if you've never had them before.  Hepatitis B shots (up to age 59), if you've never had them before.  COVID-19 shot: Get this shot when it's due.  Flu shot: Get a flu shot every year.  Tetanus shot: Get a tetanus shot every 10 years.  Pneumococcal, hepatitis A, and RSV shots: Ask your care team if you need these based on your risk.  Shingles shot (for age 50 and up)  General health tests  Diabetes screening:  Starting at age 35, Get screened for diabetes at least every 3 years.  If you are younger than age 35, ask your care team if you should be screened for diabetes.  Cholesterol test: At age 39, start having a cholesterol test every 5 years, or more often if advised.  Bone density scan (DEXA): At age 50, ask your care team if you should have this scan for osteoporosis (brittle bones).  Hepatitis C: Get tested at least once in your life.  STIs (sexually  transmitted infections)  Before age 24: Ask your care team if you should be screened for STIs.  After age 24: Get screened for STIs if you're at risk. You are at risk for STIs (including HIV) if:  You are sexually active with more than one person.  You don't use condoms every time.  You or a partner was diagnosed with a sexually transmitted infection.  If you are at risk for HIV, ask about PrEP medicine to prevent HIV.  Get tested for HIV at least once in your life, whether you are at risk for HIV or not.  Cancer screening tests  Cervical cancer screening: If you have a cervix, begin getting regular cervical cancer screening tests starting at age 21.  Breast cancer scan (mammogram): If you've ever had breasts, begin having regular mammograms starting at age 40. This is a scan to check for breast cancer.  Colon cancer screening: It is important to start screening for colon cancer at age 45.  Have a colonoscopy test every 10 years (or more often if you're at risk) Or, ask your provider about stool tests like a FIT test every year or Cologuard test every 3 years.  To learn more about your testing options, visit:   .  For help making a decision, visit:   https://bit.ly/ll16184.  Prostate cancer screening test: If you have a prostate, ask your care team if a prostate cancer screening test (PSA) at age 55 is right for you.  Lung cancer screening: If you are a current or former smoker ages 50 to 80, ask your care team if ongoing lung cancer screenings are right for you.  For informational purposes only. Not to replace the advice of your health care provider. Copyright   2023 [facility] [employer]. All rights reserved. Clinically reviewed by the [facility]itions [facility]. [employer] 111824 - REV 01/24.  Hearing Loss: Care Instructions  Overview     Hearing loss is a sudden or slow decrease in how well you hear. It can range from slight to profound. Permanent hearing loss can occur with aging. It also can  happen when you are exposed long-term to loud noise. Examples include listening to loud music, riding motorcycles, or being around other loud machines.  Hearing loss can affect your work and home life. It can make you feel lonely or depressed. You may feel that you have lost your independence. But hearing aids and other devices can help you hear better and feel connected to others.  Follow-up care is a key part of your treatment and safety. Be sure to make and go to all appointments, and call your doctor if you are having problems. It's also a good idea to know your test results and keep a list of the medicines you take.  How can you care for yourself at home?  Avoid loud noises whenever possible. This helps keep your hearing from getting worse.  Always wear hearing protection around loud noises.  Wear a hearing aid as directed.  A professional can help you pick a hearing aid that will work best for you.  You can also get hearing aids over the counter for mild to moderate hearing loss.  Have hearing tests as your doctor suggests. They can show whether your hearing has changed. Your hearing aid may need to be adjusted.  Use other devices as needed. These may include:  Telephone amplifiers and hearing aids that can connect to a television, stereo, radio, or microphone.  Devices that use lights or vibrations. These alert you to the doorbell, a ringing telephone, or a baby monitor.  Television closed-captioning. This shows the words at the bottom of the screen. Most new TVs can do this.  TTY (text telephone). This lets you type messages back and forth on the telephone instead of talking or listening. These devices are also called TDD. When messages are typed on the keyboard, they are sent over the phone line to a receiving TTY. The message is shown on a monitor.  Use text messaging, social media, and email if it is hard for you to communicate by telephone.  Try to learn a listening technique called speechreading. It is  "not lipreading. You pay attention to people's gestures, expressions, posture, and tone of voice. These clues can help you understand what a person is saying. Face the person you are talking to, and have them face you. Make sure the lighting is good. You need to see the other person's face clearly.  Think about counseling if you need help to adjust to your hearing loss.  When should you call for help?  Watch closely for changes in your health, and be sure to contact your doctor if:    You think your hearing is getting worse.     You have new symptoms, such as dizziness or nausea.   Where can you learn more?  Go to https://www.[employer].net/patiented  Enter R798 in the search box to learn more about \"Hearing Loss: Care Instructions.\"  Current as of: September 27, 2023  Content Version: 14.2 2024 [employer].   Care instructions adapted under license by your healthcare professional. If you have questions about a medical condition or this instruction, always ask your healthcare professional. [employer] disclaims any warranty or liability for your use of this information.    Bladder Training: Care Instructions  Your Care Instructions     Bladder training is used to treat urge incontinence and stress incontinence. Urge incontinence means that the need to urinate comes on so fast that you can't get to a toilet in time. Stress incontinence means that you leak urine because of pressure on your bladder. For example, it may happen when you laugh, cough, or lift something heavy.  Bladder training can increase how long you can wait before you have to urinate. It can also help your bladder hold more urine. And it can give you better control over the urge to urinate.  It is important to remember that bladder training takes a few weeks to a few months to make a difference. You may not see results right away, but don't give up.  Follow-up care is a key part of your treatment and safety. Be sure to make " and go to all appointments, and call your doctor if you are having problems. It's also a good idea to know your test results and keep a list of the medicines you take.  How can you care for yourself at home?  Work with your doctor to come up with a bladder training program that is right for you. You may use one or more of the following methods.  Delayed urination  In the beginning, try to keep from urinating for 5 minutes after you first feel the need to go.  While you wait, take deep, slow breaths to relax. Kegel exercises can also help you delay the need to go to the bathroom.  After some practice, when you can easily wait 5 minutes to urinate, try to wait 10 minutes before you urinate.  Slowly increase the waiting period until you are able to control when you have to urinate.  Scheduled urination  Empty your bladder when you first wake up in the morning.  Schedule times throughout the day when you will urinate.  Start by going to the bathroom every hour, even if you don't need to go.  Slowly increase the time between trips to the bathroom.  When you have found a schedule that works well for you, keep doing it.  If you wake up during the night and have to urinate, do it. Apply your schedule to waking hours only.  Kegel exercises  These tighten and strengthen pelvic muscles, which can help you control the flow of urine. (If doing these exercises causes pain, stop doing them and talk with your doctor.) To do Kegel exercises:  Squeeze your muscles as if you were trying not to pass gas. Or squeeze your muscles as if you were stopping the flow of urine. Your belly, legs, and buttocks shouldn't move.  Hold the squeeze for 3 seconds, then relax for 5 to 10 seconds.  Start with 3 seconds, then add 1 second each week until you are able to squeeze for 10 seconds.  Repeat the exercise 10 times a session. Do 3 to 8 sessions a day.  When should you call for help?  Watch closely for changes in your health, and be sure to  "contact your doctor if:    Your incontinence is getting worse.     You do not get better as expected.   Where can you learn more?  Go to https://www.[employer].net/patiented  Enter V684 in the search box to learn more about \"Bladder Training: Care Instructions.\"  Current as of: November 15, 2023  Content Version: 14.2 2024 [employer].   Care instructions adapted under license by your healthcare professional. If you have questions about a medical condition or this instruction, always ask your healthcare professional. Healthwise[employer] Incorporated disclaims any warranty or liability for your use of this information.    Learning About Depression Screening  What is depression screening?  Depression screening is a way to see if you have depression symptoms. It may be done by a doctor or counselor. It's often part of a routine checkup. That's because your mental health is just as important as your physical health.  Depression is a mental health condition that affects how you feel, think, and act. You may:  Have less energy.  Lose interest in your daily activities.  Feel sad and grouchy for a long time.  Depression is very common. It affects people of all ages.  Many things can lead to depression. Some people become depressed after they have a stroke or find out they have a major illness like cancer or heart disease. The death of a loved one or a breakup may lead to depression. It can run in families. Most experts believe that a combination of inherited genes and stressful life events can cause it.  What happens during screening?  You may be asked to fill out a form about your depression symptoms. You and the doctor will discuss your answers. The doctor may ask you more questions to learn more about how you think, act, and feel.  What happens after screening?  If you have symptoms of depression, your doctor will talk to you about your options.  Doctors usually treat depression with medicines or counseling. Often, " "combining the two works best. Many people don't get help because they think that they'll get over the depression on their own. But people with depression may not get better unless they get treatment.  The cause of depression is not well understood. There may be many factors involved. But if you have depression, it's not your fault.  A serious symptom of depression is thinking about death or suicide. If you or someone you care about talks about this or about feeling hopeless, get help right away.  It's important to know that depression can be treated. Medicine, counseling, and self-care may help.  Where can you learn more?  Go to https://www.[employer].net/patiented  Enter T185 in the search box to learn more about \"Learning About Depression Screening.\"  Current as of: June 24, 2023  Content Version: 14.2 2024 [facility] [employer].   Care instructions adapted under license by your healthcare professional. If you have questions about a medical condition or this instruction, always ask your healthcare professional. Healthwise[employer] Incorporated disclaims any warranty or liability for your use of this information.       "

## 2024-10-18 ENCOUNTER — HOSPITAL ENCOUNTER (OUTPATIENT)
Dept: CT IMAGING | Facility: CLINIC | Age: 74
Discharge: HOME | End: 2024-10-18
Attending: FAMILY MEDICINE | Admitting: FAMILY MEDICINE
Payer: MEDICARE

## 2024-10-18 DIAGNOSIS — R10.9 FLANK PAIN: ICD-10-CM

## 2024-10-18 PROCEDURE — G1010 CDSM STANSON: HCPCS

## 2024-10-18 PROCEDURE — 74176 CT ABD & PELVIS W/O CONTRAST: CPT | Mod: MG

## 2024-10-22 ENCOUNTER — TELEPHONE (OUTPATIENT)
Dept: FAMILY MEDICINE | Facility: OTHER | Age: 74
End: 2024-10-22
Payer: MEDICARE

## 2024-10-22 NOTE — TELEPHONE ENCOUNTER
RN called and spoke to patient about her results. I let her know Urology would reach out to her once the referral went in.    RN confirmed patient is not claustrophobic.

## 2024-10-22 NOTE — TELEPHONE ENCOUNTER
----- Message from Jessy David sent at 10/22/2024  8:38 AM CDT -----  She has a stone on the left, this is causing the intermittent blood.  It is most likely too big to pass and will refer to Urology    She also has a cyst in her pancreas that we need to check out further.  She will need an MRI--is she claustrophobic?

## 2024-11-05 ENCOUNTER — HOSPITAL ENCOUNTER (OUTPATIENT)
Dept: MRI IMAGING | Facility: CLINIC | Age: 74
Discharge: HOME | End: 2024-11-05
Attending: FAMILY MEDICINE | Admitting: FAMILY MEDICINE
Payer: MEDICARE

## 2024-11-05 DIAGNOSIS — K86.2 CYST OF PANCREAS: ICD-10-CM

## 2024-11-05 PROCEDURE — G1010 CDSM STANSON: HCPCS

## 2024-11-05 PROCEDURE — 255N000002 HC RX 255 OP 636: Performed by: FAMILY MEDICINE

## 2024-11-05 PROCEDURE — A9585 GADOBUTROL INJECTION: HCPCS | Performed by: FAMILY MEDICINE

## 2024-11-05 RX ORDER — GADOBUTROL 604.72 MG/ML
9.5 INJECTION INTRAVENOUS ONCE
Status: COMPLETED | OUTPATIENT
Start: 2024-11-05 | End: 2024-11-05

## 2024-11-05 RX ADMIN — GADOBUTROL 9.5 ML: 604.72 INJECTION INTRAVENOUS at 13:06

## 2024-11-06 ENCOUNTER — TELEPHONE (OUTPATIENT)
Dept: FAMILY MEDICINE | Facility: OTHER | Age: 74
End: 2024-11-06
Payer: MEDICARE

## 2024-11-06 DIAGNOSIS — K86.2 CYST OF PANCREAS: Primary | ICD-10-CM

## 2024-11-06 DIAGNOSIS — N20.1 CALCULUS OF URETER: ICD-10-CM

## 2024-11-06 NOTE — RESULT ENCOUNTER NOTE
Your usual provider is currently out of the clinic.    The MRI confirms a cyst in your pancreas.  It does not appear to have any worrisome findings associated with this.  Current guidelines recommend monitoring this every 6 months for 2 years to ensure no change.  You can have your regular provider order this test.    Have a nice day!    Dr. Garcia

## 2024-11-06 NOTE — TELEPHONE ENCOUNTER
Attempt #1 to call.     RN has attempted to contact this patient by phone to return their call, but there is no response. RN unable to leave a voicemail as mailbox is full.     BOYD De AndaN, RN

## 2024-11-06 NOTE — TELEPHONE ENCOUNTER
----- Message from Juan R Garcia MD sent at 11/6/2024 11:04 AM CST -----  Your usual provider is currently out of the clinic.    The MRI confirms a cyst in your pancreas.  It does not appear to have any worrisome findings associated with this.  Current guidelines recommend monitoring this every 6 months for 2 years to ensure no change.  You can have your regular provider order this test.    Have a nice day!    Dr. Garcia

## 2024-11-07 NOTE — TELEPHONE ENCOUNTER
Patient Contact    Attempt # 2    Was call answered?  No.  Unable to leave message. Mailbox is full.    Bianca NIXN, RN

## 2024-11-08 NOTE — TELEPHONE ENCOUNTER
Patient called and discussed MRI results as below.     The MRI confirms a cyst in your pancreas. It does not appear to have any worrisome findings associated with this. Current guidelines recommend monitoring this every 6 months for 2 years to ensure no change. You can have your regular provider order this test.     Patient verbalized understanding and would like PCP to place order for the follow up in 6 months.     Patient also reports she was unable to get scheduled with Urology, she said they called her and they had no appointments available. Referral was placed on 10/22/24. Patient advised to call Urology again and ask for an appointment for her kidney stone.    Routing to provider.    Bekah Cardona RN on 11/8/2024 at 9:32 AM

## 2024-11-14 ENCOUNTER — TELEPHONE (OUTPATIENT)
Dept: RHEUMATOLOGY | Facility: CLINIC | Age: 74
End: 2024-11-14
Payer: MEDICARE

## 2024-11-14 NOTE — LETTER
November 18, 2024      Tiesha Gurrola  [address] [address]        Dear Tiesha,         We, at [facility] want to help you receive better care.  We have been unable to reach you via phone and  are asking that you schedule an appointment with one of our urologists. You can call the clinic at [phone] to schedule an appointment.      Sincerely,      [facility]

## 2024-11-14 NOTE — TELEPHONE ENCOUNTER
Called pt to schedule appt for 1x0.6cm stone seen on CT. Vm full. Called x2 hoping pt would , no answer. Called  and left vm.    RHONDA Carmona RN 11/14/2024 9:06 AM

## 2024-11-18 NOTE — CONFIDENTIAL NOTE
Called pt to schedule appt for 1x0.6cm stone seen on CT. Vm full.   UNABLE TO LEAVE .   Pt is not active on my chart.  [facility]  Mayuri FRANK RN   [facility], Urology   11/18/2024 9:00 AM

## 2024-12-03 DIAGNOSIS — I48.0 PAROXYSMAL ATRIAL FIBRILLATION (H): ICD-10-CM

## 2024-12-03 RX ORDER — FLECAINIDE ACETATE 150 MG/1
150 TABLET ORAL EVERY 12 HOURS
Qty: 180 TABLET | Refills: 0 | Status: SHIPPED | OUTPATIENT
Start: 2024-12-03

## 2024-12-09 DIAGNOSIS — I48.0 PAROXYSMAL ATRIAL FIBRILLATION (H): ICD-10-CM

## 2024-12-09 RX ORDER — FLECAINIDE ACETATE 150 MG/1
150 TABLET ORAL EVERY 12 HOURS
Qty: 180 TABLET | Refills: 0 | Status: SHIPPED | OUTPATIENT
Start: 2024-12-09

## 2024-12-09 NOTE — TELEPHONE ENCOUNTER
Last Written Prescription Date:  12/3/24  Last Fill Quantity: 180,  # refills: 0   Last office visit: LOV 11/1/23  Future Office Visit:  Per MONA is to follow up in 1 year with MARYAN, pt. Not yet scheduled.     Sydney Borrego on 12/9/2024 at 7:56 AM

## 2024-12-11 ENCOUNTER — ANCILLARY PROCEDURE (OUTPATIENT)
Dept: GENERAL RADIOLOGY | Facility: CLINIC | Age: 74
End: 2024-12-11
Attending: UROLOGY
Payer: MEDICARE

## 2024-12-11 ENCOUNTER — OFFICE VISIT (OUTPATIENT)
Dept: UROLOGY | Facility: CLINIC | Age: 74
End: 2024-12-11
Attending: FAMILY MEDICINE
Payer: MEDICARE

## 2024-12-11 ENCOUNTER — PREP FOR PROCEDURE (OUTPATIENT)
Dept: UROLOGY | Facility: CLINIC | Age: 74
End: 2024-12-11

## 2024-12-11 ENCOUNTER — TELEPHONE (OUTPATIENT)
Dept: UROLOGY | Facility: CLINIC | Age: 74
End: 2024-12-11

## 2024-12-11 VITALS
SYSTOLIC BLOOD PRESSURE: 124 MMHG | HEIGHT: 62 IN | WEIGHT: 211 LBS | DIASTOLIC BLOOD PRESSURE: 68 MMHG | BODY MASS INDEX: 38.83 KG/M2 | TEMPERATURE: 96.9 F

## 2024-12-11 DIAGNOSIS — R31.0 GROSS HEMATURIA: ICD-10-CM

## 2024-12-11 DIAGNOSIS — N20.1 CALCULUS OF URETER: Primary | ICD-10-CM

## 2024-12-11 DIAGNOSIS — R10.9 FLANK PAIN: ICD-10-CM

## 2024-12-11 DIAGNOSIS — N20.1 CALCULUS OF URETER: ICD-10-CM

## 2024-12-11 DIAGNOSIS — N20.0 KIDNEY STONE: Primary | ICD-10-CM

## 2024-12-11 PROCEDURE — 52000 CYSTOURETHROSCOPY: CPT | Performed by: UROLOGY

## 2024-12-11 PROCEDURE — 99204 OFFICE O/P NEW MOD 45 MIN: CPT | Mod: 25 | Performed by: UROLOGY

## 2024-12-11 PROCEDURE — 74018 RADEX ABDOMEN 1 VIEW: CPT | Mod: TC | Performed by: RADIOLOGY

## 2024-12-11 RX ORDER — HYDROCODONE BITARTRATE AND ACETAMINOPHEN 5; 325 MG/1; MG/1
1 TABLET ORAL EVERY 6 HOURS PRN
Qty: 18 TABLET | Refills: 0 | Status: SHIPPED | OUTPATIENT
Start: 2024-12-11 | End: 2024-12-14

## 2024-12-11 ASSESSMENT — PAIN SCALES - GENERAL: PAINLEVEL_OUTOF10: MODERATE PAIN (4)

## 2024-12-11 NOTE — PROGRESS NOTES
S: Patient is a pleasant 74-year-old  female who was requested to be seen by Dr. Islas for a consultation with regard patient's recent history of gross hematuria along with renal stone found on CAT scan.  Patient complain of intermittent gross hematuria for the last several weeks.  More recently she has noticed increasing left flank pain.  She has no fever nausea or vomiting.  She has no history of kidney stones in the past.  Recent CT scan showed a 1 cm stone in the left UPJ.    Current Outpatient Medications   Medication Sig Dispense Refill    albuterol (VENTOLIN HFA) 108 (90 Base) MCG/ACT inhaler Inhale 2 puffs into the lungs every 4 hours as needed for shortness of breath or wheezing 18 g 1    biotin 1000 MCG TABS tablet Take 1,000 mcg by mouth daily.      cetirizine (ZYRTEC) 10 MG tablet Take 10 mg by mouth daily      escitalopram (LEXAPRO) 10 MG tablet Take 1 tablet (10 mg) by mouth daily. 90 tablet 3    flecainide (TAMBOCOR) 150 MG tablet Take 1 tablet (150 mg) by mouth every 12 hours. 180 tablet 0    HYDROcodone-acetaminophen (NORCO) 5-325 MG tablet Take 1 tablet by mouth every 6 hours as needed for pain. 18 tablet 0    simvastatin (ZOCOR) 10 MG tablet Take 1 tablet (10 mg) by mouth daily. 90 tablet 3    triamcinolone (KENALOG) 0.1 % external cream Apply topically 2 times daily 30 g 1     Allergies   Allergen Reactions    Oxycodone Nausea and Vomiting     Past Medical History:   Diagnosis Date    Adhesive capsulitis     Adhesive capsulitis of shoulder 07/30/2013    History of blood transfusion     Hyperlipemia     Mitral valve disorder     Mitral valve disorders(424.0)     Hx of mitral valve prolapse    OA (osteoarthritis) of hip     Osteoarthritis of acromioclavicular joint 10/17/2012    Paroxysmal atrial fibrillation (H) 07/01/2008    Paroxysmal supraventricular tachycardia (H)     Rotator cuff tear 10/17/2012    Tobacco abuse      Past Surgical History:   Procedure Laterality Date     CANALPLASTY Right 6/23/2016    Procedure: CANALPLASTY;  Surgeon: Rishabh Boudreaux MD;  [facility]    COLONOSCOPY  07/11/07    COLONOSCOPY N/A 8/1/2023    Procedure: Colonoscopy with Polypectomy;  Surgeon: Clif Cooney MD;  [facility]    CYSTOSCOPY N/A 8/20/2018    Procedure: CYSTOSCOPY;  cystoscopy, mid uretheral sling;  Surgeon: Kamari Sexton MD;  [facility]    DILATION AND CURETTAGE  2/18/16    HD&C    DILATION AND CURETTAGE, OPERATIVE HYSTEROSCOPY, COMBINED N/A 2/18/2016    Procedure: COMBINED DILATION AND CURETTAGE, OPERATIVE HYSTEROSCOPY;  Surgeon: Keshia Chang DO;  Location[facility]    GRAFT THIERSCH STATUS POST TYMPANOMASTOIDECTOMY Right 6/30/2016    Procedure: GRAFT THIERSCH STATUS POST TYMPANOMASTOIDECTOMY;  Surgeon: Rishabh Boudreaux MD;  [facility]    H ABLATION FOCAL AFIB  10/12/16    HC LAPAROSCOPY, SURGICAL; APPENDECTOMY  08/27/2007    JOINT REPLACEMENT, HIP RT/LT Right 8/12/14    Joint Replacement Hip RT/LT    MEATOPLASTY EAR Right 6/23/2016    Procedure: MEATOPLASTY EAR;  Surgeon: Rishabh Boudreaux MD;  [facility]    MYRINGOTOMY Right 4/26/2016    Procedure: MYRINGOTOMY;  Surgeon: Evi Solorzano MD;  [facility]    SHOULDER SURGERY Left 1/7/15    torn bicep, arthroplasty, rotator cuff    SLING TRANSVAGINAL N/A 8/20/2018    Procedure: SLING TRANSVAGINAL;;  Surgeon: Kamari Sexton MD;  [facility]    TYMPANOMASTOIDECTOMY Right 6/23/2016    Procedure: TYMPANOMASTOIDECTOMY;  Surgeon: Rishabh Boudreaux MD;  [facility]    TYMPANOMASTOIDECTOMY WITH FACIAL MONITORING  12/13/2011    Procedure:TYMPANOMASTOIDECTOMY WITH FACIAL MONITORING; right tympanoplasty, mastoidectomy with facial nerve monitoring; Surgeon:EVI SOLORZANO R; [facility]    ZZC LAP,UTERUS,UNLISTED PROCEDURE  08/27/2007    Lyis of adhesions of the uterus to the abdominal wall.    ZZC TREAT ECTOPIC PREG,ABD PREG  1974    about 3 mos.      Family History   Problem  Relation Age of Onset    Breast Cancer Mother     Arthritis Mother     Depression Mother         depression and anxiety    Respiratory Mother         Asthma    Arthritis Father     Heart Disease Father     Lipids Father     Arrhythmia Father     Pacemaker Father     Heart Surgery Father         bypass x3    Family History Negative Brother     Family History Negative Brother     Breast Cancer Maternal Grandmother     Cancer Maternal Grandmother         Breast CA    Cancer Paternal Grandmother         ?? pancreatic CA    Osteoporosis Paternal Grandmother     Neurologic Disorder Daughter         ?? epilepsy    Obesity Daughter     Allergies Son         Hayfever    Heart Disease Son         Paroxysmal tach.    Family History Negative Son     Heart Disease Paternal Aunt     Heart Disease Paternal Uncle         death at 56/bypass surgery    Diabetes Other         Maternal cousin --- juev.onset    EYE* Other         Niece-- lazy eye    Cancer Other      Social History     Socioeconomic History    Marital status:      Spouse name: None    Number of children: 3    Years of education: None    Highest education level: None   Occupational History     Comment:    Tobacco Use    Smoking status: Every Day     Current packs/day: 0.00     Average packs/day: 0.3 packs/day for 32.0 years (8.0 ttl pk-yrs)     Types: Cigarettes     Start date: 1/4/1989     Last attempt to quit: 1/4/2021     Years since quitting: 3.9    Smokeless tobacco: Never    Tobacco comments:     1 cigarette every two weeks   Vaping Use    Vaping status: Never Used   Substance and Sexual Activity    Alcohol use: No    Drug use: No    Sexual activity: Yes     Partners: Male     Comment: no b/c   Other Topics Concern    Parent/sibling w/ CABG, MI or angioplasty before 65F 55M? No    Caffeine Concern No    Occupational Exposure No    Sleep Concern No    Stress Concern No    Weight Concern No    Exercise No    Seat Belt Yes     Social Drivers of  Health     Financial Resource Strain: Low Risk  (10/8/2024)    Financial Resource Strain     Within the past 12 months, have you or your family members you live with been unable to get utilities (heat, electricity) when it was really needed?: No   Food Insecurity: Low Risk  (10/8/2024)    Food Insecurity     Within the past 12 months, did you worry that your food would run out before you got money to buy more?: No     Within the past 12 months, did the food you bought just not last and you didn t have money to get more?: No   Transportation Needs: Low Risk  (10/8/2024)    Transportation Needs     Within the past 12 months, has lack of transportation kept you from medical appointments, getting your medicines, non-medical meetings or appointments, work, or from getting things that you need?: No   Physical Activity: Sufficiently Active (10/8/2024)    Exercise Vital Sign     Days of Exercise per Week: 4 days     Minutes of Exercise per Session: 120 min   Stress: No Stress Concern Present (10/8/2024)    [employer] - Occupational Stress Questionnaire     Feeling of Stress : Only a little   Social Connections: Unknown (10/8/2024)    Social Connection and Isolation Panel [NHANES]     Frequency of Social Gatherings with Friends and Family: Once a week   Housing Stability: Low Risk  (10/8/2024)    Housing Stability     Do you have housing? : Yes     Are you worried about losing your housing?: No       REVIEW OF SYSTEMS  =================  C: NEGATIVE for fever, chills, change in weight  I: NEGATIVE for worrisome rashes, moles or lesions  E/M: NEGATIVE for ear, mouth and throat problems  R: NEGATIVE for significant cough or SHORTNESS OF BREATH  CV:  NEGATIVE for chest pain, palpitations or peripheral edema  GI: NEGATIVE for nausea, abdominal pain, heartburn, or change in bowel habits  NEURO: NEGATIVE numbness/weakness  : see HPI  PSYCH: NEGATIVE depression/anxiety  LYmph: no new enlarged lymph  "nodes  Ortho: no new trauma/movements      Physical Exam:  /68 (BP Location: Right arm, Patient Position: Sitting, Cuff Size: Adult Regular)   Temp 96.9  F (36.1  C) (Temporal)   Ht 1.575 m (5' 2\")   Wt 95.7 kg (211 lb)   LMP 05/01/2009   BMI 38.59 kg/m     Patient is pleasant, in no acute distress, good general condition.  Heart:  negative, PMI normal  Lung: no evidence of respiratory distress    Abdomen: Soft, nondistended, non tender. No masses. No rebound or guarding.   Exam: No CVA tenderness.  Normal urethra.  No vaginal prolapse.  Skin: Warm and dry.  No redness.  Neuro: grossly normal  Musculaskeletal: moving all extremities  Psych normal mood and affect  Musculoskeletal  moving all extremities  Hematologic/Lymphatic/Immunologic: normal ant/post cervical, axillary, supraclavicular and inguinal nodes     Cystoscopy performed today due to patient's history of smoking and gross hematuria.     Patient is prepped and draped.  Flexible cystoscope placed under direct vision.      Cysto: The anterior urethra is normal     In the bladder there is normal mucosa.    Assessment/Plan:  (N20.1) Calculus of ureter  (primary encounter diagnosis)  Comment:    Plan: XR Abdomen 1 View [[id number]] -calcified stone seen in the left kidney.           Treatment options discussed with patient today.  We discussed observation versus surgical intervention.  Patient is interested in having procedure done.  We discussed ESWL versus ureteroscopy.  Will schedule patient for elective ESWL.  Risks and benefit discussed.  Success rate of this procedure is about 80%.              (R31.0) Gross hematuria  Comment:    Plan: CYSTOURETHROSCOPY (18263)           Negative cystoscopy.  No bladder pathology.    (R10.9) Flank pain  Comment:    Plan: HYDROcodone-acetaminophen (NORCO) 5-325 MG         tablet                      "

## 2024-12-11 NOTE — TELEPHONE ENCOUNTER
Type of surgery: LITHOTRIPSY, EXTRACORPOREAL SHOCK WAVE WITH CYSTOSCOPY AND URETERAL STENT INSERTION (Left)   Location of surgery: [facility]  Date and time of surgery: 12-19-24  Surgeon: Tiffani  Pre-Op Appt Date:   Post-Op Appt Date:    Packet sent out: Yes  Pre-cert/Authorization completed:    Date:

## 2024-12-16 ENCOUNTER — ANESTHESIA EVENT (OUTPATIENT)
Dept: SURGERY | Facility: AMBULATORY SURGERY CENTER | Age: 74
End: 2024-12-16
Payer: MEDICARE

## 2024-12-17 ENCOUNTER — OFFICE VISIT (OUTPATIENT)
Dept: FAMILY MEDICINE | Facility: OTHER | Age: 74
End: 2024-12-17
Payer: MEDICARE

## 2024-12-17 VITALS
WEIGHT: 212 LBS | HEART RATE: 62 BPM | TEMPERATURE: 96.7 F | RESPIRATION RATE: 18 BRPM | HEIGHT: 62 IN | SYSTOLIC BLOOD PRESSURE: 112 MMHG | DIASTOLIC BLOOD PRESSURE: 62 MMHG | OXYGEN SATURATION: 97 % | BODY MASS INDEX: 39.01 KG/M2

## 2024-12-17 DIAGNOSIS — I48.0 PAROXYSMAL ATRIAL FIBRILLATION (H): ICD-10-CM

## 2024-12-17 DIAGNOSIS — N20.0 KIDNEY STONE: ICD-10-CM

## 2024-12-17 DIAGNOSIS — Z01.818 PREOP GENERAL PHYSICAL EXAM: Primary | ICD-10-CM

## 2024-12-17 PROCEDURE — 93000 ELECTROCARDIOGRAM COMPLETE: CPT | Performed by: FAMILY MEDICINE

## 2024-12-17 PROCEDURE — 99214 OFFICE O/P EST MOD 30 MIN: CPT | Performed by: FAMILY MEDICINE

## 2024-12-17 RX ORDER — HYDROCODONE BITARTRATE AND ACETAMINOPHEN 5; 325 MG/1; MG/1
1 TABLET ORAL EVERY 6 HOURS PRN
Qty: 3 TABLET | Refills: 0 | Status: SHIPPED | OUTPATIENT
Start: 2024-12-17 | End: 2024-12-19

## 2024-12-17 NOTE — PROGRESS NOTES
Preoperative Evaluation  [address]  [facility] [address]  Phone: [phone]  Fax: [phone]  Primary Provider: Jessy David MD  Pre-op Performing Provider: Jessy David MD  Dec 17, 2024             12/17/2024   Surgical Information   What procedure is being done? kidney stone lasered    Facility or Hospital where procedure/surgery will be performed: [facility] [place]    Who is doing the procedure / surgery? Dr Garcia?    Date of surgery / procedure: dec.19th 2024    Time of surgery / procedure: 7:45 am    Where do you plan to recover after surgery? at home with family        Patient-reported     Fax number for surgical facility: Note does not need to be faxed, will be available electronically in Epic.    Assessment & Plan     The proposed surgical procedure is considered LOW risk.    Preop general physical exam    Kidney stone  Only has 2 tablets left and is concerned about pain before the procedure, will give a couple more tablets.  Also not sleeping well, admits to anxiety as well as pain.    - HYDROcodone-acetaminophen (NORCO) 5-325 MG tablet; Take 1 tablet by mouth every 6 hours as needed for severe pain.    Paroxysmal atrial fibrillation (H)  No atrial fibrillation on EKG, follows with Cardiology, not on anticoagulation.    - EKG 12-lead complete w/read - Clinics    Antiplatelet or Anticoagulation Medication Instructions   - Patient is on no antiplatelet or anticoagulation medications.    Additional Medication Instructions  Take all scheduled medications on the day of surgery    Recommendation  Approval given to proceed with proposed procedure, without further diagnostic evaluation.    Barron Motley is a 74 year old, presenting for the following:  Pre-Op Exam          12/17/2024     3:11 PM   Additional Questions   Roomed by alfreda MARCUS related to upcoming procedure: hematuria, kidney stone        12/17/2024    Pre-Op Questionnaire   Have you ever had a heart attack or stroke? No    Have you ever had surgery on your heart or blood vessels, such as a stent placement, a coronary artery bypass, or surgery on an artery in your head, neck, heart, or legs? No    Do you have chest pain with activity? No    Do you have a history of heart failure? No    Do you currently have a cold, bronchitis or symptoms of other infection? No    Do you have a cough, shortness of breath, or wheezing? No    Do you or anyone in your family have previous history of blood clots? No    Do you or does anyone in your family have a serious bleeding problem such as prolonged bleeding following surgeries or cuts? No    Have you ever had problems with anemia or been told to take iron pills? No    Have you had any abnormal blood loss such as black, tarry or bloody stools, or abnormal vaginal bleeding? No    Have you ever had a blood transfusion? (!) UNKNOWN    Are you willing to have a blood transfusion if it is medically needed before, during, or after your surgery? Yes    Have you or any of your relatives ever had problems with anesthesia? (!) YES --son woke up during operation    Do you have sleep apnea, excessive snoring or daytime drowsiness? (!) YES--snoring    Do you have a CPAP machine? (!) NO     Do you have any artifical heart valves or other implanted medical devices like a pacemaker, defibrillator, or continuous glucose monitor? No    Do you have artificial joints? (!) YES    Are you allergic to latex? No        Patient-reported     Health Care Directive  Patient does not have a Health Care Directive: Discussed advance care planning with patient; however, patient declined at this time.    Preoperative Review of    reviewed - controlled substances reflected in medication list.        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     Priority: Medium    Paroxysmal atrial fibrillation (H)      Priority: Medium    Hyperlipidemia      Priority: Medium    Anxiety 10/15/2009     Priority: Medium    Major depressive disorder, recurrent episode, moderate (H) 10/15/2009     Priority: Medium    Mitral valve disorder 06/29/2006     Priority: Medium      Past Medical History:   Diagnosis Date    Adhesive capsulitis     Adhesive capsulitis of shoulder 07/30/2013    History of blood transfusion     Hyperlipemia     Mitral valve disorder     Mitral valve disorders(424.0)     Hx of mitral valve prolapse    OA (osteoarthritis) of hip     Osteoarthritis of acromioclavicular joint 10/17/2012    Paroxysmal atrial fibrillation (H) 07/01/2008    Paroxysmal supraventricular tachycardia (H)     Rotator cuff tear 10/17/2012    Tobacco abuse      Past Surgical History:   Procedure Laterality Date    CANALPLASTY Right 6/23/2016    Procedure: CANALPLASTY;  Surgeon: Rishabh Boudreaux MD;  [facility]    COLONOSCOPY  07/11/07    COLONOSCOPY N/A 8/1/2023    Procedure: Colonoscopy with Polypectomy;  Surgeon: Clif Cooney MD;  [facility]    CYSTOSCOPY N/A 8/20/2018    Procedure: CYSTOSCOPY;  cystoscopy, mid uretheral sling;  Surgeon: Kamari Sexton MD;  [facility]    DILATION AND CURETTAGE  2/18/16    HD&C    DILATION AND CURETTAGE, OPERATIVE HYSTEROSCOPY, COMBINED N/A 2/18/2016    Procedure: COMBINED DILATION AND CURETTAGE, OPERATIVE HYSTEROSCOPY;  Surgeon: Keshia Chang DO;  [facility]: [facility]    GRAFT THIERSCH STATUS POST TYMPANOMASTOIDECTOMY Right 6/30/2016    Procedure: GRAFT THIERSCH STATUS POST  TYMPANOMASTOIDECTOMY;  Surgeon: Rishabh Boudreaux MD;  [facility]    H ABLATION FOCAL AFIB  10/12/16    HC LAPAROSCOPY, SURGICAL; APPENDECTOMY  08/27/2007    JOINT REPLACEMENT, HIP RT/LT Right 8/12/14    Joint Replacement Hip RT/LT    MEATOPLASTY EAR Right 6/23/2016    Procedure: MEATOPLASTY EAR;  Surgeon: Rishabh Boudreaux MD;  [facility]    MYRINGOTOMY Right 4/26/2016    Procedure: MYRINGOTOMY;  Surgeon: Evi Solorzano MD;  [facility]    SHOULDER SURGERY Left 1/7/15    torn bicep, arthroplasty, rotator cuff    SLING TRANSVAGINAL N/A 8/20/2018    Procedure: SLING TRANSVAGINAL;;  Surgeon: Kamari Sexton MD;  [facility]    TYMPANOMASTOIDECTOMY Right 6/23/2016    Procedure: TYMPANOMASTOIDECTOMY;  Surgeon: Rishabh Boudreaux MD;  [facility]    TYMPANOMASTOIDECTOMY WITH FACIAL MONITORING  12/13/2011    Procedure:TYMPANOMASTOIDECTOMY WITH FACIAL MONITORING; right tympanoplasty, mastoidectomy with facial nerve monitoring; Surgeon:EVI SOLORZANO R; [facility]    ZZC LAP,UTERUS,UNLISTED PROCEDURE  08/27/2007    Lyis of adhesions of the uterus to the abdominal wall.    [facility] TREAT ECTOPIC PREG,ABD PREG  1974    about 3 mos.     Current Outpatient Medications   Medication Sig Dispense Refill    biotin 1000 MCG TABS tablet Take 1,000 mcg by mouth daily.      cetirizine (ZYRTEC) 10 MG tablet Take 10 mg by mouth daily      escitalopram (LEXAPRO) 10 MG tablet Take 1 tablet (10 mg) by mouth daily. 90 tablet 3    flecainide (TAMBOCOR) 150 MG tablet Take 1 tablet (150 mg) by mouth every 12 hours. 180 tablet 0    simvastatin (ZOCOR) 10 MG tablet Take 1 tablet (10 mg) by mouth daily. 90 tablet 3    albuterol (VENTOLIN HFA) 108 (90 Base) MCG/ACT inhaler Inhale 2 puffs into the lungs every 4 hours as needed for shortness of breath or wheezing 18 g 1    triamcinolone (KENALOG) 0.1 % external cream Apply topically 2 times daily 30 g 1       Allergies   Allergen Reactions    Oxycodone Nausea and  "Vomiting        Social History     Tobacco Use    Smoking status: Every Day     Current packs/day: 0.00     Average packs/day: 0.3 packs/day for 32.0 years (8.0 ttl pk-yrs)     Types: Cigarettes     Start date: 1/4/1989     Last attempt to quit: 1/4/2021     Years since quitting: 3.9    Smokeless tobacco: Never    Tobacco comments:     1 cigarette every two weeks   Substance Use Topics    Alcohol use: No       History   Drug Use No             Review of Systems  CONSTITUTIONAL: NEGATIVE for fever, chills, change in weight  INTEGUMENTARY/SKIN: NEGATIVE for worrisome rashes, moles or lesions  EYES: NEGATIVE for vision changes or irritation  ENT/MOUTH: NEGATIVE for ear, mouth and throat problems  RESP: NEGATIVE for significant cough or SOB  CV: NEGATIVE for chest pain, palpitations or peripheral edema  GI: NEGATIVE for nausea, abdominal pain, heartburn, or change in bowel habits   female:  hematuria and flank pain  MUSCULOSKELETAL: NEGATIVE for significant arthralgias or myalgia  NEURO: NEGATIVE for weakness, dizziness or paresthesias  ENDOCRINE: NEGATIVE for temperature intolerance, skin/hair changes  HEME: NEGATIVE for bleeding problems  PSYCHIATRIC: NEGATIVE for changes in mood or affect    Objective    /62 (BP Location: Left arm, Patient Position: Sitting, Cuff Size: Adult Large)   Pulse 62   Temp (!) 96.7  F (35.9  C) (Temporal)   Resp 18   Ht 1.575 m (5' 2\")   Wt 96.2 kg (212 lb)   LMP 05/01/2009   SpO2 97%   BMI 38.78 kg/m     Estimated body mass index is 38.78 kg/m  as calculated from the following:    Height as of this encounter: 1.575 m (5' 2\").    Weight as of this encounter: 96.2 kg (212 lb).  Physical Exam  GENERAL: alert and no distress  EYES: Eyes grossly normal to inspection, PERRL and conjunctivae and sclerae normal  HENT: ear canals and TM's normal, nose and mouth without ulcers or lesions  NECK: no adenopathy, no asymmetry, masses, or scars  RESP: lungs clear to auscultation - no " rales, rhonchi or wheezes  CV: regular rate and rhythm, normal S1 S2, no S3 or S4, no murmur, click or rub, no peripheral edema  ABDOMEN: soft, nontender, no hepatosplenomegaly, no masses and bowel sounds normal  MS: no gross musculoskeletal defects noted, no edema  SKIN: no suspicious lesions or rashes  NEURO: Normal strength and tone, mentation intact and speech normal  PSYCH: mentation appears normal, affect normal/bright    Recent Labs   Lab Test 10/08/24  1405   HGB 13.7         POTASSIUM 4.3   CR 0.84        Diagnostics  No labs were ordered during this visit.   EKG required for history of afib and not completed in the last 90 days.   EKG: sinus bradycardia, no LVH by voltage criteria, unchanged from previous tracings    Revised Cardiac Risk Index (RCRI)  The patient has the following serious cardiovascular risks for perioperative complications:   - No serious cardiac risks = 0 points     RCRI Interpretation: 0 points: Class I (very low risk - 0.4% complication rate)         Signed Electronically by: Jessy David MD  A copy of this evaluation report is provided to the requesting physician.

## 2024-12-19 ENCOUNTER — ANESTHESIA (OUTPATIENT)
Dept: SURGERY | Facility: AMBULATORY SURGERY CENTER | Age: 74
End: 2024-12-19
Payer: MEDICARE

## 2024-12-19 ENCOUNTER — HOSPITAL ENCOUNTER (OUTPATIENT)
Facility: AMBULATORY SURGERY CENTER | Age: 74
Discharge: HOME | End: 2024-12-19
Attending: UROLOGY | Admitting: UROLOGY
Payer: MEDICARE

## 2024-12-19 VITALS
OXYGEN SATURATION: 97 % | RESPIRATION RATE: 20 BRPM | WEIGHT: 212 LBS | HEART RATE: 61 BPM | TEMPERATURE: 98 F | BODY MASS INDEX: 38.78 KG/M2 | SYSTOLIC BLOOD PRESSURE: 129 MMHG | DIASTOLIC BLOOD PRESSURE: 67 MMHG

## 2024-12-19 DIAGNOSIS — N20.0 KIDNEY STONE: ICD-10-CM

## 2024-12-19 RX ORDER — CEFAZOLIN SODIUM 2 G/50ML
2 SOLUTION INTRAVENOUS SEE ADMIN INSTRUCTIONS
Status: ACTIVE | OUTPATIENT
Start: 2024-12-19

## 2024-12-19 RX ORDER — PROPOFOL 10 MG/ML
INJECTION, EMULSION INTRAVENOUS PRN
Status: DISCONTINUED | OUTPATIENT
Start: 2024-12-19 | End: 2024-12-19

## 2024-12-19 RX ORDER — FENTANYL CITRATE 50 UG/ML
50 INJECTION, SOLUTION INTRAMUSCULAR; INTRAVENOUS EVERY 5 MIN PRN
Status: ACTIVE | OUTPATIENT
Start: 2024-12-19

## 2024-12-19 RX ORDER — ONDANSETRON 4 MG/1
4 TABLET, ORALLY DISINTEGRATING ORAL EVERY 30 MIN PRN
Status: ACTIVE | OUTPATIENT
Start: 2024-12-19

## 2024-12-19 RX ORDER — GLYCOPYRROLATE 0.2 MG/ML
INJECTION, SOLUTION INTRAMUSCULAR; INTRAVENOUS PRN
Status: DISCONTINUED | OUTPATIENT
Start: 2024-12-19 | End: 2024-12-19

## 2024-12-19 RX ORDER — DEXAMETHASONE SODIUM PHOSPHATE 4 MG/ML
4 INJECTION, SOLUTION INTRA-ARTICULAR; INTRALESIONAL; INTRAMUSCULAR; INTRAVENOUS; SOFT TISSUE
Status: ACTIVE | OUTPATIENT
Start: 2024-12-19

## 2024-12-19 RX ORDER — SODIUM CHLORIDE, SODIUM LACTATE, POTASSIUM CHLORIDE, CALCIUM CHLORIDE 600; 310; 30; 20 MG/100ML; MG/100ML; MG/100ML; MG/100ML
INJECTION, SOLUTION INTRAVENOUS CONTINUOUS
Status: DISPENSED | OUTPATIENT
Start: 2024-12-19

## 2024-12-19 RX ORDER — FENTANYL CITRATE 50 UG/ML
INJECTION, SOLUTION INTRAMUSCULAR; INTRAVENOUS PRN
Status: DISCONTINUED | OUTPATIENT
Start: 2024-12-19 | End: 2024-12-19

## 2024-12-19 RX ORDER — ONDANSETRON 2 MG/ML
4 INJECTION INTRAMUSCULAR; INTRAVENOUS EVERY 30 MIN PRN
Status: ACTIVE | OUTPATIENT
Start: 2024-12-19

## 2024-12-19 RX ORDER — CEFAZOLIN SODIUM 2 G/50ML
2 SOLUTION INTRAVENOUS
Status: COMPLETED | OUTPATIENT
Start: 2024-12-19 | End: 2024-12-19

## 2024-12-19 RX ORDER — NALOXONE HYDROCHLORIDE 0.4 MG/ML
0.1 INJECTION, SOLUTION INTRAMUSCULAR; INTRAVENOUS; SUBCUTANEOUS
Status: ACTIVE | OUTPATIENT
Start: 2024-12-19

## 2024-12-19 RX ORDER — ACETAMINOPHEN 325 MG/1
975 TABLET ORAL ONCE
Status: ACTIVE | OUTPATIENT
Start: 2024-12-19

## 2024-12-19 RX ORDER — ACETAMINOPHEN 325 MG/1
975 TABLET ORAL ONCE
Status: COMPLETED | OUTPATIENT
Start: 2024-12-19 | End: 2024-12-19

## 2024-12-19 RX ORDER — FENTANYL CITRATE 50 UG/ML
25 INJECTION, SOLUTION INTRAMUSCULAR; INTRAVENOUS EVERY 5 MIN PRN
Status: ACTIVE | OUTPATIENT
Start: 2024-12-19

## 2024-12-19 RX ORDER — LIDOCAINE HYDROCHLORIDE 20 MG/ML
INJECTION, SOLUTION INFILTRATION; PERINEURAL PRN
Status: DISCONTINUED | OUTPATIENT
Start: 2024-12-19 | End: 2024-12-19

## 2024-12-19 RX ORDER — EPHEDRINE SULFATE 50 MG/ML
INJECTION, SOLUTION INTRAMUSCULAR; INTRAVENOUS; SUBCUTANEOUS PRN
Status: DISCONTINUED | OUTPATIENT
Start: 2024-12-19 | End: 2024-12-19

## 2024-12-19 RX ORDER — KETOROLAC TROMETHAMINE 30 MG/ML
15 INJECTION, SOLUTION INTRAMUSCULAR; INTRAVENOUS EVERY 6 HOURS PRN
Status: ACTIVE | OUTPATIENT
Start: 2024-12-19 | End: 2024-12-24

## 2024-12-19 RX ORDER — PROPOFOL 10 MG/ML
INJECTION, EMULSION INTRAVENOUS CONTINUOUS PRN
Status: DISCONTINUED | OUTPATIENT
Start: 2024-12-19 | End: 2024-12-19

## 2024-12-19 RX ORDER — HYDROCODONE BITARTRATE AND ACETAMINOPHEN 5; 325 MG/1; MG/1
1 TABLET ORAL EVERY 6 HOURS PRN
Qty: 12 TABLET | Refills: 0 | Status: SHIPPED | OUTPATIENT
Start: 2024-12-19

## 2024-12-19 RX ORDER — DEXAMETHASONE SODIUM PHOSPHATE 4 MG/ML
INJECTION, SOLUTION INTRA-ARTICULAR; INTRALESIONAL; INTRAMUSCULAR; INTRAVENOUS; SOFT TISSUE PRN
Status: DISCONTINUED | OUTPATIENT
Start: 2024-12-19 | End: 2024-12-19

## 2024-12-19 RX ORDER — CEPHALEXIN 500 MG/1
500 CAPSULE ORAL 3 TIMES DAILY
Qty: 9 CAPSULE | Refills: 0 | Status: SHIPPED | OUTPATIENT
Start: 2024-12-19 | End: 2024-12-22

## 2024-12-19 RX ORDER — LIDOCAINE 40 MG/G
CREAM TOPICAL
Status: DISPENSED | OUTPATIENT
Start: 2024-12-19

## 2024-12-19 RX ORDER — ONDANSETRON 2 MG/ML
INJECTION INTRAMUSCULAR; INTRAVENOUS PRN
Status: DISCONTINUED | OUTPATIENT
Start: 2024-12-19 | End: 2024-12-19

## 2024-12-19 RX ADMIN — ACETAMINOPHEN 975 MG: 325 TABLET ORAL at 07:40

## 2024-12-19 RX ADMIN — CEFAZOLIN SODIUM 2 G: 2 SOLUTION INTRAVENOUS at 08:40

## 2024-12-19 RX ADMIN — PROPOFOL 180 MG: 10 INJECTION, EMULSION INTRAVENOUS at 08:44

## 2024-12-19 RX ADMIN — PROPOFOL 50 MCG/KG/MIN: 10 INJECTION, EMULSION INTRAVENOUS at 08:43

## 2024-12-19 RX ADMIN — GLYCOPYRROLATE 0.1 MG: 0.2 INJECTION, SOLUTION INTRAMUSCULAR; INTRAVENOUS at 08:56

## 2024-12-19 RX ADMIN — LIDOCAINE HYDROCHLORIDE 100 MG: 20 INJECTION, SOLUTION INFILTRATION; PERINEURAL at 08:44

## 2024-12-19 RX ADMIN — KETOROLAC TROMETHAMINE 15 MG: 30 INJECTION, SOLUTION INTRAMUSCULAR; INTRAVENOUS at 10:39

## 2024-12-19 RX ADMIN — EPHEDRINE SULFATE 5 MG: 50 INJECTION, SOLUTION INTRAMUSCULAR; INTRAVENOUS; SUBCUTANEOUS at 09:03

## 2024-12-19 RX ADMIN — ONDANSETRON 4 MG: 2 INJECTION INTRAMUSCULAR; INTRAVENOUS at 09:05

## 2024-12-19 RX ADMIN — FENTANYL CITRATE 50 MCG: 50 INJECTION, SOLUTION INTRAMUSCULAR; INTRAVENOUS at 08:44

## 2024-12-19 RX ADMIN — SODIUM CHLORIDE, SODIUM LACTATE, POTASSIUM CHLORIDE, CALCIUM CHLORIDE: 600; 310; 30; 20 INJECTION, SOLUTION INTRAVENOUS at 07:52

## 2024-12-19 RX ADMIN — EPHEDRINE SULFATE 5 MG: 50 INJECTION, SOLUTION INTRAMUSCULAR; INTRAVENOUS; SUBCUTANEOUS at 08:53

## 2024-12-19 RX ADMIN — DEXAMETHASONE SODIUM PHOSPHATE 4 MG: 4 INJECTION, SOLUTION INTRA-ARTICULAR; INTRALESIONAL; INTRAMUSCULAR; INTRAVENOUS; SOFT TISSUE at 08:46

## 2024-12-19 NOTE — BRIEF OP NOTE
[facility]  Brief Operative Note    Pre-operative diagnosis: Left upper ureteral stone   Post-operative diagnosis Left lower ureteral stone   Procedure: Procedure(s):  LITHOTRIPSY, EXTRACORPOREAL SHOCK WAVE WITH CYSTOSCOPY AND URETERAL STENT INSERTION   Surgeon: Rishabh Nixon MD   Assistants(s): None   Anesthesia: General    Estimated blood loss: minimal                   Specimens: None   Implants: stent       Complications: None   Condition on discharge: Stable   Findings: See dictated operative report for full details

## 2024-12-19 NOTE — ANESTHESIA PREPROCEDURE EVALUATION
Anesthesia Pre-Procedure Evaluation    Patient: Tiesha Gurrola   MRN: [id number] : 1950        Procedure : Procedure(s):  LITHOTRIPSY, EXTRACORPOREAL SHOCK WAVE WITH CYSTOSCOPY AND URETERAL STENT INSERTION          Past Medical History:   Diagnosis Date    Adhesive capsulitis     Adhesive capsulitis of shoulder 2013    History of blood transfusion     Hyperlipemia     Mitral valve disorder     Mitral valve disorders(424.0)     Hx of mitral valve prolapse    OA (osteoarthritis) of hip     Osteoarthritis of acromioclavicular joint 10/17/2012    Paroxysmal atrial fibrillation (H) 2008    Paroxysmal supraventricular tachycardia (H)     Rotator cuff tear 10/17/2012    Tobacco abuse       Past Surgical History:   Procedure Laterality Date    CANALPLASTY Right 2016    Procedure: CANALPLASTY;  Surgeon: Rishabh Boudreaux MD;  [facility]: [facility]    COLONOSCOPY  07    COLONOSCOPY N/A 2023    Procedure: Colonoscopy with Polypectomy;  Surgeon: Clif Cooney MD;  Location: [facility]    CYSTOSCOPY N/A 2018    Procedure: CYSTOSCOPY;  cystoscopy, mid uretheral sling;  Surgeon: Kamari Sexton MD;  Location: [facility]    DILATION AND CURETTAGE  16    HD&C    DILATION AND CURETTAGE, OPERATIVE HYSTEROSCOPY, COMBINED N/A 2016    Procedure: COMBINED DILATION AND CURETTAGE, OPERATIVE HYSTEROSCOPY;  Surgeon: Keshia Chang DO;  Location: [facility]    GRAFT THIERSCH STATUS POST TYMPANOMASTOIDECTOMY Right 2016    Procedure: GRAFT THIERSCH STATUS POST TYMPANOMASTOIDECTOMY;  Surgeon: Rishabh Boudreaux MD;  [facility]: [facility]    H ABLATION FOCAL AFIB  10/12/16    HC LAPAROSCOPY, SURGICAL; APPENDECTOMY  2007    JOINT REPLACEMENT, HIP RT/LT Right 14    Joint Replacement Hip RT/LT    MEATOPLASTY EAR Right 2016    Procedure: MEATOPLASTY EAR;  Surgeon: Rishabh Boudreaux MD;  [facility]    MYRINGOTOMY Right 2016    Procedure: MYRINGOTOMY;  Surgeon: Jeanine  MD Evi;  [facility]    SHOULDER SURGERY Left 1/7/15    torn bicep, arthroplasty, rotator cuff    SLING TRANSVAGINAL N/A 8/20/2018    Procedure: SLING TRANSVAGINAL;;  Surgeon: Kamari Sexton MD;  [facility]    TYMPANOMASTOIDECTOMY Right 6/23/2016    Procedure: TYMPANOMASTOIDECTOMY;  Surgeon: Rishabh Boudreaux MD;  [facility]    TYMPANOMASTOIDECTOMY WITH FACIAL MONITORING  12/13/2011    Procedure:TYMPANOMASTOIDECTOMY WITH FACIAL MONITORING; right tympanoplasty, mastoidectomy with facial nerve monitoring; Surgeon:EVI LLAMAS; Location:[facility]    ZZC LAP,UTERUS,UNLISTED PROCEDURE  08/27/2007    Lyis of adhesions of the uterus to the abdominal wall.    ZZC TREAT ECTOPIC PREG,ABD PREG  1974    about 3 mos.      Allergies   Allergen Reactions    Oxycodone Nausea and Vomiting      Social History     Tobacco Use    Smoking status: Every Day     Current packs/day: 0.00     Average packs/day: 0.3 packs/day for 32.0 years (8.0 ttl pk-yrs)     Types: Cigarettes     Start date: 1/4/1989     Last attempt to quit: 1/4/2021     Years since quitting: 3.9    Smokeless tobacco: Never    Tobacco comments:     1 cigarette every two weeks   Substance Use Topics    Alcohol use: No      Wt Readings from Last 1 Encounters:   12/17/24 96.2 kg (212 lb)        Anesthesia Evaluation   Pt has had prior anesthetic.     No history of anesthetic complications       ROS/MED HX  ENT/Pulmonary:  - neg pulmonary ROS   (+) sleep apnea, doesn't use CPAP,                                      Neurologic:  - neg neurologic ROS     Cardiovascular:  - neg cardiovascular ROS     METS/Exercise Tolerance: >4 METS    Hematologic:  - neg hematologic  ROS     Musculoskeletal:  - neg musculoskeletal ROS     GI/Hepatic:  - neg GI/hepatic ROS     Renal/Genitourinary:  - neg Renal ROS     Endo:  - neg endo ROS     Psychiatric/Substance Use:  - neg psychiatric ROS     Infectious Disease:  - neg infectious disease ROS     Malignancy:  - neg  malignancy ROS     Other:  - neg other ROS          Physical Exam    Airway        Mallampati: II   TM distance: > 3 FB   Neck ROM: full   Mouth opening: > 3 cm    Respiratory Devices and Support         Dental       (+) Modest Abnormalities - crowns, retainers, 1 or 2 missing teeth      Cardiovascular   cardiovascular exam normal          Pulmonary   pulmonary exam normal                OUTSIDE LABS:  CBC:   Lab Results   Component Value Date    WBC 6.1 10/08/2024    WBC 5.7 05/05/2023    HGB 13.7 10/08/2024    HGB 13.6 05/05/2023    HCT 39.8 10/08/2024    HCT 40.8 05/05/2023     10/08/2024     05/05/2023     BMP:   Lab Results   Component Value Date     10/08/2024     05/05/2023    POTASSIUM 4.3 10/08/2024    POTASSIUM 4.2 05/05/2023    CHLORIDE 105 10/08/2024    CHLORIDE 105 05/05/2023    CO2 23 10/08/2024    CO2 25 05/05/2023    BUN 15.8 10/08/2024    BUN 14.4 05/05/2023    CR 0.84 10/08/2024    CR 0.74 05/05/2023    GLC 87 10/08/2024    GLC 87 05/05/2023     COAGS:   Lab Results   Component Value Date    INR 0.99 10/12/2016     POC:   Lab Results   Component Value Date     (H) 10/13/2016    HCG Negative 08/27/2007     HEPATIC:   Lab Results   Component Value Date    ALBUMIN 4.1 10/08/2024    PROTTOTAL 7.1 10/08/2024    ALT 12 10/08/2024    AST 15 10/08/2024    ALKPHOS 66 10/08/2024    BILITOTAL 0.4 10/08/2024     OTHER:   Lab Results   Component Value Date    CONSTANTIN 9.1 10/08/2024    TSH 4.09 05/05/2023    CRP 10.0 (H) 03/27/2017    SED 10 03/27/2017       Anesthesia Plan    ASA Status:  3    NPO Status:  NPO Appropriate    Anesthesia Type: General.     - Airway: LMA   Induction: Propofol.   Maintenance: TIVA.        Consents    Anesthesia Plan(s) and associated risks, benefits, and realistic alternatives discussed. Questions answered and patient/representative(s) expressed understanding.     - Discussed:     - Discussed with:  Patient            Postoperative Care    Pain  "management: IV analgesics, Oral pain medications, Multi-modal analgesia.   PONV prophylaxis: Background Propofol Infusion, Ondansetron (or other 5HT-3)     Comments:               Olayinka Looney MD    I have reviewed the pertinent notes and labs in the chart from the past 30 days and (re)examined the patient.  Any updates or changes from those notes are reflected in this note.                         # Obesity: Estimated body mass index is 38.78 kg/m  as calculated from the following:    Height as of 12/17/24: 1.575 m (5' 2\").    Weight as of 12/17/24: 96.2 kg (212 lb).             "

## 2024-12-19 NOTE — ANESTHESIA CARE TRANSFER NOTE
Patient: Tiesha Gurrola    Procedure: Procedure(s):  LITHOTRIPSY, EXTRACORPOREAL SHOCK WAVE WITH CYSTOSCOPY AND URETERAL STENT INSERTION       Diagnosis: Kidney stone [N20.0]  Diagnosis Additional Information: No value filed.    Anesthesia Type:   General     Note:    Oropharynx: oropharynx clear of all foreign objects  Level of Consciousness: drowsy  Oxygen Supplementation: face mask  Level of Supplemental Oxygen (L/min / FiO2): 8  Independent Airway: airway patency satisfactory and stable  Dentition: dentition unchanged  Vital Signs Stable: post-procedure vital signs reviewed and stable  Report to RN Given: handoff report given  Patient transferred to: PACU    Handoff Report: Identifed the Patient, Identified the Reponsible Provider, Reviewed the pertinent medical history, Discussed the surgical course, Reviewed Intra-OP anesthesia mangement and issues during anesthesia, Set expectations for post-procedure period and Allowed opportunity for questions and acknowledgement of understanding    Vitals:  Vitals Value Taken Time   BP     Temp     Pulse     Resp     SpO2         Electronically Signed By: NAVI Joel CRNA  December 19, 2024  10:07 AM

## 2024-12-19 NOTE — ANESTHESIA POSTPROCEDURE EVALUATION
Patient: Tiesha Gurrola    Procedure: Procedure(s):  LITHOTRIPSY, EXTRACORPOREAL SHOCK WAVE WITH CYSTOSCOPY AND URETERAL STENT INSERTION       Anesthesia Type:  General    Note:  Disposition: Outpatient   Postop Pain Control: Uneventful            Sign Out: Well controlled pain   PONV: No   Neuro/Psych: Uneventful            Sign Out: Acceptable/Baseline neuro status   Airway/Respiratory: Uneventful            Sign Out: Acceptable/Baseline resp. status   CV/Hemodynamics: Uneventful            Sign Out: Acceptable CV status; No obvious hypovolemia; No obvious fluid overload   Other NRE: NONE   DID A NON-ROUTINE EVENT OCCUR? No           Last vitals:  Vitals Value Taken Time   /65 12/19/24 1016   Temp 98  F (36.7  C) 12/19/24 1006   Pulse 68 12/19/24 1016   Resp 15 12/19/24 1016   SpO2 93 % 12/19/24 1016       Electronically Signed By: Olayinka Looney MD  December 19, 2024  10:19 AM

## 2024-12-19 NOTE — OP NOTE
SURGEON: Rishabh Nixon MD   PREOPERATIVE DIAGNOSIS: left ureteral stone.   POSTOPERATIVE DIAGNOSIS: same.   PROCEDURE PERFORMED:   Cystoscopy and left stent placement   Extracorporeal shockwave lithotripsy.   DESCRIPTION OF PROCEDURE: The patient was brought to the operating room. After general anesthesia was induced, she was placed in the dorsolithotomy position.  She was draped and prepped sterilely.  Preop antibiotics given. Under fluoroscopy, the  stone was identified.  It was in the distal ureter now.  A sensor wire was then placed under fluoroscopy followed by placement of a 4.7 Italian by 20 cm ureteral stent.  A total of 3600 shocks was given to the stone. There were some changes under fluoroscopy to the stone. The patient tolerated the procedure well. No complications were identified during the procedure

## 2024-12-19 NOTE — DISCHARGE INSTRUCTIONS
Tylenol 975 mg was given at 7:45 AM. Next dose available after 1:45 PM.    You should not take more then 4,000 mg of tylenol/acetaminophen in a 24 hour period.

## 2024-12-19 NOTE — ANESTHESIA PROCEDURE NOTES
Airway       Patient location during procedure: OR  Staff -        Performed By: CRNA  Consent for Airway        Urgency: elective  Indications and Patient Condition       Indications for airway management: robin-procedural       Induction type:intravenous       Mask difficulty assessment: 0 - not attempted    Final Airway Details       Final airway type: supraglottic airway    Supraglottic Airway Details        Type: LMA       Brand: I-Gel       LMA size: 4    Post intubation assessment        Placement verified by: capnometry, equal breath sounds and chest rise        Number of attempts at approach: 1       Number of other approaches attempted: 0       Secured with: tape       Ease of procedure: easy       Dentition: Intact        
ED MD

## 2024-12-20 ENCOUNTER — TELEPHONE (OUTPATIENT)
Dept: UROLOGY | Facility: CLINIC | Age: 74
End: 2024-12-20
Payer: MEDICARE

## 2024-12-20 NOTE — TELEPHONE ENCOUNTER
[facility] Call Center    Phone Message    May a detailed message be left on voicemail: yes     Reason for Call: Other: Pt  had surgery and was told to be seen in 5 days. Please call pt back.Thanks.     Action Taken: Other: [facility]    Travel Screening: Not Applicable     Date of Service:

## 2024-12-23 NOTE — TELEPHONE ENCOUNTER
Patient scheduled for stent removal/follow up appointment with Dr. Nixon in [place].   Patient aware and verbalized understanding of plan.  Zohreh Patrick RN on 12/23/2024 at 3:33 PM

## 2025-01-06 ENCOUNTER — ANCILLARY PROCEDURE (OUTPATIENT)
Dept: GENERAL RADIOLOGY | Facility: CLINIC | Age: 75
End: 2025-01-06
Attending: UROLOGY
Payer: MEDICARE

## 2025-01-06 ENCOUNTER — OFFICE VISIT (OUTPATIENT)
Dept: UROLOGY | Facility: CLINIC | Age: 75
End: 2025-01-06
Payer: MEDICARE

## 2025-01-06 DIAGNOSIS — N20.0 KIDNEY STONE: ICD-10-CM

## 2025-01-06 DIAGNOSIS — N20.0 KIDNEY STONE: Primary | ICD-10-CM

## 2025-01-06 PROCEDURE — 52310 CYSTOSCOPY AND TREATMENT: CPT | Performed by: UROLOGY

## 2025-01-06 PROCEDURE — 74018 RADEX ABDOMEN 1 VIEW: CPT | Mod: TC | Performed by: STUDENT IN AN ORGANIZED HEALTH CARE EDUCATION/TRAINING PROGRAM

## 2025-01-06 RX ORDER — TAMSULOSIN HYDROCHLORIDE 0.4 MG/1
0.4 CAPSULE ORAL EVERY EVENING
Qty: 14 CAPSULE | Refills: 0 | Status: SHIPPED | OUTPATIENT
Start: 2025-01-06 | End: 2025-01-20

## 2025-01-06 NOTE — PROGRESS NOTES
Patient returns to clinic for cysto and stent removal.  she is s/p left ESWL recently.  KUB today shows no distal ureteral stones.    Procedure: patient was brought to the cysto suite.  she was draped and prepped in the usual surgical fashion.  Cystoscopy placed. Stent removed without problems.    Recommendation:  flomax 0.4 mg at bedtime for 2 weeks.

## 2025-01-14 ENCOUNTER — TELEPHONE (OUTPATIENT)
Dept: UROLOGY | Facility: CLINIC | Age: 75
End: 2025-01-14

## 2025-01-14 ENCOUNTER — HOSPITAL ENCOUNTER (OUTPATIENT)
Dept: CARDIOLOGY | Facility: CLINIC | Age: 75
Discharge: HOME | End: 2025-01-14
Attending: FAMILY MEDICINE
Payer: MEDICARE

## 2025-01-14 DIAGNOSIS — I48.0 PAROXYSMAL ATRIAL FIBRILLATION (H): ICD-10-CM

## 2025-01-14 DIAGNOSIS — I48.0 PAROXYSMAL ATRIAL FIBRILLATION (H): Primary | ICD-10-CM

## 2025-01-14 PROCEDURE — 93005 ELECTROCARDIOGRAM TRACING: CPT | Performed by: INTERNAL MEDICINE

## 2025-01-14 NOTE — TELEPHONE ENCOUNTER
Received message on [facility] voicemail from patient. Per message, patient had a kidney stone surgery and stent removed by Dr. Nixon. Patient has a cavity that needs to be filled and the dentist needs an update from Dr. Nixon that it is okay to proceed with dental visit. Patient is requesting for this information to be faxed to Dr. Dutta at fax# [phone]. Patient can be reached at [phone].    Message sent to Dr. Nixon's team for follow up.    Estelle Villatoro RN, BSN

## 2025-01-14 NOTE — LETTER
[facility] [address]  [address]  Tel. [phone]    January 15, 2025        Tiesha Gurrola  [address] [address] [place] [address]            To Whom it May Concern,    Tiesha Gurrola is cleared for dental work following her urology procedure. Please call our office with any questions or concerns.     Sincerely,            Rishabh Nixon MD

## 2025-01-15 NOTE — TELEPHONE ENCOUNTER
Per rosemary Wallis to proceed with dental work. Clearance letter faxed to dental office at number provided below. Patient aware.     Zully Hutchins RN on 1/15/2025 at 10:13 AM

## 2025-03-07 NOTE — PROGRESS NOTES
~Cardiology Clinic Visit~    Primary Cardiologist: Dr. Vega    History of Present Illness    Tiesha Gurrola is a very pleasant 74 year old female with a past medical history notable for symptomatic paroxysmal atrial fibrillation refractory to flecainide prompting an ablation in 2016.  She underwent isolation of pulmonary veins and empiric ablation of the CTI at that time.     In 2019, she was tapered off her flecainide to truly  the success of the ablation.  She reduced her dosing to 150 mg AM + 75 mg PM and did quite well for about 2-months.  She then reduced her dose to 75 mg BID and unfortunately with this started to experience more palpitations > She went back up on the dose and ended up on 150 mg BID.  Despite having palpitations, she has had no documented recurrence of AF since her ablation in 2016.    From a functional standpoint, in past visits, patient says she stays quite active without SOB, CP or palpitations.  She is able to do ADLs and yard work and feels well.  BMP stable from October 2024.  On flecainide 150 mg BID.    EKG 1/14/25: NSR with expected first degree AVB on Flecainide (Rosemary 252 ms), QRS stable 110 ms.  __________________________________________________________________         Assessment and Impression:     Paroxysmal atrial fibrillation  RENU  HLD  Morbid obesity  MDD         Recommendations and Plan:     No changes today from a cardiac standpoint.  Continue Flecainide 150 mg BID.  Ongoing healthy lifestyle and routine follow up with PCP.  Routine EKG in 1-year.  Cardiology follow up in 1-year.  __________________________________________________________________    Thank you for the opportunity to participate in this pleasant patient's care.    We would be happy to see this patient sooner for any concerns in the meantime.    NAVI Johnson, CNP   Nurse Practitioner  [facility]    Today's clinic visit entailed:  The following tests were  independently interpreted by me as noted in my documentation: EKG, labs  Ordering of each unique test  Prescription drug management  The level of medical decision making during this visit was of moderate complexity.  Review of the result(s) of each unique test - cardiac testing, cardiac imaging, labs  Care everywhere reviewed for additional records to facilitate comprehensive patient care.    Orders this Visit:  Orders Placed This Encounter   Procedures    Follow-Up with Cardiology    EKG 12-lead complete w/read - Clinics (future- to be scheduled)     Orders Placed This Encounter   Medications    flecainide (TAMBOCOR) 150 MG tablet     Sig: Take 1 tablet (150 mg) by mouth every 12 hours.     Dispense:  60 tablet     Refill:  11    ALPRAZolam (XANAX) 0.25 MG tablet     Sig: Take 1 tablet (0.25 mg) by mouth nightly as needed for anxiety.     Dispense:  20 tablet     Refill:  0     Medications Discontinued During This Encounter   Medication Reason    HYDROcodone-acetaminophen (NORCO) 5-325 MG tablet     flecainide (TAMBOCOR) 150 MG tablet Reorder (No AVS)     Encounter Diagnoses   Name Primary?    Paroxysmal atrial fibrillation (H) Yes    Anxiety     Major depressive disorder, recurrent episode, moderate (H)     Morbid obesity (H)      CURRENT MEDICATIONS:  Current Outpatient Medications   Medication Sig Dispense Refill    albuterol (VENTOLIN HFA) 108 (90 Base) MCG/ACT inhaler Inhale 2 puffs into the lungs every 4 hours as needed for shortness of breath or wheezing 18 g 1    ALPRAZolam (XANAX) 0.25 MG tablet Take 1 tablet (0.25 mg) by mouth nightly as needed for anxiety. 20 tablet 0    biotin 1000 MCG TABS tablet Take 1,000 mcg by mouth daily.      cetirizine (ZYRTEC) 10 MG tablet Take 10 mg by mouth daily      escitalopram (LEXAPRO) 10 MG tablet Take 1 tablet (10 mg) by mouth daily. 90 tablet 3    flecainide (TAMBOCOR) 150 MG tablet Take 1 tablet (150 mg) by mouth every 12 hours. 60 tablet 11    simvastatin (ZOCOR) 10  "MG tablet Take 1 tablet (10 mg) by mouth daily. 90 tablet 3    triamcinolone (KENALOG) 0.1 % external cream Apply topically 2 times daily 30 g 1     ALLERGIES     Allergies   Allergen Reactions    Oxycodone Nausea and Vomiting     PAST MEDICAL, SURGICAL, FAMILY, SOCIAL HISTORY:  History was reviewed and updated as needed, see medical record.    Review of Systems:  A 10-point Review Of Systems is otherwise normal except for that which is noted in the HPI and interval summary.    Physical Exam:    Vitals: /84 (BP Location: Right arm, Patient Position: Sitting, Cuff Size: Adult Regular)   Pulse 58   Resp 18   Ht 1.575 m (5' 2\")   Wt 93.9 kg (207 lb)   LMP 05/01/2009   SpO2 97%   BMI 37.86 kg/m    Constitutional: Appears stated age, well nourished, NAD.  Neck: Supple. Carotid pulses full and equal.   Respiratory: Non-labored. Lungs CTAB.  Cardiovascular: RRR, normal S1 and S2. No M/G/R.  No edema.  GI: Soft, non-distended, non-tender.  Skin: Warm and dry.   Musculoskeletal/Extremities: Symmetrical movement.  Neurologic: No gross focal deficits. Alert, awake.  Psychiatric: Affect appropriate. Mentation normal.    Recent Lab Results:  LIPID RESULTS:  Lab Results   Component Value Date    CHOL 161 10/08/2024    CHOL 144 04/09/2021    HDL 40 (L) 10/08/2024    HDL 43 (L) 04/09/2021    LDL 88 10/08/2024    LDL 74 04/09/2021    TRIG 163 (H) 10/08/2024    TRIG 136 04/09/2021    CHOLHDLRATIO 5.8 (H) 08/20/2015     LIVER ENZYME RESULTS:  Lab Results   Component Value Date    AST 15 10/08/2024    AST 11 04/09/2021    ALT 12 10/08/2024    ALT 26 04/09/2021     CBC RESULTS:  Lab Results   Component Value Date    WBC 6.1 10/08/2024    WBC 6.7 05/05/2021    RBC 4.35 10/08/2024    RBC 4.41 05/05/2021    HGB 13.7 10/08/2024    HGB 13.6 05/05/2021    HCT 39.8 10/08/2024    HCT 40.6 05/05/2021    MCV 92 10/08/2024    MCV 92 05/05/2021    MCH 31.5 10/08/2024    MCH 30.8 05/05/2021    MCHC 34.4 10/08/2024    MCHC 33.5 " "05/05/2021    RDW 12.4 10/08/2024    RDW 12.4 05/05/2021     10/08/2024     05/05/2021     BMP RESULTS:  Lab Results   Component Value Date     10/08/2024     04/09/2021    POTASSIUM 4.3 10/08/2024    POTASSIUM 4.2 03/11/2022    POTASSIUM 4.5 04/09/2021    CHLORIDE 105 10/08/2024    CHLORIDE 109 03/11/2022    CHLORIDE 107 04/09/2021    CO2 23 10/08/2024    CO2 29 03/11/2022    CO2 28 04/09/2021    ANIONGAP 12 10/08/2024    ANIONGAP 2 (L) 03/11/2022    ANIONGAP 4 04/09/2021    GLC 87 10/08/2024     (H) 03/11/2022    GLC 89 04/09/2021    BUN 15.8 10/08/2024    BUN 19 03/11/2022    BUN 17 04/09/2021    CR 0.84 10/08/2024    CR 0.82 04/09/2021    GFRESTIMATED 74 10/08/2024    GFRESTIMATED 73 04/09/2021    GFRESTBLACK 84 04/09/2021    CONSTANTIN 9.1 10/08/2024    CONSTANTIN 8.6 04/09/2021      A1C RESULTS:  No results found for: \"A1C\"  INR RESULTS:  Lab Results   Component Value Date    INR 0.99 10/12/2016                     "

## 2025-03-11 ENCOUNTER — OFFICE VISIT (OUTPATIENT)
Dept: CARDIOLOGY | Facility: CLINIC | Age: 75
End: 2025-03-11
Payer: MEDICARE

## 2025-03-11 VITALS
WEIGHT: 207 LBS | OXYGEN SATURATION: 97 % | HEIGHT: 62 IN | BODY MASS INDEX: 38.09 KG/M2 | RESPIRATION RATE: 18 BRPM | HEART RATE: 58 BPM | DIASTOLIC BLOOD PRESSURE: 84 MMHG | SYSTOLIC BLOOD PRESSURE: 138 MMHG

## 2025-03-11 DIAGNOSIS — F41.9 ANXIETY: ICD-10-CM

## 2025-03-11 DIAGNOSIS — F33.1 MAJOR DEPRESSIVE DISORDER, RECURRENT EPISODE, MODERATE (H): ICD-10-CM

## 2025-03-11 DIAGNOSIS — E66.01 MORBID OBESITY (H): ICD-10-CM

## 2025-03-11 DIAGNOSIS — I48.0 PAROXYSMAL ATRIAL FIBRILLATION (H): Primary | ICD-10-CM

## 2025-03-11 RX ORDER — FLECAINIDE ACETATE 150 MG/1
150 TABLET ORAL EVERY 12 HOURS
Qty: 60 TABLET | Refills: 11 | Status: SHIPPED | OUTPATIENT
Start: 2025-03-11

## 2025-03-11 RX ORDER — ALPRAZOLAM 0.25 MG
0.25 TABLET ORAL
Qty: 20 TABLET | Refills: 0 | Status: SHIPPED | OUTPATIENT
Start: 2025-03-11

## 2025-03-11 NOTE — LETTER
3/11/2025    Jessy David MD  [address] [facility] [facility] [address]  [place] [address]    RE: Tiesha Gurrola       Dear Colleague,     I had the pleasure of seeing Tiesha Gurrola in the [facility].              ~Cardiology Clinic Visit~    Primary Cardiologist: Dr. Vega    History of Present Illness    Tiesha Gurrola is a very pleasant 74 year old female with a past medical history notable for symptomatic paroxysmal atrial fibrillation refractory to flecainide prompting an ablation in 2016.  She underwent isolation of pulmonary veins and empiric ablation of the CTI at that time.     In 2019, she was tapered off her flecainide to truly  the success of the ablation.  She reduced her dosing to 150 mg AM + 75 mg PM and did quite well for about 2-months.  She then reduced her dose to 75 mg BID and unfortunately with this started to experience more palpitations > She went back up on the dose and ended up on 150 mg BID.  Despite having palpitations, she has had no documented recurrence of AF since her ablation in 2016.    From a functional standpoint, in past visits, patient says she stays quite active without SOB, CP or palpitations.  She is able to do ADLs and yard work and feels well.  BMP stable from October 2024.  On flecainide 150 mg BID.    EKG 1/14/25: NSR with expected first degree AVB on Flecainide (Rosemary 252 ms), QRS stable 110 ms.  __________________________________________________________________         Assessment and Impression:     Paroxysmal atrial fibrillation  RENU  HLD  Morbid obesity  MDD         Recommendations and Plan:     No changes today from a cardiac standpoint.  Continue Flecainide 150 mg BID.  Ongoing healthy lifestyle and routine follow up with PCP.  Routine EKG in 1-year.  Cardiology follow up in 1-year.  __________________________________________________________________    Thank you for the opportunity to participate in this pleasant patient's care.    We would be  happy to see this patient sooner for any concerns in the meantime.    NAVI Johnson, CNP   Nurse Practitioner  [facility]    Today's clinic visit entailed:  The following tests were independently interpreted by me as noted in my documentation: EKG, labs  Ordering of each unique test  Prescription drug management  The level of medical decision making during this visit was of moderate complexity.  Review of the result(s) of each unique test - cardiac testing, cardiac imaging, labs  Care everywhere reviewed for additional records to facilitate comprehensive patient care.    Orders this Visit:  Orders Placed This Encounter   Procedures     Follow-Up with Cardiology     EKG 12-lead complete w/read - Clinics (future- to be scheduled)     Orders Placed This Encounter   Medications     flecainide (TAMBOCOR) 150 MG tablet     Sig: Take 1 tablet (150 mg) by mouth every 12 hours.     Dispense:  60 tablet     Refill:  11     ALPRAZolam (XANAX) 0.25 MG tablet     Sig: Take 1 tablet (0.25 mg) by mouth nightly as needed for anxiety.     Dispense:  20 tablet     Refill:  0     Medications Discontinued During This Encounter   Medication Reason     HYDROcodone-acetaminophen (NORCO) 5-325 MG tablet      flecainide (TAMBOCOR) 150 MG tablet Reorder (No AVS)     Encounter Diagnoses   Name Primary?     Paroxysmal atrial fibrillation (H) Yes     Anxiety      Major depressive disorder, recurrent episode, moderate (H)      Morbid obesity (H)      CURRENT MEDICATIONS:  Current Outpatient Medications   Medication Sig Dispense Refill     albuterol (VENTOLIN HFA) 108 (90 Base) MCG/ACT inhaler Inhale 2 puffs into the lungs every 4 hours as needed for shortness of breath or wheezing 18 g 1     ALPRAZolam (XANAX) 0.25 MG tablet Take 1 tablet (0.25 mg) by mouth nightly as needed for anxiety. 20 tablet 0     biotin 1000 MCG TABS tablet Take 1,000 mcg by mouth daily.       cetirizine (ZYRTEC) 10 MG tablet Take 10 mg by  "mouth daily       escitalopram (LEXAPRO) 10 MG tablet Take 1 tablet (10 mg) by mouth daily. 90 tablet 3     flecainide (TAMBOCOR) 150 MG tablet Take 1 tablet (150 mg) by mouth every 12 hours. 60 tablet 11     simvastatin (ZOCOR) 10 MG tablet Take 1 tablet (10 mg) by mouth daily. 90 tablet 3     triamcinolone (KENALOG) 0.1 % external cream Apply topically 2 times daily 30 g 1     ALLERGIES     Allergies   Allergen Reactions     Oxycodone Nausea and Vomiting     PAST MEDICAL, SURGICAL, FAMILY, SOCIAL HISTORY:  History was reviewed and updated as needed, see medical record.    Review of Systems:  A 10-point Review Of Systems is otherwise normal except for that which is noted in the HPI and interval summary.    Physical Exam:    Vitals: /84 (BP Location: Right arm, Patient Position: Sitting, Cuff Size: Adult Regular)   Pulse 58   Resp 18   Ht 1.575 m (5' 2\")   Wt 93.9 kg (207 lb)   LMP 05/01/2009   SpO2 97%   BMI 37.86 kg/m    Constitutional: Appears stated age, well nourished, NAD.  Neck: Supple. Carotid pulses full and equal.   Respiratory: Non-labored. Lungs CTAB.  Cardiovascular: RRR, normal S1 and S2. No M/G/R.  No edema.  GI: Soft, non-distended, non-tender.  Skin: Warm and dry.   Musculoskeletal/Extremities: Symmetrical movement.  Neurologic: No gross focal deficits. Alert, awake.  Psychiatric: Affect appropriate. Mentation normal.    Recent Lab Results:  LIPID RESULTS:  Lab Results   Component Value Date    CHOL 161 10/08/2024    CHOL 144 04/09/2021    HDL 40 (L) 10/08/2024    HDL 43 (L) 04/09/2021    LDL 88 10/08/2024    LDL 74 04/09/2021    TRIG 163 (H) 10/08/2024    TRIG 136 04/09/2021    CHOLHDLRATIO 5.8 (H) 08/20/2015     LIVER ENZYME RESULTS:  Lab Results   Component Value Date    AST 15 10/08/2024    AST 11 04/09/2021    ALT 12 10/08/2024    ALT 26 04/09/2021     CBC RESULTS:  Lab Results   Component Value Date    WBC 6.1 10/08/2024    WBC 6.7 05/05/2021    RBC 4.35 10/08/2024    RBC 4.41 " "05/05/2021    HGB 13.7 10/08/2024    HGB 13.6 05/05/2021    HCT 39.8 10/08/2024    HCT 40.6 05/05/2021    MCV 92 10/08/2024    MCV 92 05/05/2021    MCH 31.5 10/08/2024    MCH 30.8 05/05/2021    MCHC 34.4 10/08/2024    MCHC 33.5 05/05/2021    RDW 12.4 10/08/2024    RDW 12.4 05/05/2021     10/08/2024     05/05/2021     BMP RESULTS:  Lab Results   Component Value Date     10/08/2024     04/09/2021    POTASSIUM 4.3 10/08/2024    POTASSIUM 4.2 03/11/2022    POTASSIUM 4.5 04/09/2021    CHLORIDE 105 10/08/2024    CHLORIDE 109 03/11/2022    CHLORIDE 107 04/09/2021    CO2 23 10/08/2024    CO2 29 03/11/2022    CO2 28 04/09/2021    ANIONGAP 12 10/08/2024    ANIONGAP 2 (L) 03/11/2022    ANIONGAP 4 04/09/2021    GLC 87 10/08/2024     (H) 03/11/2022    GLC 89 04/09/2021    BUN 15.8 10/08/2024    BUN 19 03/11/2022    BUN 17 04/09/2021    CR 0.84 10/08/2024    CR 0.82 04/09/2021    GFRESTIMATED 74 10/08/2024    GFRESTIMATED 73 04/09/2021    GFRESTBLACK 84 04/09/2021    CONSTANTIN 9.1 10/08/2024    CONSTANTIN 8.6 04/09/2021      A1C RESULTS:  No results found for: \"A1C\"  INR RESULTS:  Lab Results   Component Value Date    INR 0.99 10/12/2016                     Thank you for allowing me to participate in the care of your patient.      Sincerely,     NAVI Johnson [facility]  cc:   Luis Aguilar MD  [address]  [place] [id number]      "

## 2025-03-11 NOTE — PATIENT INSTRUCTIONS
Thank you for your visit with the [facility] today.    Today's Summary:    EKG in 2026.  Continue Flecainide 150 mg twice daily.  Your EKG from this year looks nice and stable.    Follow-up:  Cardiology follow up at [facility]: 1-year.     Cardiology Scheduling ~[phone]    It was a pleasure seeing you today.     NAVI Johnson, CNP  Certified Nurse Practitioner  [facility]  March 11, 2025  ________________________________________________________

## 2025-07-21 ENCOUNTER — NURSE TRIAGE (OUTPATIENT)
Dept: FAMILY MEDICINE | Facility: OTHER | Age: 75
End: 2025-07-21
Payer: MEDICARE

## 2025-07-21 NOTE — TELEPHONE ENCOUNTER
"  Nurse Triage SBAR    Is this a 2nd Level Triage? NO    Situation: Constipation, chronic \"lifelong per patient.    Background: Patient reports that she is constipated, chronically    Assessment: States she gets sick from enemas therefore she hasn't attempted one, normally doesn't have a bowel movement for 5-6 days at a time but is feeling like there is a \"clump\" of stool that needs to pass. Hasn't tried any stool softeners yet. Encouraged to use miralax/stool softener to help. Denies abdominal pain, nausea or vomiting. She states \"I just need to poop and need something to help me go\" and is requesting a PCP appointment.    Protocol Recommended Disposition:   See in Office Within 3 Days    Recommendation: RN advised appointment. Appointment scheduled. RN reviewed red flag symptoms with patient and when to see emergency care. They agreed and understood. Encouraged increased fluid intake and walking to promote movement.          Does the patient meet one of the following criteria for ADS visit consideration? 16+ years old, with an [facility] PCP     TIP  Providers, please consider if this condition is appropriate for management at one of our Acute and Diagnostic Services sites.     If patient is a good candidate, please use dotphrase <dot>triageresponse and select Refer to ADS to document.          Reason for Disposition   Unable to have a bowel movement (BM) without using a laxative, suppository, or enema    Additional Information   Negative: Abdomen bloating or swelling are main symptoms   Negative: Abdomen pain is main symptom and male   Negative: Abdomen pain is main symptom and female   Negative: Rectal bleeding or blood in stool is main symptom   Negative: Vomiting bile (green color)   Negative: Patient sounds very sick or weak to the triager   Negative: Constant abdominal pain lasting > 2 hours   Negative: Vomiting and abdomen looks much more swollen than usual   Negative: Rectal pain or fullness from fecal " impaction (rectum full of stool) and NOT better after SITZ bath, suppository or enema   Negative: Abdomen is more swollen than usual   Negative: Last bowel movement (BM) > 4 days ago   Negative: Leaking stool   Negative: MILD abdominal pain (e.g., does not interfere with normal activities) that comes and goes (cramps) and fever    Protocols used: Constipation-A-OH

## 2025-07-22 ENCOUNTER — OFFICE VISIT (OUTPATIENT)
Dept: FAMILY MEDICINE | Facility: OTHER | Age: 75
End: 2025-07-22
Payer: MEDICARE

## 2025-07-22 VITALS
BODY MASS INDEX: 34.96 KG/M2 | DIASTOLIC BLOOD PRESSURE: 70 MMHG | SYSTOLIC BLOOD PRESSURE: 110 MMHG | WEIGHT: 190 LBS | OXYGEN SATURATION: 97 % | HEIGHT: 62 IN | HEART RATE: 57 BPM | TEMPERATURE: 96.5 F | RESPIRATION RATE: 18 BRPM

## 2025-07-22 DIAGNOSIS — K59.00 CONSTIPATION, UNSPECIFIED CONSTIPATION TYPE: Primary | ICD-10-CM

## 2025-07-22 DIAGNOSIS — F33.1 MAJOR DEPRESSIVE DISORDER, RECURRENT EPISODE, MODERATE (H): ICD-10-CM

## 2025-07-22 PROBLEM — E66.01 MORBID OBESITY (H): Status: RESOLVED | Noted: 2017-05-23 | Resolved: 2025-07-22

## 2025-07-22 LAB
ALBUMIN SERPL BCG-MCNC: 4.2 G/DL (ref 3.5–5.2)
ALP SERPL-CCNC: 63 U/L (ref 40–150)
ALT SERPL W P-5'-P-CCNC: 14 U/L (ref 0–50)
ANION GAP SERPL CALCULATED.3IONS-SCNC: 10 MMOL/L (ref 7–15)
AST SERPL W P-5'-P-CCNC: 15 U/L (ref 0–45)
BILIRUB SERPL-MCNC: 0.5 MG/DL
BUN SERPL-MCNC: 16.7 MG/DL (ref 8–23)
CALCIUM SERPL-MCNC: 9.4 MG/DL (ref 8.8–10.4)
CHLORIDE SERPL-SCNC: 106 MMOL/L (ref 98–107)
CHOLEST SERPL-MCNC: 140 MG/DL
CREAT SERPL-MCNC: 0.9 MG/DL (ref 0.51–0.95)
EGFRCR SERPLBLD CKD-EPI 2021: 68 ML/MIN/1.73M2
ERYTHROCYTE [DISTWIDTH] IN BLOOD BY AUTOMATED COUNT: 12.2 % (ref 10–15)
FASTING STATUS PATIENT QL REPORTED: YES
FASTING STATUS PATIENT QL REPORTED: YES
GLUCOSE SERPL-MCNC: 90 MG/DL (ref 70–99)
HCO3 SERPL-SCNC: 24 MMOL/L (ref 22–29)
HCT VFR BLD AUTO: 42.2 % (ref 35–47)
HDLC SERPL-MCNC: 41 MG/DL
HGB BLD-MCNC: 14.4 G/DL (ref 11.7–15.7)
LDLC SERPL CALC-MCNC: 75 MG/DL
MCH RBC QN AUTO: 31.4 PG (ref 26.5–33)
MCHC RBC AUTO-ENTMCNC: 34.1 G/DL (ref 31.5–36.5)
MCV RBC AUTO: 92 FL (ref 78–100)
NONHDLC SERPL-MCNC: 99 MG/DL
PLATELET # BLD AUTO: 197 10E3/UL (ref 150–450)
POTASSIUM SERPL-SCNC: 4.2 MMOL/L (ref 3.4–5.3)
PROT SERPL-MCNC: 7.4 G/DL (ref 6.4–8.3)
RBC # BLD AUTO: 4.58 10E6/UL (ref 3.8–5.2)
SODIUM SERPL-SCNC: 140 MMOL/L (ref 135–145)
TRIGL SERPL-MCNC: 120 MG/DL
TSH SERPL DL<=0.005 MIU/L-ACNC: 2.75 UIU/ML (ref 0.3–4.2)
WBC # BLD AUTO: 5.7 10E3/UL (ref 4–11)

## 2025-07-22 PROCEDURE — 3078F DIAST BP <80 MM HG: CPT | Performed by: FAMILY MEDICINE

## 2025-07-22 PROCEDURE — 80061 LIPID PANEL: CPT | Mod: GZ | Performed by: FAMILY MEDICINE

## 2025-07-22 PROCEDURE — 96127 BRIEF EMOTIONAL/BEHAV ASSMT: CPT | Performed by: FAMILY MEDICINE

## 2025-07-22 PROCEDURE — 36415 COLL VENOUS BLD VENIPUNCTURE: CPT | Performed by: FAMILY MEDICINE

## 2025-07-22 PROCEDURE — 84443 ASSAY THYROID STIM HORMONE: CPT | Performed by: FAMILY MEDICINE

## 2025-07-22 PROCEDURE — 85027 COMPLETE CBC AUTOMATED: CPT | Performed by: FAMILY MEDICINE

## 2025-07-22 PROCEDURE — 3074F SYST BP LT 130 MM HG: CPT | Performed by: FAMILY MEDICINE

## 2025-07-22 PROCEDURE — 99214 OFFICE O/P EST MOD 30 MIN: CPT | Performed by: FAMILY MEDICINE

## 2025-07-22 PROCEDURE — 80053 COMPREHEN METABOLIC PANEL: CPT | Performed by: FAMILY MEDICINE

## 2025-07-22 RX ORDER — POLYETHYLENE GLYCOL 3350 17 G/17G
1 POWDER, FOR SOLUTION ORAL DAILY
COMMUNITY
Start: 2025-07-22

## 2025-07-22 ASSESSMENT — PATIENT HEALTH QUESTIONNAIRE - PHQ9
SUM OF ALL RESPONSES TO PHQ QUESTIONS 1-9: 3
10. IF YOU CHECKED OFF ANY PROBLEMS, HOW DIFFICULT HAVE THESE PROBLEMS MADE IT FOR YOU TO DO YOUR WORK, TAKE CARE OF THINGS AT HOME, OR GET ALONG WITH OTHER PEOPLE: NOT DIFFICULT AT ALL
SUM OF ALL RESPONSES TO PHQ QUESTIONS 1-9: 3

## 2025-07-22 NOTE — PROGRESS NOTES
"  Assessment & Plan     Constipation, unspecified constipation type  She feels her constipation has been worse over this last month.  This coincides with her change in her diet to eat healthier.  Will obtain labs.  Recommend that she start taking MiraLAX daily.  Did discuss that this may take a week or so to be effective.  If this is not helpful then may need to consider colonoscopy.  Her colonoscopy was 2 years ago.    - polyethylene glycol (MIRALAX) 17 GM/Dose powder; Take 17 g (1 Capful) by mouth daily.  - Comprehensive metabolic panel (BMP + Alb, Alk Phos, ALT, AST, Total. Bili, TP)  - Lipid panel reflex to direct LDL Fasting  - TSH with free T4 reflex  - CBC with platelets    Major depressive disorder, recurrent episode, moderate (H)  Stable on Lexapro.    BMI  Estimated body mass index is 34.75 kg/m  as calculated from the following:    Height as of this encounter: 1.575 m (5' 2\").    Weight as of this encounter: 86.2 kg (190 lb).   Weight management plan: Discussed healthy diet and exercise guidelines      Barron Motley is a 74 year old, presenting for the following health issues:  Constipation      7/22/2025    12:56 PM   Additional Questions   Roomed by alfreda     History of Present Illness       Reason for visit:  Colon  Symptom onset:  More than a month  Symptom intensity:  Severe  Symptom progression:  Staying the same  Had these symptoms before:  Yes  Has tried/received treatment for these symptoms:  Yes  Previous treatment was successful:  No  What makes it worse:  No  What makes it better:  No   She is taking medications regularly.         has had constipation all her life.  About 5 years states she was told by a doctor that she doesn't have a nerve that tells her when she needs to have a bowel movement.      has felt constipated for the last 5 weeks.  Uses a magnesium laxative which she feels helps her clean out.  She states she will feel like she has a hard piece of bowel " "movement in a pocket near her rectum that the laxative helps her get rid of.  She denies feeling that currently.  She states she can go 4 to 6 days between bowel movements.  No pain or cramping.  She is passing flatus.    Was eating regular diet until beginning of June.  Cut out sugar during the day, which is cutting out cookies, rolls, candies.  Has ice cream every couple weeks.  For breakfast has 2 fried eggs and piece of whole grain toast or oatmeal with blueberries.  For dinner has sandwich and soup, or mashed potatoes, broccoli and chicken breast.  Tries to drink at least 4 full glasses of water a day, sometimes more.  She has been intentionally losing weight this way.      Objective    /70 (BP Location: Right arm, Patient Position: Sitting, Cuff Size: Adult Regular)   Pulse 57   Temp (!) 96.5  F (35.8  C) (Temporal)   Resp 18   Ht 1.575 m (5' 2\")   Wt 86.2 kg (190 lb)   LMP 05/01/2009   SpO2 97%   BMI 34.75 kg/m    Body mass index is 34.75 kg/m .  Physical Exam   Gen: no apparent distress  Abd: The abdomen is soft without tenderness, guarding, mass, rebound or organomegaly. Bowel sounds are normal.   Psych: Alert and oriented times 3; coherent speech, normal   rate and volume, able to articulate logical thoughts, able   to abstract reason, no tangential thoughts, no hallucinations   or delusions  Her affect is neutral          Signed Electronically by: Jessy David MD    "

## 2025-07-23 ENCOUNTER — RESULTS FOLLOW-UP (OUTPATIENT)
Dept: FAMILY MEDICINE | Facility: OTHER | Age: 75
End: 2025-07-23
Payer: MEDICARE

## 2025-07-24 ENCOUNTER — HOSPITAL ENCOUNTER (OUTPATIENT)
Dept: MRI IMAGING | Facility: CLINIC | Age: 75
Discharge: HOME | End: 2025-07-24
Attending: FAMILY MEDICINE
Payer: MEDICARE

## 2025-07-24 DIAGNOSIS — K86.2 CYST OF PANCREAS: ICD-10-CM

## 2025-07-24 PROCEDURE — A9585 GADOBUTROL INJECTION: HCPCS | Performed by: FAMILY MEDICINE

## 2025-07-24 PROCEDURE — 255N000002 HC RX 255 OP 636: Performed by: FAMILY MEDICINE

## 2025-07-24 PROCEDURE — 74183 MRI ABD W/O CNTR FLWD CNTR: CPT

## 2025-07-24 RX ORDER — GADOBUTROL 604.72 MG/ML
10 INJECTION INTRAVENOUS ONCE
Status: COMPLETED | OUTPATIENT
Start: 2025-07-24 | End: 2025-07-24

## 2025-07-24 RX ADMIN — GADOBUTROL 10 ML: 604.72 INJECTION INTRAVENOUS at 14:30

## (undated) DEVICE — SOL WATER IRRIG 1000ML BOTTLE 07139-09

## (undated) DEVICE — CATH FOLEY 16FR 5ML SILVER COAT LATEX 0165SI16

## (undated) DEVICE — DRSG PRIMAPORE 02X3" 7133

## (undated) DEVICE — DRAPE GYN/UROLOGY FLUID POUCH TUR 29455

## (undated) DEVICE — TUBING SUCTION 12"X1/4" N612

## (undated) DEVICE — NDL ECLIPSE 22GA 1.5"

## (undated) DEVICE — LUBRICATING JELLY 4.25OZ

## (undated) DEVICE — PEN MARKING SKIN

## (undated) DEVICE — PREP SKIN SCRUB TRAY 4461A

## (undated) DEVICE — GOWN IMPERVIOUS BREATHABLE 2XL/XLONG

## (undated) DEVICE — BLADE CLIPPER 4406

## (undated) DEVICE — PACK SET-UP STD 9102

## (undated) DEVICE — PREP POVIDONE IODINE SCRUB 7.5% 120ML

## (undated) DEVICE — TUBING CYSTO/BLADDER IRRIG SET 80" 06544-01

## (undated) DEVICE — SOL NACL 0.9% IRRIG 1000ML BOTTLE 07138-09

## (undated) DEVICE — LABEL MEDICATION SYSTEM  3304

## (undated) DEVICE — TUBING SUCTION 6"X3/16" N56A

## (undated) DEVICE — SYR 10ML LL W/O NDL

## (undated) DEVICE — SYR EAR BULB 2OZ

## (undated) DEVICE — BASIN SET MINOR DISP

## (undated) DEVICE — SOL WATER IRRIG 1000ML BOTTLE 2F7114

## (undated) DEVICE — PREP POVIDONE IODINE SOLUTION 10% 120ML

## (undated) DEVICE — KIT ENDO TURNOVER/PROCEDURE CARRY-ON 101822

## (undated) DEVICE — PACK MINOR PROCEDURE CUSTOM

## (undated) DEVICE — GLOVE PROTEXIS W/NEU-THERA 8.0  2D73TE80

## (undated) DEVICE — SOL WATER INJ 2000ML BAG 07118-07

## (undated) DEVICE — GOWN XLG DISP 9545

## (undated) DEVICE — GLOVE PROTEXIS W/NEU-THERA 7.5  2D73TE75

## (undated) DEVICE — SU VICRYL 2-0 UR-6 27" J602H

## (undated) RX ORDER — DEXAMETHASONE SODIUM PHOSPHATE 4 MG/ML
INJECTION, SOLUTION INTRA-ARTICULAR; INTRALESIONAL; INTRAMUSCULAR; INTRAVENOUS; SOFT TISSUE
Status: DISPENSED
Start: 2024-12-19

## (undated) RX ORDER — LIDOCAINE HYDROCHLORIDE 20 MG/ML
INJECTION, SOLUTION EPIDURAL; INFILTRATION; INTRACAUDAL; PERINEURAL
Status: DISPENSED
Start: 2018-08-20

## (undated) RX ORDER — CEFAZOLIN SODIUM 2 G/50ML
SOLUTION INTRAVENOUS
Status: DISPENSED
Start: 2024-12-19

## (undated) RX ORDER — CEFAZOLIN SODIUM 1 G/3ML
INJECTION, POWDER, FOR SOLUTION INTRAMUSCULAR; INTRAVENOUS
Status: DISPENSED
Start: 2018-08-20

## (undated) RX ORDER — LIDOCAINE HYDROCHLORIDE 10 MG/ML
INJECTION, SOLUTION EPIDURAL; INFILTRATION; INTRACAUDAL; PERINEURAL
Status: DISPENSED
Start: 2018-08-20

## (undated) RX ORDER — PROPOFOL 10 MG/ML
INJECTION, EMULSION INTRAVENOUS
Status: DISPENSED
Start: 2018-08-20

## (undated) RX ORDER — ACETAMINOPHEN 325 MG/1
TABLET ORAL
Status: DISPENSED
Start: 2024-12-19

## (undated) RX ORDER — KETOROLAC TROMETHAMINE 30 MG/ML
INJECTION, SOLUTION INTRAMUSCULAR; INTRAVENOUS
Status: DISPENSED
Start: 2024-12-19

## (undated) RX ORDER — VASOPRESSIN 20 U/ML
INJECTION PARENTERAL
Status: DISPENSED
Start: 2018-08-20

## (undated) RX ORDER — PROPOFOL 10 MG/ML
INJECTION, EMULSION INTRAVENOUS
Status: DISPENSED
Start: 2024-12-19

## (undated) RX ORDER — DEXAMETHASONE SODIUM PHOSPHATE 10 MG/ML
INJECTION INTRAMUSCULAR; INTRAVENOUS
Status: DISPENSED
Start: 2018-08-20

## (undated) RX ORDER — FENTANYL CITRATE 50 UG/ML
INJECTION, SOLUTION INTRAMUSCULAR; INTRAVENOUS
Status: DISPENSED
Start: 2024-12-19

## (undated) RX ORDER — ONDANSETRON 2 MG/ML
INJECTION INTRAMUSCULAR; INTRAVENOUS
Status: DISPENSED
Start: 2024-12-19

## (undated) RX ORDER — PHENYLEPHRINE HCL IN 0.9% NACL 1 MG/10 ML
SYRINGE (ML) INTRAVENOUS
Status: DISPENSED
Start: 2018-08-20

## (undated) RX ORDER — FENTANYL CITRATE 50 UG/ML
INJECTION, SOLUTION INTRAMUSCULAR; INTRAVENOUS
Status: DISPENSED
Start: 2018-08-20

## (undated) RX ORDER — ONDANSETRON 2 MG/ML
INJECTION INTRAMUSCULAR; INTRAVENOUS
Status: DISPENSED
Start: 2018-08-20

## (undated) RX ORDER — GLYCOPYRROLATE 0.2 MG/ML
INJECTION, SOLUTION INTRAMUSCULAR; INTRAVENOUS
Status: DISPENSED
Start: 2024-12-19

## (undated) RX ORDER — ATROPINE SULFATE 1 MG/ML
INJECTION, SOLUTION INTRAVENOUS
Status: DISPENSED
Start: 2024-12-19

## (undated) RX ORDER — EPHEDRINE SULFATE 50 MG/ML
INJECTION, SOLUTION INTRAMUSCULAR; INTRAVENOUS; SUBCUTANEOUS
Status: DISPENSED
Start: 2024-12-19

## (undated) RX ORDER — LIDOCAINE HYDROCHLORIDE AND EPINEPHRINE 10; 10 MG/ML; UG/ML
INJECTION, SOLUTION INFILTRATION; PERINEURAL
Status: DISPENSED
Start: 2018-08-20